# Patient Record
Sex: FEMALE | Race: WHITE | Employment: OTHER | URBAN - METROPOLITAN AREA
[De-identification: names, ages, dates, MRNs, and addresses within clinical notes are randomized per-mention and may not be internally consistent; named-entity substitution may affect disease eponyms.]

---

## 2017-06-22 ENCOUNTER — APPOINTMENT (OUTPATIENT)
Dept: CT IMAGING | Age: 72
End: 2017-06-22
Attending: EMERGENCY MEDICINE
Payer: MEDICARE

## 2017-06-22 ENCOUNTER — HOSPITAL ENCOUNTER (EMERGENCY)
Age: 72
Discharge: HOME | End: 2017-06-22
Attending: EMERGENCY MEDICINE
Payer: MEDICARE

## 2017-06-22 VITALS
HEIGHT: 66 IN | HEART RATE: 65 BPM | WEIGHT: 238 LBS | BODY MASS INDEX: 38.25 KG/M2 | TEMPERATURE: 98.1 F | DIASTOLIC BLOOD PRESSURE: 77 MMHG | RESPIRATION RATE: 18 BRPM | SYSTOLIC BLOOD PRESSURE: 139 MMHG | OXYGEN SATURATION: 97 %

## 2017-06-22 DIAGNOSIS — K57.92 DIVERTICULITIS OF INTESTINE WITHOUT PERFORATION OR ABSCESS WITHOUT BLEEDING, UNSPECIFIED PART OF INTESTINAL TRACT: ICD-10-CM

## 2017-06-22 DIAGNOSIS — R19.7 DIARRHEA, UNSPECIFIED TYPE: ICD-10-CM

## 2017-06-22 DIAGNOSIS — R10.13 ABDOMINAL PAIN, EPIGASTRIC: Primary | ICD-10-CM

## 2017-06-22 LAB
ALBUMIN SERPL BCP-MCNC: 3.5 G/DL (ref 3.2–4.6)
ALBUMIN/GLOB SERPL: 0.8 {RATIO} (ref 1.2–3.5)
ALP SERPL-CCNC: 57 U/L (ref 50–136)
ALT SERPL-CCNC: 19 U/L (ref 12–65)
ANION GAP BLD CALC-SCNC: 12 MMOL/L (ref 7–16)
AST SERPL W P-5'-P-CCNC: 20 U/L (ref 15–37)
BASOPHILS # BLD AUTO: 0 K/UL (ref 0–0.2)
BASOPHILS # BLD: 0 % (ref 0–2)
BILIRUB SERPL-MCNC: 0.3 MG/DL (ref 0.2–1.1)
BUN SERPL-MCNC: 11 MG/DL (ref 8–23)
CALCIUM SERPL-MCNC: 9.6 MG/DL (ref 8.3–10.4)
CHLORIDE SERPL-SCNC: 105 MMOL/L (ref 98–107)
CO2 SERPL-SCNC: 25 MMOL/L (ref 21–32)
CREAT SERPL-MCNC: 0.7 MG/DL (ref 0.6–1)
DIFFERENTIAL METHOD BLD: ABNORMAL
EOSINOPHIL # BLD: 0.3 K/UL (ref 0–0.8)
EOSINOPHIL NFR BLD: 2 % (ref 0.5–7.8)
ERYTHROCYTE [DISTWIDTH] IN BLOOD BY AUTOMATED COUNT: 16.5 % (ref 11.9–14.6)
GLOBULIN SER CALC-MCNC: 4.5 G/DL (ref 2.3–3.5)
GLUCOSE SERPL-MCNC: 110 MG/DL (ref 65–100)
HCT VFR BLD AUTO: 40.2 % (ref 35.8–46.3)
HGB BLD-MCNC: 13 G/DL (ref 11.7–15.4)
IMM GRANULOCYTES # BLD: 0 K/UL (ref 0–0.5)
IMM GRANULOCYTES NFR BLD AUTO: 0.3 % (ref 0–5)
LIPASE SERPL-CCNC: 189 U/L (ref 73–393)
LYMPHOCYTES # BLD AUTO: 25 % (ref 13–44)
LYMPHOCYTES # BLD: 2.7 K/UL (ref 0.5–4.6)
MCH RBC QN AUTO: 26.1 PG (ref 26.1–32.9)
MCHC RBC AUTO-ENTMCNC: 32.3 G/DL (ref 31.4–35)
MCV RBC AUTO: 80.7 FL (ref 79.6–97.8)
MONOCYTES # BLD: 0.9 K/UL (ref 0.1–1.3)
MONOCYTES NFR BLD AUTO: 8 % (ref 4–12)
NEUTS SEG # BLD: 7.2 K/UL (ref 1.7–8.2)
NEUTS SEG NFR BLD AUTO: 65 % (ref 43–78)
PLATELET # BLD AUTO: 450 K/UL (ref 150–450)
PMV BLD AUTO: 9.5 FL (ref 10.8–14.1)
POTASSIUM SERPL-SCNC: 3.9 MMOL/L (ref 3.5–5.1)
PROT SERPL-MCNC: 8 G/DL (ref 6.3–8.2)
RBC # BLD AUTO: 4.98 M/UL (ref 4.05–5.25)
SODIUM SERPL-SCNC: 142 MMOL/L (ref 136–145)
TROPONIN I SERPL-MCNC: <0.02 NG/ML (ref 0.02–0.05)
WBC # BLD AUTO: 11.1 K/UL (ref 4.3–11.1)

## 2017-06-22 PROCEDURE — 83690 ASSAY OF LIPASE: CPT | Performed by: EMERGENCY MEDICINE

## 2017-06-22 PROCEDURE — 81003 URINALYSIS AUTO W/O SCOPE: CPT | Performed by: EMERGENCY MEDICINE

## 2017-06-22 PROCEDURE — 74011636320 HC RX REV CODE- 636/320: Performed by: EMERGENCY MEDICINE

## 2017-06-22 PROCEDURE — 74177 CT ABD & PELVIS W/CONTRAST: CPT

## 2017-06-22 PROCEDURE — 80053 COMPREHEN METABOLIC PANEL: CPT | Performed by: EMERGENCY MEDICINE

## 2017-06-22 PROCEDURE — 93005 ELECTROCARDIOGRAM TRACING: CPT | Performed by: EMERGENCY MEDICINE

## 2017-06-22 PROCEDURE — 84484 ASSAY OF TROPONIN QUANT: CPT | Performed by: EMERGENCY MEDICINE

## 2017-06-22 PROCEDURE — 99285 EMERGENCY DEPT VISIT HI MDM: CPT | Performed by: EMERGENCY MEDICINE

## 2017-06-22 PROCEDURE — 74011000258 HC RX REV CODE- 258: Performed by: EMERGENCY MEDICINE

## 2017-06-22 PROCEDURE — 85025 COMPLETE CBC W/AUTO DIFF WBC: CPT | Performed by: EMERGENCY MEDICINE

## 2017-06-22 RX ORDER — CIPROFLOXACIN 500 MG/1
500 TABLET ORAL 2 TIMES DAILY
Qty: 20 TAB | Refills: 0 | Status: SHIPPED | OUTPATIENT
Start: 2017-06-22 | End: 2017-07-02

## 2017-06-22 RX ORDER — SUCRALFATE 1 G/10ML
1 SUSPENSION ORAL 4 TIMES DAILY
Qty: 420 ML | Refills: 0 | Status: ON HOLD | OUTPATIENT
Start: 2017-06-22 | End: 2017-07-15

## 2017-06-22 RX ORDER — METRONIDAZOLE 500 MG/1
500 TABLET ORAL 3 TIMES DAILY
Qty: 30 TAB | Refills: 0 | Status: SHIPPED | OUTPATIENT
Start: 2017-06-22 | End: 2017-07-02

## 2017-06-22 RX ORDER — SODIUM CHLORIDE 0.9 % (FLUSH) 0.9 %
10 SYRINGE (ML) INJECTION
Status: COMPLETED | OUTPATIENT
Start: 2017-06-22 | End: 2017-06-22

## 2017-06-22 RX ADMIN — IOPAMIDOL 100 ML: 755 INJECTION, SOLUTION INTRAVENOUS at 17:36

## 2017-06-22 RX ADMIN — Medication 10 ML: at 17:37

## 2017-06-22 RX ADMIN — SODIUM CHLORIDE 100 ML: 900 INJECTION, SOLUTION INTRAVENOUS at 17:37

## 2017-06-22 NOTE — ED TRIAGE NOTES
Pt states she saw her md this morning and was told she may have an infection in her colon. Pt states pain in her stomach for the past two months. Pt states nausea, vomiting and diarrhea.

## 2017-06-22 NOTE — ED NOTES
I have reviewed discharge instructions with the patient. The patient verbalized understanding. 3 Prescriptions given to patient. Patient ambulatory using walker upon discharge.

## 2017-06-22 NOTE — ED PROVIDER NOTES
HPI Comments: 66-year-old female presents with burning epigastric pain and foul smelling formed diarrhea immediately after eating for the past 6 months. She reports at least a 70 pound weight loss in the past 8 months. She has been seen by her primary care physician today and was told that she might have C. Difficile. She was told to come to the emergency department as testing could be done \"much faster. \" she has nausea after eating, but no vomiting. No blood in stools. No fever or travel. No contacts with similar symptoms. No recent hospital admission or antibiotics. Patient is a 67 y.o. female presenting with abdominal pain. The history is provided by the patient. Abdominal Pain    Associated symptoms include diarrhea and nausea. Pertinent negatives include no fever, no vomiting, no dysuria, no headaches, no chest pain and no back pain. Past Medical History:   Diagnosis Date    Diabetes ([address]     Hypertension     Pulmonary nodule     Completed surveillance in 2011       Past Surgical History:   Procedure Laterality Date    HX CARPAL TUNNEL RELEASE  1988    HX KNEE ARTHROSCOPY  2008    Left knee partial replacement    HX SHOULDER ARTHROSCOPY  1989    Rotator cuff         History reviewed. No pertinent family history. Social History     Social History    Marital status:      Spouse name: N/A    Number of children: N/A    Years of education: N/A     Occupational History    Not on file. Social History Main Topics    Smoking status: Never Smoker    Smokeless tobacco: Never Used    Alcohol use No    Drug use: No    Sexual activity: Not on file     Other Topics Concern    Not on file     Social History Narrative         ALLERGIES: Review of patient's allergies indicates no known allergies. Review of Systems   Constitutional: Positive for unexpected weight change. Negative for chills and fever. HENT: Negative for hearing loss. Eyes: Negative for visual disturbance. Respiratory: Negative for cough and shortness of breath. Cardiovascular: Negative for chest pain and palpitations. Gastrointestinal: Positive for abdominal pain, diarrhea and nausea. Negative for blood in stool and vomiting. Genitourinary: Negative for dysuria. Musculoskeletal: Negative for back pain. Skin: Negative for rash. Neurological: Negative for weakness and headaches. Psychiatric/Behavioral: Negative for confusion. Vitals:    06/22/17 1549   BP: 158/61   Pulse: 60   Resp: 18   Temp: 97.2 °F (36.2 °C)   SpO2: 93%   Weight: 108 kg (238 lb)   Height: 5' 6\" (1.676 m)            Physical Exam   Constitutional: She appears well-developed and well-nourished. HENT:   Head: Normocephalic and atraumatic. Right Ear: External ear normal.   Left Ear: External ear normal.   Nose: Nose normal.   Mouth/Throat: Oropharynx is clear and moist.   Eyes: Conjunctivae are normal. Pupils are equal, round, and reactive to light. Neck: Normal range of motion. Neck supple. Cardiovascular: Regular rhythm, normal heart sounds and intact distal pulses. Pulmonary/Chest: Effort normal and breath sounds normal. No respiratory distress. She has no wheezes. Abdominal: Soft. Bowel sounds are normal. She exhibits no distension. There is tenderness in the epigastric area. There is no rigidity, no rebound and no guarding. Musculoskeletal: Normal range of motion. She exhibits no edema. Neurological: She is alert. Skin: Skin is warm and dry. Psychiatric: Judgment normal.   Nursing note and vitals reviewed. MDM  Number of Diagnoses or Management Options  Diagnosis management comments: Parts of this document were created using dragon voice recognition software. The chart has been reviewed but errors may still be present.          Amount and/or Complexity of Data Reviewed  Clinical lab tests: ordered and reviewed (Results for orders placed or performed during the hospital encounter of 06/22/17  -TROPONIN I       Result                                            Value                         Ref Range                       Troponin-I, Qt.                                   <0.02 (L)                     0.02 - 0.05 NG/ML          -CBC WITH AUTOMATED DIFF       Result                                            Value                         Ref Range                       WBC                                               11.1                          4.3 - 11.1 K/uL                 RBC                                               4.98                          4.05 - 5.25 M/uL                HGB                                               13.0                          11.7 - 15.4 g/dL                HCT                                               40.2                          35.8 - 46.3 %                   MCV                                               80.7                          79.6 - 97.8 FL                  MCH                                               26.1                          26.1 - 32.9 PG                  MCHC                                              32.3                          31.4 - 35.0 g/dL                RDW                                               16.5 (H)                      11.9 - 14.6 %                   PLATELET                                          450                           150 - 450 K/uL                  MPV                                               9.5 (L)                       10.8 - 14.1 FL                  DF                                                AUTOMATED                                                     NEUTROPHILS                                       65                            43 - 78 %                       LYMPHOCYTES                                       25                            13 - 44 %                       MONOCYTES                                         8                             4.0 - 12.0 % EOSINOPHILS                                       2                             0.5 - 7.8 %                     BASOPHILS                                         0                             0.0 - 2.0 %                     IMMATURE GRANULOCYTES                             0.3                           0.0 - 5.0 %                     ABS. NEUTROPHILS                                  7.2                           1.7 - 8.2 K/UL                  ABS. LYMPHOCYTES                                  2.7                           0.5 - 4.6 K/UL                  ABS. MONOCYTES                                    0.9                           0.1 - 1.3 K/UL                  ABS. EOSINOPHILS                                  0.3                           0.0 - 0.8 K/UL                  ABS. BASOPHILS                                    0.0                           0.0 - 0.2 K/UL                  ABS. IMM.  GRANS.                                  0.0                           0.0 - 0.5 K/UL             -METABOLIC PANEL, COMPREHENSIVE       Result                                            Value                         Ref Range                       Sodium                                            142                           136 - 145 mmol/L                Potassium                                         3.9                           3.5 - 5.1 mmol/L                Chloride                                          105                           98 - 107 mmol/L                 CO2                                               25                            21 - 32 mmol/L                  Anion gap                                         12                            7 - 16 mmol/L                   Glucose                                           110 (H)                       65 - 100 mg/dL                  BUN                                               11                            8 - 23 MG/DL Creatinine                                        0.70                          0.6 - 1.0 MG/DL                 GFR est AA                                        >60                           >60 ml/min/1.73m2               GFR est non-AA                                    >60                           >60 ml/min/1.73m2               Calcium                                           9.6                           8.3 - 10.4 MG/DL                Bilirubin, total                                  0.3                           0.2 - 1.1 MG/DL                 ALT (SGPT)                                        19                            12 - 65 U/L                     AST (SGOT)                                        20                            15 - 37 U/L                     Alk. phosphatase                                  57                            50 - 136 U/L                    Protein, total                                    8.0                           6.3 - 8.2 g/dL                  Albumin                                           3.5                           3.2 - 4.6 g/dL                  Globulin                                          4.5 (H)                       2.3 - 3.5 g/dL                  A-G Ratio                                         0.8 (L)                       1.2 - 3.5                  -LIPASE       Result                                            Value                         Ref Range                       Lipase                                            189                           73 - 393 U/L               -EKG, 12 LEAD, INITIAL       Result                                            Value                         Ref Range                       Ventricular Rate                                  62                            BPM                             Atrial Rate                                       62                            BPM                             P-R Interval 166                           ms                              QRS Duration                                      132                           ms                              Q-T Interval                                      454                           ms                              QTC Calculation (Bezet)                           460                           ms                              Calculated P Axis                                 13                            degrees                         Calculated R Axis                                 31                            degrees                         Calculated T Axis                                 11                            degrees                         Diagnosis                                                                                                   Normal sinus rhythm   Right bundle branch block   Abnormal ECG   When compared with ECG of 11-FEB-2008 16:46,   No significant change was found     )  Tests in the radiology section of CPT®: ordered and reviewed (Ct Abd Pelv W Cont    Result Date: 6/22/2017  CT of the Abdomen and Pelvis with contrast INDICATION:  Severe mid to upper abdominal pain COMPARISON: None TECHNIQUE: Multiple axial images were obtained through the abdomen and pelvis after intravenous injection of 100 mL Isovue 370. No oral contrast used. Coronal reformatted images were provided. Contrast necessary to increase sensitivity for neoplasia and infection. Radiation dose reduction techniques were used for this study:  Our CT scanners use one or all of the following: Automated exposure control, adjustment of the mA and/or kVp according to patient's size, iterative reconstruction. FINDINGS: Abdomen CT:  Atherosclerotic aortic heart valve. Mild cardiomegaly.  The liver, spleen, gallbladder, adrenal glands, kidneys, and pancreas are normal. Abdominal aorta is atherosclerotic but normal in caliber. The appendix is normal. CT PELVIS: Sigmoid colonic diverticulosis. There are mild proximal sigmoid inflammatory changes consistent with acute diverticulitis. Tiny amount of gas in the urinary bladder. Couple calcifications are associated with the uterus probably related to fibroids. No acute or aggressive bone abnormality. IMPRESSION: Mild proximal sigmoid acute diverticulitis. 2. Tiny focus of urinary bladder luminal gas. Correlate for recent instrumentation. Fistula or airforming infection can also cause this.     )    Risk of Complications, Morbidity, and/or Mortality  General comments: 6:45 PM  Patient has not wanted anything for pain. She has no fever or leukocytosis. Diarrhea and epigastric pain has been intermittent for the past 6 months with weight loss. Inflammation around sigmoid colon is very minimal.  CT reviewed with Dr. Tonia Bolden, surgeon. He was not concerned about perforation, but possibly more likely chronic fistula. Patient has no left lower quadrant or suprapubic tenderness. Will discuss with GI to assure close follow-up for endoscopy for further eval epigastric pain and weight loss. The patient expressed understanding. Given strict return precautions. She is already taking PPI. I discussed the results of all labs, procedures, radiographs, and treatments with the patient and available family. Treatment plan is agreed upon and the patient is ready for discharge. Questions about treatment in the ED and differential diagnosis of presenting condition were answered. Patient was given verbal discharge instructions including, but not limited to, importance of returning to the emergency department for any concern of worsening or continued symptoms. Instructions were given to follow up with a primary care provider or specialist within 1-2 days.   Adverse effects of medications, if prescribed, were discussed and patient was advised to refrain from significant physical activity until followed up by primary care physician and to not drive or operate heavy machinery after taking any sedating substances.           ED Course       Procedures

## 2017-06-22 NOTE — ED NOTES
Pt to er c/o having n/v/d for 3-6 months. .. sts her family doctor sent her to the er to get some \"tests\" done

## 2017-06-22 NOTE — DISCHARGE INSTRUCTIONS
Diverticulitis: Care Instructions  Your Care Instructions    Diverticulitis occurs when pouches form in the wall of the colon and become inflamed or infected. It can be very painful. Doctors aren't sure what causes diverticulitis. There is no proof that foods such as nuts, seeds, or berries cause it or make it worse. A low-fiber diet may cause the colon to work harder to push stool forward. Pouches may form because of this extra work. It may be hard to think about healthy eating while you're in pain. But as you recover, you might think about how you can use healthy eating for overall better health. Healthy eating may help you avoid future attacks. Follow-up care is a key part of your treatment and safety. Be sure to make and go to all appointments, and call your doctor if you are having problems. It's also a good idea to know your test results and keep a list of the medicines you take. How can you care for yourself at home? · Drink plenty of fluids, enough so that your urine is light yellow or clear like water. If you have kidney, heart, or liver disease and have to limit fluids, talk with your doctor before you increase the amount of fluids you drink. · Stick to liquids or a bland diet (plain rice, bananas, dry toast or crackers, applesauce) until you are feeling better. Then you can return to regular foods and gradually increase the amount of fiber in your diet. · Use a heating pad set on low on your belly to relieve mild cramps and pain. · Get extra rest until you are feeling better. · Be safe with medicines. Read and follow all instructions on the label. ¨ If the doctor gave you a prescription medicine for pain, take it as prescribed. ¨ If you are not taking a prescription pain medicine, ask your doctor if you can take an over-the-counter medicine. · If your doctor prescribed antibiotics, take them as directed. Do not stop taking them just because you feel better.  You need to take the full course of antibiotics. To prevent future attacks of diverticulitis  · Avoid constipation:  ¨ Include fruits, vegetables, beans, and whole grains in your diet each day. These foods are high in fiber. ¨ Drink plenty of fluids, enough so that your urine is light yellow or clear like water. If you have kidney, heart, or liver disease and have to limit fluids, talk with your doctor before you increase the amount of fluids you drink. ¨ Get some exercise every day. Build up slowly to 30 to 60 minutes a day on 5 or more days of the week. ¨ Take a fiber supplement, such as Citrucel or Metamucil, every day if needed. Read and follow all instructions on the label. ¨ Schedule time each day for a bowel movement. Having a daily routine may help. Take your time and do not strain when having a bowel movement. When should you call for help? Call 911 anytime you think you may need emergency care. For example, call if:  · You passed out (lost consciousness). · You vomit blood or what looks like coffee grounds. · You pass maroon or very bloody stools. Call your doctor now or seek immediate medical care if:  · You have severe pain or swelling in your belly. · You have a new or higher fever. · You cannot keep down fluids or medicines. · You have new pain that gets worse when you move or cough. Watch closely for changes in your health, and be sure to contact your doctor if:  · The symptoms you had when you first started feeling sick come back. · You do not get better as expected. Where can you learn more? Go to http://philip-gloria.info/. Enter H901 in the search box to learn more about \"Diverticulitis: Care Instructions. \"  Current as of: August 9, 2016  Content Version: 11.3  © 6392-4070 [employer]. Care instructions adapted under license by [employer] (which disclaims liability or warranty for this information).  If you have questions about a medical condition or this instruction, always ask your healthcare professional. [place] [address] any warranty or liability for your use of this information.

## 2017-06-23 LAB
ATRIAL RATE: 62 BPM
CALCULATED P AXIS, ECG09: 13 DEGREES
CALCULATED R AXIS, ECG10: 31 DEGREES
CALCULATED T AXIS, ECG11: 11 DEGREES
DIAGNOSIS, 93000: NORMAL
P-R INTERVAL, ECG05: 166 MS
Q-T INTERVAL, ECG07: 454 MS
QRS DURATION, ECG06: 132 MS
QTC CALCULATION (BEZET), ECG08: 460 MS
VENTRICULAR RATE, ECG03: 62 BPM

## 2017-07-13 ENCOUNTER — HOSPITAL ENCOUNTER (OUTPATIENT)
Age: 72
Setting detail: OBSERVATION
Discharge: HOME | End: 2017-07-15
Attending: EMERGENCY MEDICINE | Admitting: HOSPITALIST
Payer: MEDICARE

## 2017-07-13 ENCOUNTER — APPOINTMENT (OUTPATIENT)
Dept: CT IMAGING | Age: 72
End: 2017-07-13
Attending: EMERGENCY MEDICINE
Payer: MEDICARE

## 2017-07-13 DIAGNOSIS — R10.13 ABDOMINAL PAIN, EPIGASTRIC: Primary | ICD-10-CM

## 2017-07-13 PROBLEM — R10.9 ABDOMINAL PAIN: Status: ACTIVE | Noted: 2017-07-13

## 2017-07-13 PROBLEM — D64.9 ANEMIA: Status: ACTIVE | Noted: 2017-07-13

## 2017-07-13 LAB
ALBUMIN SERPL BCP-MCNC: 2.9 G/DL (ref 3.2–4.6)
ALBUMIN/GLOB SERPL: 0.7 {RATIO} (ref 1.2–3.5)
ALP SERPL-CCNC: 46 U/L (ref 50–136)
ALT SERPL-CCNC: 12 U/L (ref 12–65)
ANION GAP BLD CALC-SCNC: 11 MMOL/L (ref 7–16)
AST SERPL W P-5'-P-CCNC: 15 U/L (ref 15–37)
ATRIAL RATE: 61 BPM
BASOPHILS # BLD AUTO: 0 K/UL (ref 0–0.2)
BASOPHILS # BLD: 0 % (ref 0–2)
BILIRUB SERPL-MCNC: 0.4 MG/DL (ref 0.2–1.1)
BUN SERPL-MCNC: 8 MG/DL (ref 8–23)
CALCIUM SERPL-MCNC: 9.4 MG/DL (ref 8.3–10.4)
CALCULATED P AXIS, ECG09: 25 DEGREES
CALCULATED R AXIS, ECG10: 33 DEGREES
CALCULATED T AXIS, ECG11: -23 DEGREES
CHLORIDE SERPL-SCNC: 107 MMOL/L (ref 98–107)
CO2 SERPL-SCNC: 26 MMOL/L (ref 21–32)
CREAT SERPL-MCNC: 0.41 MG/DL (ref 0.6–1)
DIAGNOSIS, 93000: NORMAL
DIFFERENTIAL METHOD BLD: ABNORMAL
EOSINOPHIL # BLD: 0.2 K/UL (ref 0–0.8)
EOSINOPHIL NFR BLD: 2 % (ref 0.5–7.8)
ERYTHROCYTE [DISTWIDTH] IN BLOOD BY AUTOMATED COUNT: 16.6 % (ref 11.9–14.6)
GLOBULIN SER CALC-MCNC: 4 G/DL (ref 2.3–3.5)
GLUCOSE SERPL-MCNC: 102 MG/DL (ref 65–100)
HCT VFR BLD AUTO: 35.1 % (ref 35.8–46.3)
HGB BLD-MCNC: 11.4 G/DL (ref 11.7–15.4)
IMM GRANULOCYTES # BLD: 0.1 K/UL (ref 0–0.5)
IMM GRANULOCYTES NFR BLD AUTO: 0.7 % (ref 0–5)
LIPASE SERPL-CCNC: 113 U/L (ref 73–393)
LYMPHOCYTES # BLD AUTO: 11 % (ref 13–44)
LYMPHOCYTES # BLD: 1.3 K/UL (ref 0.5–4.6)
MCH RBC QN AUTO: 26 PG (ref 26.1–32.9)
MCHC RBC AUTO-ENTMCNC: 32.5 G/DL (ref 31.4–35)
MCV RBC AUTO: 80.1 FL (ref 79.6–97.8)
MONOCYTES # BLD: 0.6 K/UL (ref 0.1–1.3)
MONOCYTES NFR BLD AUTO: 5 % (ref 4–12)
NEUTS SEG # BLD: 9.8 K/UL (ref 1.7–8.2)
NEUTS SEG NFR BLD AUTO: 81 % (ref 43–78)
P-R INTERVAL, ECG05: 166 MS
PLATELET # BLD AUTO: 374 K/UL (ref 150–450)
PMV BLD AUTO: 9.1 FL (ref 10.8–14.1)
POTASSIUM SERPL-SCNC: 3.8 MMOL/L (ref 3.5–5.1)
PROT SERPL-MCNC: 6.9 G/DL (ref 6.3–8.2)
Q-T INTERVAL, ECG07: 478 MS
QRS DURATION, ECG06: 136 MS
QTC CALCULATION (BEZET), ECG08: 481 MS
RBC # BLD AUTO: 4.38 M/UL (ref 4.05–5.25)
SODIUM SERPL-SCNC: 144 MMOL/L (ref 136–145)
TROPONIN I SERPL-MCNC: <0.02 NG/ML (ref 0.02–0.05)
VENTRICULAR RATE, ECG03: 61 BPM
WBC # BLD AUTO: 12 K/UL (ref 4.3–11.1)

## 2017-07-13 PROCEDURE — 84484 ASSAY OF TROPONIN QUANT: CPT | Performed by: EMERGENCY MEDICINE

## 2017-07-13 PROCEDURE — 96374 THER/PROPH/DIAG INJ IV PUSH: CPT | Performed by: EMERGENCY MEDICINE

## 2017-07-13 PROCEDURE — 74177 CT ABD & PELVIS W/CONTRAST: CPT

## 2017-07-13 PROCEDURE — 96375 TX/PRO/DX INJ NEW DRUG ADDON: CPT | Performed by: EMERGENCY MEDICINE

## 2017-07-13 PROCEDURE — 99285 EMERGENCY DEPT VISIT HI MDM: CPT | Performed by: EMERGENCY MEDICINE

## 2017-07-13 PROCEDURE — 96361 HYDRATE IV INFUSION ADD-ON: CPT | Performed by: EMERGENCY MEDICINE

## 2017-07-13 PROCEDURE — 74011250636 HC RX REV CODE- 250/636: Performed by: HOSPITALIST

## 2017-07-13 PROCEDURE — 93005 ELECTROCARDIOGRAM TRACING: CPT | Performed by: EMERGENCY MEDICINE

## 2017-07-13 PROCEDURE — 74011000250 HC RX REV CODE- 250: Performed by: HOSPITALIST

## 2017-07-13 PROCEDURE — 83690 ASSAY OF LIPASE: CPT | Performed by: EMERGENCY MEDICINE

## 2017-07-13 PROCEDURE — 80053 COMPREHEN METABOLIC PANEL: CPT | Performed by: EMERGENCY MEDICINE

## 2017-07-13 PROCEDURE — C9113 INJ PANTOPRAZOLE SODIUM, VIA: HCPCS | Performed by: HOSPITALIST

## 2017-07-13 PROCEDURE — 81003 URINALYSIS AUTO W/O SCOPE: CPT | Performed by: EMERGENCY MEDICINE

## 2017-07-13 PROCEDURE — 96376 TX/PRO/DX INJ SAME DRUG ADON: CPT | Performed by: EMERGENCY MEDICINE

## 2017-07-13 PROCEDURE — 99218 HC RM OBSERVATION: CPT

## 2017-07-13 PROCEDURE — 74011000258 HC RX REV CODE- 258: Performed by: EMERGENCY MEDICINE

## 2017-07-13 PROCEDURE — 85025 COMPLETE CBC W/AUTO DIFF WBC: CPT | Performed by: EMERGENCY MEDICINE

## 2017-07-13 PROCEDURE — 74011636320 HC RX REV CODE- 636/320: Performed by: EMERGENCY MEDICINE

## 2017-07-13 PROCEDURE — 74011250636 HC RX REV CODE- 250/636: Performed by: EMERGENCY MEDICINE

## 2017-07-13 RX ORDER — SODIUM CHLORIDE 9 MG/ML
500 INJECTION, SOLUTION INTRAVENOUS ONCE
Status: COMPLETED | OUTPATIENT
Start: 2017-07-13 | End: 2017-07-14

## 2017-07-13 RX ORDER — ONDANSETRON 2 MG/ML
4 INJECTION INTRAMUSCULAR; INTRAVENOUS
Status: COMPLETED | OUTPATIENT
Start: 2017-07-13 | End: 2017-07-13

## 2017-07-13 RX ORDER — MORPHINE SULFATE 4 MG/ML
4 INJECTION, SOLUTION INTRAMUSCULAR; INTRAVENOUS
Status: COMPLETED | OUTPATIENT
Start: 2017-07-13 | End: 2017-07-13

## 2017-07-13 RX ORDER — HYDRALAZINE HYDROCHLORIDE 20 MG/ML
10 INJECTION INTRAMUSCULAR; INTRAVENOUS
Status: DISCONTINUED | OUTPATIENT
Start: 2017-07-13 | End: 2017-07-15 | Stop reason: HOSPADM

## 2017-07-13 RX ORDER — SODIUM CHLORIDE 0.9 % (FLUSH) 0.9 %
10 SYRINGE (ML) INJECTION
Status: COMPLETED | OUTPATIENT
Start: 2017-07-13 | End: 2017-07-13

## 2017-07-13 RX ADMIN — SODIUM CHLORIDE 40 MG: 9 INJECTION INTRAMUSCULAR; INTRAVENOUS; SUBCUTANEOUS at 00:05

## 2017-07-13 RX ADMIN — MORPHINE SULFATE 4 MG: 4 INJECTION, SOLUTION INTRAMUSCULAR; INTRAVENOUS at 22:47

## 2017-07-13 RX ADMIN — SODIUM CHLORIDE 100 ML: 900 INJECTION, SOLUTION INTRAVENOUS at 21:31

## 2017-07-13 RX ADMIN — ONDANSETRON 4 MG: 2 INJECTION INTRAMUSCULAR; INTRAVENOUS at 20:03

## 2017-07-13 RX ADMIN — SODIUM CHLORIDE 500 ML: 900 INJECTION, SOLUTION INTRAVENOUS at 20:03

## 2017-07-13 RX ADMIN — Medication 10 ML: at 21:31

## 2017-07-13 RX ADMIN — MORPHINE SULFATE 4 MG: 4 INJECTION, SOLUTION INTRAMUSCULAR; INTRAVENOUS at 20:03

## 2017-07-13 RX ADMIN — DIATRIZOATE MEGLUMINE AND DIATRIZOATE SODIUM 15 ML: 600; 100 SOLUTION ORAL; RECTAL at 20:03

## 2017-07-13 RX ADMIN — IOPAMIDOL 100 ML: 755 INJECTION, SOLUTION INTRAVENOUS at 21:31

## 2017-07-13 NOTE — ED PROVIDER NOTES
Patient is a 67 y.o. female presenting with abdominal pain. The history is provided by the patient and a relative. Abdominal Pain    This is a recurrent problem. The current episode started more than 1 week ago. The problem occurs daily. The problem has been gradually worsening. The pain is located in the epigastric region. The quality of the pain is aching, cramping and sharp. The pain is at a severity of 7/10. The pain is moderate. Associated symptoms include nausea. Pertinent negatives include no fever, no vomiting, no dysuria, no frequency, no headaches, no arthralgias, no myalgias and no chest pain. Nothing worsens the pain. The pain is relieved by nothing. Past workup includes CT scan. The patient's surgical history non-contributory. Past Medical History:   Diagnosis Date    Diabetes ([facility] [address]     Hypertension     Pulmonary nodule     Completed surveillance in 2011       Past Surgical History:   Procedure Laterality Date    HX CARPAL TUNNEL RELEASE  1988    HX KNEE ARTHROSCOPY  2008    Left knee partial replacement    HX SHOULDER ARTHROSCOPY  1989    Rotator cuff         History reviewed. No pertinent family history. Social History     Social History    Marital status:      Spouse name: N/A    Number of children: N/A    Years of education: N/A     Occupational History    Not on file. Social History Main Topics    Smoking status: Never Smoker    Smokeless tobacco: Never Used    Alcohol use No    Drug use: No    Sexual activity: Not on file     Other Topics Concern    Not on file     Social History Narrative         ALLERGIES: Review of patient's allergies indicates no known allergies. Review of Systems   Constitutional: Negative. Negative for chills and fever. HENT: Negative. Negative for congestion, ear pain, postnasal drip and rhinorrhea. Eyes: Negative for pain and visual disturbance. Respiratory: Negative for cough and wheezing.     Cardiovascular: Negative for chest pain and leg swelling. Gastrointestinal: Positive for abdominal pain and nausea. Negative for abdominal distention and vomiting. Endocrine: Negative. Negative for polydipsia, polyphagia and polyuria. Genitourinary: Negative. Negative for difficulty urinating, dysuria, flank pain and frequency. Musculoskeletal: Negative. Negative for arthralgias and myalgias. Skin: Negative. Neurological: Negative. Negative for dizziness and headaches. Hematological: Negative. Vitals:    07/13/17 1730   BP: 124/59   Pulse: 66   Resp: 18   Temp: 97.8 °F (36.6 °C)   Weight: 108 kg (238 lb)   Height: 5' 6\" (1.676 m)            Physical Exam   Abdominal: She exhibits no distension. There is tenderness. There is no rebound. MDM  Number of Diagnoses or Management Options  Abdominal pain, epigastric: new and requires workup  Diagnosis management comments: Patient states that she was recently in the hospital for similar and diagnosed with sigmoid diverticulitis and treated with metabolic therapy and had some transient improvement only for her symptoms slowly return again. She states she contacted the gastroenterology office to see if she had earlier follow-up since she didn't feel she can make to the appointment but they did not return her call today. Patient ended up in the ER due to increasing discomfort primarily in the epigastric region but has no specific mitigating factors  and is dissimilar to her previous pain from last admission. Reviewed case with Dr. Benjamin Horvath agreed that patient may ultimately require EGD but needs to review her chart. Suggested admission to the hospitalist service if patient is unable to maintain oral intake, has diabetes and is having uncontrolled pain. Imaging failed to reveal etiology of symptoms but seems to be well localized to the upper abdomen and by history is worse with food and seems to be consistent with something similar to PUD versus gastritis. However, since patient is diabetic and unable to maintain a steady oral intake of fluids or food felt that IV hydration may be needed at least overnight until GI can evaluate this patient since it is about to be the weekend and unable to be seen until Monday.        Amount and/or Complexity of Data Reviewed  Clinical lab tests: ordered and reviewed (Results for orders placed or performed during the hospital encounter of 07/13/17  -CBC WITH AUTOMATED DIFF       Result                                            Value                         Ref Range                       WBC                                               12.0 (H)                      4.3 - 11.1 K/uL                 RBC                                               4.38                          4.05 - 5.25 M/uL                HGB                                               11.4 (L)                      11.7 - 15.4 g/dL                HCT                                               35.1 (L)                      35.8 - 46.3 %                   MCV                                               80.1                          79.6 - 97.8 FL                  MCH                                               26.0 (L)                      26.1 - 32.9 PG                  MCHC                                              32.5                          31.4 - 35.0 g/dL                RDW                                               16.6 (H)                      11.9 - 14.6 %                   PLATELET                                          374                           150 - 450 K/uL                  MPV                                               9.1 (L)                       10.8 - 14.1 FL                  DF                                                AUTOMATED                                                     NEUTROPHILS                                       81 (H)                        43 - 78 %                       LYMPHOCYTES 11 (L)                        13 - 44 %                       MONOCYTES                                         5                             4.0 - 12.0 %                    EOSINOPHILS                                       2                             0.5 - 7.8 %                     BASOPHILS                                         0                             0.0 - 2.0 %                     IMMATURE GRANULOCYTES                             0.7                           0.0 - 5.0 %                     ABS. NEUTROPHILS                                  9.8 (H)                       1.7 - 8.2 K/UL                  ABS. LYMPHOCYTES                                  1.3                           0.5 - 4.6 K/UL                  ABS. MONOCYTES                                    0.6                           0.1 - 1.3 K/UL                  ABS. EOSINOPHILS                                  0.2                           0.0 - 0.8 K/UL                  ABS. BASOPHILS                                    0.0                           0.0 - 0.2 K/UL                  ABS. IMM.  GRANS.                                  0.1                           0.0 - 0.5 K/UL             -METABOLIC PANEL, COMPREHENSIVE       Result                                            Value                         Ref Range                       Sodium                                            144                           136 - 145 mmol/L                Potassium                                         3.8                           3.5 - 5.1 mmol/L                Chloride                                          107                           98 - 107 mmol/L                 CO2                                               26                            21 - 32 mmol/L                  Anion gap                                         11                            7 - 16 mmol/L                   Glucose 102 (H)                       65 - 100 mg/dL                  BUN                                               8                             8 - 23 MG/DL                    Creatinine                                        0.41 (L)                      0.6 - 1.0 MG/DL                 GFR est AA                                        >60                           >60 ml/min/1.73m2               GFR est non-AA                                    >60                           >60 ml/min/1.73m2               Calcium                                           9.4                           8.3 - 10.4 MG/DL                Bilirubin, total                                  0.4                           0.2 - 1.1 MG/DL                 ALT (SGPT)                                        12                            12 - 65 U/L                     AST (SGOT)                                        15                            15 - 37 U/L                     Alk. phosphatase                                  46 (L)                        50 - 136 U/L                    Protein, total                                    6.9                           6.3 - 8.2 g/dL                  Albumin                                           2.9 (L)                       3.2 - 4.6 g/dL                  Globulin                                          4.0 (H)                       2.3 - 3.5 g/dL                  A-G Ratio                                         0.7 (L)                       1.2 - 3.5                  -LIPASE       Result                                            Value                         Ref Range                       Lipase                                            113                           73 - 393 U/L               -TROPONIN I       Result                                            Value                         Ref Range                       Troponin-I, Qt.                                   <0.02 (L) 0.02 - 0.05 NG/ML          )  Tests in the radiology section of CPT®: ordered and reviewed      ED Course       Procedures

## 2017-07-13 NOTE — IP AVS SNAPSHOT
[address] 
[phone] Patient: Enriqueta Rasmussen MRN: [id number] You are allergic to the following No active allergies Recent Documentation Height Weight BMI OB Status Smoking Status 1.676 m 108 kg 38.41 kg/m2 Postmenopausal Never Smoker Emergency Contacts Name Discharge Info Relation Home Work Mobile Leonid Snow DISCHARGE CAREGIVER [3] Spouse [3] [phone] About your hospitalization You were admitted on:  July 13, 2017 You last received care in the:  [facility] 6 MED SURG You were discharged on:  July 15, 2017 Unit phone number:  [phone] Why you were hospitalized Your primary diagnosis was:  Abdominal Pain Your diagnoses also included:  Obstructive Sleep Apnea, Obesity, Diabetes Mellitus (Hcc), Anemia Providers Seen During Your Hospitalizations Provider Role Specialty Primary office phone Isabel Valadez DO Attending Provider Emergency Medicine [phone] Oriana Aguila MD Attending Provider [facility] [place] [phone] Trae Judy Suarez MD Attending Provider Internal Medicine [phone] Your Primary Care Physician (PCP) Primary Care Physician Office Phone Office Fax 3 [address] [phone] Follow-up Information Follow up With Details Comments Contact Info Navdeep Ta MD   [address] 
[phone] Current Discharge Medication List  
  
START taking these medications Dose & Instructions Dispensing Information Comments Morning Noon Evening Bedtime  
 pantoprazole 40 mg tablet Commonly known as:  PROTONIX Your last dose was: Your next dose is:    
   
   
 Dose:  40 mg Take 1 Tab by mouth Before breakfast and dinner. Indications: Suspected small bowel ulcer Quantity:  60 Tab Refills:  0 CONTINUE these medications which have NOT CHANGED Dose & Instructions Dispensing Information Comments Morning Noon Evening Bedtime  
 cpap machine kit Your last dose was: Your next dose is:    
   
   
 by Does Not Apply route. 10 cm with O2 @@ 4 lpm  
 Refills:  0 INVOKANA 300 mg tablet Generic drug:  canagliflozin Your last dose was: Your next dose is:    
   
   
 Dose:  300 mg Take 300 mg by mouth daily. Refills:  0  
     
   
   
   
  
 levothyroxine 50 mcg tablet Commonly known as:  SYNTHROID Your last dose was: Your next dose is:    
   
   
 Dose:  50 mcg Take 50 mcg by mouth Daily (before breakfast). Refills:  0  
     
   
   
   
  
 losartan 100 mg tablet Commonly known as:  COZAAR Your last dose was: Your next dose is:    
   
   
 Dose:  100 mg Take 100 mg by mouth daily. Refills:  0  
     
   
   
   
  
 lovastatin 40 mg tablet Commonly known as:  MEVACOR Your last dose was: Your next dose is:    
   
   
 Dose:  40 mg Take 40 mg by mouth nightly. Refills:  0  
     
   
   
   
  
 metFORMIN 500 mg tablet Commonly known as:  GLUCOPHAGE Your last dose was: Your next dose is:    
   
   
 Dose:  1000 mg Take 1,000 mg by mouth two (2) times daily (with meals). Refills:  0  
     
   
   
   
  
 nadolol 80 mg tablet Commonly known as:  CORGARD Your last dose was: Your next dose is:    
   
   
 Dose:  80 mg Take 80 mg by mouth daily. Refills:  0  
     
   
   
   
  
 oxybutynin 5 mg tablet Commonly known as:  WGJPHNIP Your last dose was: Your next dose is:    
   
   
 Dose:  5 mg Take 5 mg by mouth two (2) times a day. Refills:  0  
     
   
   
   
  
 oxyCODONE IR 10 mg Tab immediate release tablet Commonly known as:  Gerald Hamilton  
   
 Your last dose was: Your next dose is:    
   
   
 Dose:  10 mg Take 10 mg by mouth four (4) times daily. Refills:  0 OXYGEN-AIR DELIVERY SYSTEMS Your last dose was: Your next dose is:    
   
   
 by Does Not Apply route. 4 lpm bled into cpap Refills:  0 PARoxetine 20 mg tablet Commonly known as:  PAXIL Your last dose was: Your next dose is:    
   
   
 Dose:  20 mg Take 20 mg by mouth daily. Refills:  0  
     
   
   
   
  
 pioglitazone 30 mg tablet Commonly known as:  ACTOS Your last dose was: Your next dose is: Take  by mouth daily. Refills:  0  
     
   
   
   
  
 sucralfate 100 mg/mL suspension Commonly known as:  Dmitri Esqueda Your last dose was: Your next dose is:    
   
   
 Dose:  1 g Take 10 mL by mouth four (4) times daily. Quantity:  414 mL Refills:  1  
     
   
   
   
  
 zolpidem 10 mg tablet Commonly known as:  AMBIEN Your last dose was: Your next dose is: Take  by mouth nightly as needed. Refills:  0 STOP taking these medications   
 omeprazole 20 mg capsule Commonly known as:  PRILOSEC Where to Get Your Medications Information on where to get these meds will be given to you by the nurse or doctor. ! Ask your nurse or doctor about these medications  
  pantoprazole 40 mg tablet  
 sucralfate 100 mg/mL suspension Discharge Instructions Stop taking prilosec (omeprazole). Start taking Protonix (pantoprazole) 40 mg twice a day for a month or until you see gastroenterologist.  Continue sucralafate. Do not take any more NSAIDS (advil, ibuprofen, aleve, goody's, etc). Only take your oxycodone and tylenol for pain. DISCHARGE SUMMARY from Nurse The following personal items are in your possession at time of discharge: Dental Appliances: None Visual Aid: Glasses, With patient Home Medications: None Jewelry: None Clothing: At bedside Other Valuables: Cell Phone PATIENT INSTRUCTIONS: 
 
After general anesthesia or intravenous sedation, for 24 hours or while taking prescription Narcotics: · Limit your activities · Do not drive and operate hazardous machinery · Do not make important personal or business decisions · Do  not drink alcoholic beverages · If you have not urinated within 8 hours after discharge, please contact your surgeon on call. Report the following to your surgeon: 
· Excessive pain, swelling, redness or odor of or around the surgical area · Temperature over 100.5 · Nausea and vomiting lasting longer than 4 hours or if unable to take medications · Any signs of decreased circulation or nerve impairment to extremity: change in color, persistent  numbness, tingling, coldness or increase pain · Any questions What to do at Home: 
Recommended activity: Activity as tolerated and No lifting, Driving, or Strenuous exercise for today If you experience any of the following symptoms excessive nausea and vomiting, fever greater than 100.5, any bleeding, please follow up with primary health care provider and if emergency call 911 and report to emergency room. *  Please give a list of your current medications to your Primary Care Provider. *  Please update this list whenever your medications are discontinued, doses are 
    changed, or new medications (including over-the-counter products) are added. *  Please carry medication information at all times in case of emergency situations. These are general instructions for a healthy lifestyle: No smoking/ No tobacco products/ Avoid exposure to second hand smoke Surgeon General's Warning:  Quitting smoking now greatly reduces serious risk to your health. Obesity, smoking, and sedentary lifestyle greatly increases your risk for illness A healthy diet, regular physical exercise & weight monitoring are important for maintaining a healthy lifestyle You may be retaining fluid if you have a history of heart failure or if you experience any of the following symptoms:  Weight gain of 3 pounds or more overnight or 5 pounds in a week, increased swelling in our hands or feet or shortness of breath while lying flat in bed. Please call your doctor as soon as you notice any of these symptoms; do not wait until your next office visit. Recognize signs and symptoms of STROKE: 
 
F-face looks uneven A-arms unable to move or move unevenly S-speech slurred or non-existent T-time-call 911 as soon as signs and symptoms begin-DO NOT go Back to bed or wait to see if you get better-TIME IS BRAIN. Warning Signs of HEART ATTACK Call 911 if you have these symptoms: 
? Chest discomfort. Most heart attacks involve discomfort in the center of the chest that lasts more than a few minutes, or that goes away and comes back. It can feel like uncomfortable pressure, squeezing, fullness, or pain. ? Discomfort in other areas of the upper body. Symptoms can include pain or discomfort in one or both arms, the back, neck, jaw, or stomach. ? Shortness of breath with or without chest discomfort. ? Other signs may include breaking out in a cold sweat, nausea, or lightheadedness. Don't wait more than five minutes to call [address]! Fast action can save your life. Calling 911 is almost always the fastest way to get lifesaving treatment. Emergency Medical Services staff can begin treatment when they arrive  up to an hour sooner than if someone gets to the hospital by car. The discharge information has been reviewed with the patient. The patient verbalized understanding. Discharge medications reviewed with the patient Upper GI Endoscopy: What to Expect at [facility] Your Recovery After you have an endoscopy, you will stay at the hospital or clinic for 1 to 2 hours. This will allow the medicine to wear off. You will be able to go home after your doctor or nurse checks to make sure you are not having any problems. You may have to stay overnight if you had treatment during the test. You may have a sore throat for a day or two after the test. 
This care sheet gives you a general idea about what to expect after the test. 
How can you care for yourself at home? Activity Rest as much as you need to after you go home. You should be able to go back to your usual activities the day after the test. 
Diet Follow your doctor's directions for eating after the test. 
Drink plenty of fluids (unless your doctor has told you not to). Medications If you have a sore throat the day after the test, use an over-the-counter spray to numb your throat. Follow-up care is a key part of your treatment and safety. Be sure to make and go to all appointments, and call your doctor if you are having problems. It's also a good idea to know your test results and keep a list of the medicines you take. When should you call for help? Call 911 anytime you think you may need emergency care. For example, call if: You passed out (lost consciousness). You cough up blood. You vomit blood or what looks like coffee grounds. You pass maroon or very bloody stools. Call your doctor now or seek immediate medical care if: 
You have trouble swallowing. You have belly pain. Your stools are black and tarlike or have streaks of blood. You are sick to your stomach or cannot keep fluids down. Watch closely for changes in your health, and be sure to contact your doctor if: 
Your throat still hurts after a day or two. You do not get better as expected. Where can you learn more? Go to http://philip-gloria.info/. Enter [phone] 189-249 in the search box to learn more about \"Upper GI Endoscopy: What to Expect at Home. \" Current as of: August 9, 2016 Content Version: 11.3 © 3820-1265 [employer]. Care instructions adapted under license by [employer] (which disclaims liability or warranty for this information). If you have questions about a medical condition or this instruction, always ask your healthcare professional. [place] [address] any warranty or liability for your use of this information. Nausea and Vomiting: Care Instructions Your Care Instructions When you are nauseated, you may feel weak and sweaty and notice a lot of saliva in your mouth. Nausea often leads to vomiting. Most of the time you do not need to worry about nausea and vomiting, but they can be signs of other illnesses. Two common causes of nausea and vomiting are stomach flu and food poisoning. Nausea and vomiting from viral stomach flu will usually start to improve within 24 hours. Nausea and vomiting from food poisoning may last from 12 to 48 hours. The doctor has checked you carefully, but problems can develop later. If you notice any problems or new symptoms, get medical treatment right away. Follow-up care is a key part of your treatment and safety. Be sure to make and go to all appointments, and call your doctor if you are having problems. It's also a good idea to know your test results and keep a list of the medicines you take. How can you care for yourself at home? · To prevent dehydration, drink plenty of fluids, enough so that your urine is light yellow or clear like water. Choose water and other caffeine-free clear liquids until you feel better. If you have kidney, heart, or liver disease and have to limit fluids, talk with your doctor before you increase the amount of fluids you drink. · Rest in bed until you feel better.  
· When you are able to eat, try clear soups, mild foods, and liquids until all symptoms are gone for 12 to 48 hours. Other good choices include dry toast, crackers, cooked cereal, and gelatin dessert, such as Jell-O. When should you call for help? Call 911 anytime you think you may need emergency care. For example, call if: 
· You passed out (lost consciousness). Call your doctor now or seek immediate medical care if: 
· You have symptoms of dehydration, such as: ¨ Dry eyes and a dry mouth. ¨ Passing only a little dark urine. ¨ Feeling thirstier than usual. 
· You have new or worsening belly pain. · You have a new or higher fever. · You vomit blood or what looks like coffee grounds. Watch closely for changes in your health, and be sure to contact your doctor if: 
· You have ongoing nausea and vomiting. · Your vomiting is getting worse. · Your vomiting lasts longer than 2 days. · You are not getting better as expected. Where can you learn more? Go to http://philip-gloria.info/. Enter 25 [id number] in the search box to learn more about \"Nausea and Vomiting: Care Instructions. \" Current as of: March 20, 2017 Content Version: 11.3 © 1459-4866 [employer]. Care instructions adapted under license by [email or web address] (which disclaims liability or warranty for this information). If you have questions about a medical condition or this instruction, always ask your healthcare professional. [place] [address] any warranty or liability for your use of this information. and appropriate educational materials and side effects teaching were provided. Discharge Orders None [facility] Announcement We are excited to announce that we are making your provider's discharge notes available to you in [email or web address]. You will see these notes when they are completed and signed by the physician that discharged you from your recent hospital stay.   If you have any questions or concerns about any information you see in AFreeze, please call the Health Information Department where you were seen or reach out to your Primary Care Provider for more information about your plan of care. Introducing [facility]! Dear Laura Herron: Thank you for requesting a AFreeze account. Our records indicate that you already have an active AFreeze account. You can access your account anytime at [email or web address] Did you know that you can access your hospital and ER discharge instructions at any time in AFreeze? You can also review all of your test results from your hospital stay or ER visit. Additional Information If you have questions, please visit the Frequently Asked Questions section of the AFreeze website at [email or web address] Remember, AFreeze is NOT to be used for urgent needs. For medical emergencies, dial 911. Now available from your iPhone and Android! General Information Please provide this summary of care documentation to your next provider. Patient Signature:  ____________________________________________________________ Date:  ____________________________________________________________  
  
[facility] Provider Signature:  ____________________________________________________________ Date:  ____________________________________________________________

## 2017-07-13 NOTE — IP AVS SNAPSHOT
Current Discharge Medication List  
  
START taking these medications Dose & Instructions Dispensing Information Comments Morning Noon Evening Bedtime  
 pantoprazole 40 mg tablet Commonly known as:  PROTONIX Your last dose was: Your next dose is:    
   
   
 Dose:  40 mg Take 1 Tab by mouth Before breakfast and dinner. Indications: Suspected small bowel ulcer Quantity:  60 Tab Refills:  0 CONTINUE these medications which have NOT CHANGED Dose & Instructions Dispensing Information Comments Morning Noon Evening Bedtime  
 cpap machine kit Your last dose was: Your next dose is:    
   
   
 by Does Not Apply route. 10 cm with O2 @@ 4 lpm  
 Refills:  0 INVOKANA 300 mg tablet Generic drug:  canagliflozin Your last dose was: Your next dose is:    
   
   
 Dose:  300 mg Take 300 mg by mouth daily. Refills:  0  
     
   
   
   
  
 levothyroxine 50 mcg tablet Commonly known as:  SYNTHROID Your last dose was: Your next dose is:    
   
   
 Dose:  50 mcg Take 50 mcg by mouth Daily (before breakfast). Refills:  0  
     
   
   
   
  
 losartan 100 mg tablet Commonly known as:  COZAAR Your last dose was: Your next dose is:    
   
   
 Dose:  100 mg Take 100 mg by mouth daily. Refills:  0  
     
   
   
   
  
 lovastatin 40 mg tablet Commonly known as:  MEVACOR Your last dose was: Your next dose is:    
   
   
 Dose:  40 mg Take 40 mg by mouth nightly. Refills:  0  
     
   
   
   
  
 metFORMIN 500 mg tablet Commonly known as:  GLUCOPHAGE Your last dose was: Your next dose is:    
   
   
 Dose:  1000 mg Take 1,000 mg by mouth two (2) times daily (with meals). Refills:  0  
     
   
   
   
  
 nadolol 80 mg tablet Commonly known as:  CORGARD Your last dose was: Your next dose is:    
   
   
 Dose:  80 mg Take 80 mg by mouth daily. Refills:  0  
     
   
   
   
  
 oxybutynin 5 mg tablet Commonly known as:  YBFOCHGA Your last dose was: Your next dose is:    
   
   
 Dose:  5 mg Take 5 mg by mouth two (2) times a day. Refills:  0  
     
   
   
   
  
 oxyCODONE IR 10 mg Tab immediate release tablet Commonly known as:  Kenn Deloris Your last dose was: Your next dose is:    
   
   
 Dose:  10 mg Take 10 mg by mouth four (4) times daily. Refills:  0 OXYGEN-AIR DELIVERY SYSTEMS Your last dose was: Your next dose is:    
   
   
 by Does Not Apply route. 4 lpm bled into cpap Refills:  0 PARoxetine 20 mg tablet Commonly known as:  PAXIL Your last dose was: Your next dose is:    
   
   
 Dose:  20 mg Take 20 mg by mouth daily. Refills:  0  
     
   
   
   
  
 pioglitazone 30 mg tablet Commonly known as:  ACTOS Your last dose was: Your next dose is: Take  by mouth daily. Refills:  0  
     
   
   
   
  
 sucralfate 100 mg/mL suspension Commonly known as:  Florin Turcios Your last dose was: Your next dose is:    
   
   
 Dose:  1 g Take 10 mL by mouth four (4) times daily. Quantity:  414 mL Refills:  1  
     
   
   
   
  
 zolpidem 10 mg tablet Commonly known as:  AMBIEN Your last dose was: Your next dose is: Take  by mouth nightly as needed. Refills:  0 STOP taking these medications   
 omeprazole 20 mg capsule Commonly known as:  PRILOSEC Where to Get Your Medications Information on where to get these meds will be given to you by the nurse or doctor. ! Ask your nurse or doctor about these medications  
  pantoprazole 40 mg tablet  
 sucralfate 100 mg/mL suspension

## 2017-07-14 ENCOUNTER — APPOINTMENT (OUTPATIENT)
Dept: ULTRASOUND IMAGING | Age: 72
End: 2017-07-14
Attending: INTERNAL MEDICINE
Payer: MEDICARE

## 2017-07-14 LAB
ALBUMIN SERPL BCP-MCNC: 2.7 G/DL (ref 3.2–4.6)
ALBUMIN/GLOB SERPL: 0.8 {RATIO} (ref 1.2–3.5)
ALP SERPL-CCNC: 40 U/L (ref 50–136)
ALT SERPL-CCNC: 11 U/L (ref 12–65)
ANION GAP BLD CALC-SCNC: 7 MMOL/L (ref 7–16)
AST SERPL W P-5'-P-CCNC: 13 U/L (ref 15–37)
BILIRUB SERPL-MCNC: 0.3 MG/DL (ref 0.2–1.1)
BUN SERPL-MCNC: 9 MG/DL (ref 8–23)
CALCIUM SERPL-MCNC: 8.4 MG/DL (ref 8.3–10.4)
CHLORIDE SERPL-SCNC: 109 MMOL/L (ref 98–107)
CO2 SERPL-SCNC: 28 MMOL/L (ref 21–32)
CREAT SERPL-MCNC: 0.37 MG/DL (ref 0.6–1)
CRP SERPL-MCNC: 1.6 MG/DL (ref 0–0.9)
ERYTHROCYTE [DISTWIDTH] IN BLOOD BY AUTOMATED COUNT: 16.6 % (ref 11.9–14.6)
EST. AVERAGE GLUCOSE BLD GHB EST-MCNC: 140 MG/DL
FERRITIN SERPL-MCNC: 8 NG/ML (ref 8–388)
FOLATE SERPL-MCNC: 23.9 NG/ML (ref 3.1–17.5)
GLOBULIN SER CALC-MCNC: 3.6 G/DL (ref 2.3–3.5)
GLUCOSE BLD STRIP.AUTO-MCNC: 76 MG/DL (ref 65–100)
GLUCOSE BLD STRIP.AUTO-MCNC: 78 MG/DL (ref 65–100)
GLUCOSE SERPL-MCNC: 78 MG/DL (ref 65–100)
HBA1C MFR BLD: 6.5 % (ref 4.8–6)
HCT VFR BLD AUTO: 32 % (ref 35.8–46.3)
HCT VFR BLD AUTO: 33.1 % (ref 35.8–46.3)
HCT VFR BLD AUTO: 33.3 % (ref 35.8–46.3)
HGB BLD-MCNC: 10.3 G/DL (ref 11.7–15.4)
HGB BLD-MCNC: 10.4 G/DL (ref 11.7–15.4)
HGB BLD-MCNC: 9.9 G/DL (ref 11.7–15.4)
HGB RETIC QN AUTO: 23 PG (ref 29–35)
IMM RETICS NFR: 35.4 % (ref 3–15.9)
IRON SATN MFR SERPL: 6 %
IRON SERPL-MCNC: 21 UG/DL (ref 35–150)
IRON SERPL-MCNC: 23 UG/DL (ref 35–150)
LACTATE SERPL-SCNC: 1.1 MMOL/L (ref 0.4–2)
MCH RBC QN AUTO: 25.6 PG (ref 26.1–32.9)
MCHC RBC AUTO-ENTMCNC: 30.9 G/DL (ref 31.4–35)
MCV RBC AUTO: 82.7 FL (ref 79.6–97.8)
PLATELET # BLD AUTO: 319 K/UL (ref 150–450)
PMV BLD AUTO: 9 FL (ref 10.8–14.1)
POTASSIUM SERPL-SCNC: 3.8 MMOL/L (ref 3.5–5.1)
PROCALCITONIN SERPL-MCNC: 1 NG/ML
PROT SERPL-MCNC: 6.3 G/DL (ref 6.3–8.2)
RBC # BLD AUTO: 3.87 M/UL (ref 4.05–5.25)
RETICS # AUTO: 0.08 M/UL (ref 0.02–0.08)
RETICS/RBC NFR AUTO: 1.9 % (ref 0.3–2)
SODIUM SERPL-SCNC: 144 MMOL/L (ref 136–145)
TIBC SERPL-MCNC: 343 UG/DL (ref 250–450)
TROPONIN I SERPL-MCNC: <0.02 NG/ML (ref 0.02–0.05)
TROPONIN I SERPL-MCNC: <0.02 NG/ML (ref 0.02–0.05)
VIT B12 SERPL-MCNC: 1069 PG/ML (ref 193–986)
WBC # BLD AUTO: 11.6 K/UL (ref 4.3–11.1)

## 2017-07-14 PROCEDURE — 83540 ASSAY OF IRON: CPT | Performed by: HOSPITALIST

## 2017-07-14 PROCEDURE — 99218 HC RM OBSERVATION: CPT

## 2017-07-14 PROCEDURE — 83036 HEMOGLOBIN GLYCOSYLATED A1C: CPT | Performed by: HOSPITALIST

## 2017-07-14 PROCEDURE — 77030032490 HC SLV COMPR SCD KNE COVD -B

## 2017-07-14 PROCEDURE — 80053 COMPREHEN METABOLIC PANEL: CPT | Performed by: HOSPITALIST

## 2017-07-14 PROCEDURE — 85018 HEMOGLOBIN: CPT | Performed by: HOSPITALIST

## 2017-07-14 PROCEDURE — 76700 US EXAM ABDOM COMPLETE: CPT

## 2017-07-14 PROCEDURE — 86140 C-REACTIVE PROTEIN: CPT | Performed by: HOSPITALIST

## 2017-07-14 PROCEDURE — 74011250636 HC RX REV CODE- 250/636: Performed by: HOSPITALIST

## 2017-07-14 PROCEDURE — 85046 RETICYTE/HGB CONCENTRATE: CPT | Performed by: HOSPITALIST

## 2017-07-14 PROCEDURE — 82746 ASSAY OF FOLIC ACID SERUM: CPT | Performed by: HOSPITALIST

## 2017-07-14 PROCEDURE — 82728 ASSAY OF FERRITIN: CPT | Performed by: HOSPITALIST

## 2017-07-14 PROCEDURE — 74011250637 HC RX REV CODE- 250/637: Performed by: HOSPITALIST

## 2017-07-14 PROCEDURE — C9113 INJ PANTOPRAZOLE SODIUM, VIA: HCPCS | Performed by: HOSPITALIST

## 2017-07-14 PROCEDURE — 84145 PROCALCITONIN (PCT): CPT | Performed by: HOSPITALIST

## 2017-07-14 PROCEDURE — 96376 TX/PRO/DX INJ SAME DRUG ADON: CPT

## 2017-07-14 PROCEDURE — 82607 VITAMIN B-12: CPT | Performed by: HOSPITALIST

## 2017-07-14 PROCEDURE — 36415 COLL VENOUS BLD VENIPUNCTURE: CPT | Performed by: HOSPITALIST

## 2017-07-14 PROCEDURE — 74011000250 HC RX REV CODE- 250: Performed by: HOSPITALIST

## 2017-07-14 PROCEDURE — 85027 COMPLETE CBC AUTOMATED: CPT | Performed by: HOSPITALIST

## 2017-07-14 PROCEDURE — 82962 GLUCOSE BLOOD TEST: CPT

## 2017-07-14 PROCEDURE — 84484 ASSAY OF TROPONIN QUANT: CPT | Performed by: HOSPITALIST

## 2017-07-14 PROCEDURE — 83605 ASSAY OF LACTIC ACID: CPT | Performed by: HOSPITALIST

## 2017-07-14 RX ORDER — SODIUM CHLORIDE 0.9 % (FLUSH) 0.9 %
5-10 SYRINGE (ML) INJECTION EVERY 8 HOURS
Status: DISCONTINUED | OUTPATIENT
Start: 2017-07-14 | End: 2017-07-15 | Stop reason: HOSPADM

## 2017-07-14 RX ORDER — LOSARTAN POTASSIUM 100 MG/1
100 TABLET ORAL
COMMUNITY

## 2017-07-14 RX ORDER — LEVOTHYROXINE SODIUM 50 UG/1
50 TABLET ORAL
COMMUNITY
End: 2017-07-26

## 2017-07-14 RX ORDER — OXYBUTYNIN CHLORIDE 5 MG/1
5 TABLET ORAL 3 TIMES DAILY
Status: DISCONTINUED | OUTPATIENT
Start: 2017-07-14 | End: 2017-07-15 | Stop reason: HOSPADM

## 2017-07-14 RX ORDER — ONDANSETRON 2 MG/ML
4 INJECTION INTRAMUSCULAR; INTRAVENOUS
Status: DISCONTINUED | OUTPATIENT
Start: 2017-07-14 | End: 2017-07-15 | Stop reason: HOSPADM

## 2017-07-14 RX ORDER — ACETAMINOPHEN 325 MG/1
650 TABLET ORAL
Status: DISCONTINUED | OUTPATIENT
Start: 2017-07-14 | End: 2017-07-15 | Stop reason: HOSPADM

## 2017-07-14 RX ORDER — MORPHINE SULFATE 2 MG/ML
2 INJECTION, SOLUTION INTRAMUSCULAR; INTRAVENOUS
Status: DISCONTINUED | OUTPATIENT
Start: 2017-07-14 | End: 2017-07-15 | Stop reason: HOSPADM

## 2017-07-14 RX ORDER — PAROXETINE HYDROCHLORIDE 20 MG/1
20 TABLET, FILM COATED ORAL DAILY
Status: DISCONTINUED | OUTPATIENT
Start: 2017-07-14 | End: 2017-07-15 | Stop reason: HOSPADM

## 2017-07-14 RX ORDER — SODIUM CHLORIDE 0.9 % (FLUSH) 0.9 %
5-10 SYRINGE (ML) INJECTION AS NEEDED
Status: DISCONTINUED | OUTPATIENT
Start: 2017-07-14 | End: 2017-07-15 | Stop reason: HOSPADM

## 2017-07-14 RX ORDER — SIMVASTATIN 20 MG/1
20 TABLET, FILM COATED ORAL
Status: DISCONTINUED | OUTPATIENT
Start: 2017-07-14 | End: 2017-07-15 | Stop reason: HOSPADM

## 2017-07-14 RX ORDER — OXYCODONE HYDROCHLORIDE 10 MG/1
10 TABLET ORAL AS NEEDED
COMMUNITY
End: 2018-04-18

## 2017-07-14 RX ORDER — HYDROCODONE BITARTRATE AND ACETAMINOPHEN 5; 325 MG/1; MG/1
1 TABLET ORAL
Status: DISCONTINUED | OUTPATIENT
Start: 2017-07-14 | End: 2017-07-15 | Stop reason: HOSPADM

## 2017-07-14 RX ORDER — ZOLPIDEM TARTRATE 5 MG/1
5 TABLET ORAL
Status: DISCONTINUED | OUTPATIENT
Start: 2017-07-14 | End: 2017-07-15 | Stop reason: HOSPADM

## 2017-07-14 RX ORDER — NADOLOL 80 MG/1
80 TABLET ORAL
COMMUNITY

## 2017-07-14 RX ORDER — NADOLOL 40 MG/1
40 TABLET ORAL DAILY
Status: DISCONTINUED | OUTPATIENT
Start: 2017-07-14 | End: 2017-07-15 | Stop reason: HOSPADM

## 2017-07-14 RX ADMIN — OXYBUTYNIN CHLORIDE 5 MG: 5 TABLET ORAL at 10:53

## 2017-07-14 RX ADMIN — SIMVASTATIN 20 MG: 20 TABLET, FILM COATED ORAL at 23:02

## 2017-07-14 RX ADMIN — Medication 5 ML: at 06:00

## 2017-07-14 RX ADMIN — SODIUM CHLORIDE 40 MG: 9 INJECTION INTRAMUSCULAR; INTRAVENOUS; SUBCUTANEOUS at 23:02

## 2017-07-14 RX ADMIN — Medication 10 ML: at 15:33

## 2017-07-14 RX ADMIN — OXYBUTYNIN CHLORIDE 5 MG: 5 TABLET ORAL at 23:02

## 2017-07-14 RX ADMIN — PAROXETINE HYDROCHLORIDE 20 MG: 20 TABLET, FILM COATED ORAL at 10:53

## 2017-07-14 RX ADMIN — Medication 10 ML: at 23:02

## 2017-07-14 RX ADMIN — MORPHINE SULFATE 2 MG: 2 INJECTION, SOLUTION INTRAMUSCULAR; INTRAVENOUS at 13:47

## 2017-07-14 RX ADMIN — MORPHINE SULFATE 2 MG: 2 INJECTION, SOLUTION INTRAMUSCULAR; INTRAVENOUS at 01:28

## 2017-07-14 RX ADMIN — MORPHINE SULFATE 2 MG: 2 INJECTION, SOLUTION INTRAMUSCULAR; INTRAVENOUS at 21:20

## 2017-07-14 RX ADMIN — MORPHINE SULFATE 2 MG: 2 INJECTION, SOLUTION INTRAMUSCULAR; INTRAVENOUS at 15:26

## 2017-07-14 RX ADMIN — SODIUM CHLORIDE 40 MG: 9 INJECTION INTRAMUSCULAR; INTRAVENOUS; SUBCUTANEOUS at 10:52

## 2017-07-14 RX ADMIN — ZOLPIDEM TARTRATE 5 MG: 5 TABLET, COATED ORAL at 01:28

## 2017-07-14 RX ADMIN — Medication 5 ML: at 07:00

## 2017-07-14 RX ADMIN — HYDROCODONE BITARTRATE AND ACETAMINOPHEN 1 TABLET: 5; 325 TABLET ORAL at 10:53

## 2017-07-14 RX ADMIN — HYDROCODONE BITARTRATE AND ACETAMINOPHEN 1 TABLET: 5; 325 TABLET ORAL at 15:25

## 2017-07-14 RX ADMIN — OXYBUTYNIN CHLORIDE 5 MG: 5 TABLET ORAL at 15:25

## 2017-07-14 RX ADMIN — NADOLOL 40 MG: 40 TABLET ORAL at 10:53

## 2017-07-14 NOTE — PROGRESS NOTES
TRANSFER - IN REPORT:    Verbal report received from [facility] on Quiana Peppers  being received from ED for routine progression of care      Report consisted of patients Situation, Background, Assessment and   Recommendations(SBAR). Information from the following report(s) SBAR, Kardex, [facility] and Recent Results was reviewed with the receiving nurse. Opportunity for questions and clarification was provided. Assessment completed upon patients arrival to unit and care assumed.

## 2017-07-14 NOTE — PROGRESS NOTES
GI orders for NPO except for sips of water to take medicines and some ice chips. GI physician not comfortable with giving pt more than that due to abdominal pain.

## 2017-07-14 NOTE — CONSULTS
For cough and GERD    [facility] Consult Note       Primary GI Physician:  Dr Neftaly Lee    Referring Physician:  Dr Arpita Hagan    Consult Date:  7/14/2017    Admit Date:  7/13/2017    Chief Complaint:  ABD Pain    Subjective:     History of Present Illness:  Patient is a 67 y.o. female with PMH of DM, HTN and pulmonary HTN, who is seen in consultation at the request of Dr. Kimberly Garcia for abd pain. She reports several months of generalized abd pain described as crampy in her mid abd around her umbilical area and radiating right and left. She also reports epigastric pain. She reports nausea and vomiting of bilious material associated with abd pain. She denies hematemesis or coffee ground emesis. She reports frequent Aleve use for back pain. Saw pain mgt and has back injection pending. Takes Prilosec 20 mg daily for GERD. Reports worsening GERD symptoms in the last few months. Also reports 60 # weight loss over this time. She denies ETOH use. She presented to the ED 6/22 with abd pain and was dx w CT confirmed diverticulitis for which she was treated with Cipro and Flagyl. She reports finishing the entire antibiotic course with minimal improvement in abd pain. She reports pasty stools up to 4 times daily and feels they have had more form since antibiotic therapy. She denies rectal bleed. She underwent EGD and colo in 2009 for GERD, cough and a history of colon polyps. EGD with HP gastric polyps only. [place] with TA colon polyps x2 and a recommended 3 year recall. She denies CP or SOB. She has a hx of pulmonary HTN. She is not on home O2. She is on CPAP at . She last saw pulmonology about 3 years ago. She reports off and on subjective fever. LFTs and lipase were normal yesterday on arrival at 2 pm.  EKG in ED with NSR.  ECHO in 2014 w EF 60-65%. CT today with diverticulosis and moderatly prominent stool.      CT ABD/P 6/22/17  IMPRESSION: Mild proximal sigmoid acute diverticulitis. 2. Tiny focus of urinary bladder luminal gas. Correlate for recent  instrumentation. Fistula or airforming infection can also cause this. CT ABD/P 7/14/17  FINDINGS: The partially seen lung bases are clear.   Abdomen: There are no suspicious lesions in the liver or spleen. The adrenal  glands, gallbladder and pancreas appear unremarkable. There is normal  enhancement of the kidneys, no hydronephrosis.     No lymphadenopathy, free air, focal inflammatory changes or fluid collections in  the abdomen. Aorta normal caliber with scattered atherosclerotic change again  noted. Major vessels do contain contrast. Right lower quadrant appendix is  unremarkable.  Clemencia Hick is no evidence of bowel obstruction. Moderately prominent stool throughout  with diverticulosis, but no definite diverticulitis. There is only partial or  contrast opacification of GI tract which limits detail.   Pelvis:  No acute inflammatory changes or fluid collections in the pelvis. No  lymphadenopathy. Unremarkable urinary bladder which is decompressed. Ovaries are  not enlarged. Small calcifications in the uterus may indicate degenerated  fibroids.   Bones: No evidence of a displaced fracture or dislocation. Low bone density. Multilevel mild degenerative changes of spine. IMPRESSION:   1. Diverticulosis. 2. No bowel obstruction, abscess, or acute abnormality otherwise. EGD and colo on 8/20/09 Dr Ewa Chowdhury  EGD for cough and GERD with HP gastric polyps.   COLO for a personal history of colo plops with diverticulosis and descendiing and cecal TA colon polyp       PMH:  Past Medical History:   Diagnosis Date    Diabetes ([facility] [address]     Hypertension     Pulmonary nodule     Completed surveillance in 2011       [address]:  Past Surgical History:   Procedure Laterality Date    HX CARPAL TUNNEL RELEASE  1988    HX KNEE ARTHROSCOPY  2008    Left knee partial replacement    HX SHOULDER ARTHROSCOPY  1989    Rotator cuff       Allergies:  No Known Allergies    Home Medications:  Prior to Admission medications    Medication Sig Start Date End Date Taking? Authorizing Provider   sucralfate (CARAFATE) 100 mg/mL suspension Take 10 mL by mouth four (4) times daily. 6/22/17   Adonis Young MD   glipiZIDE (GLUCOTROL) 5 mg tablet Take  by mouth two (2) times a day. 2 tabs po bid    Historical Provider   metFORMIN (GLUCOPHAGE) 500 mg tablet Take  by mouth two (2) times daily (with meals). Historical Provider   PARoxetine (PAXIL) 20 mg tablet Take 20 mg by mouth daily. Historical Provider   pioglitazone (ACTOS) 30 mg tablet Take  by mouth daily. Historical Provider   nadolol (CORGARD) 40 mg tablet Take  by mouth daily. Historical Provider   omeprazole (PRILOSEC) 20 mg capsule Take 20 mg by mouth daily. Historical Provider   oxybutynin (DITROPAN) 5 mg tablet Take 5 mg by mouth three (3) times daily. Historical Provider   losartan (COZAAR) 50 mg tablet Take  by mouth daily. Historical Provider   lovastatin (MEVACOR) 40 mg tablet Take 40 mg by mouth nightly. Historical Provider   zolpidem (AMBIEN) 10 mg tablet Take  by mouth nightly as needed. Historical Provider   OXYGEN-AIR DELIVERY SYSTEMS by Does Not Apply route. 4 lpm bled into cpap    Historical Provider   cpap machine kit by Does Not Apply route.  10 cm with O2 @@ 4 lpm    Historical Provider       Hospital Medications:  Current Facility-Administered Medications   Medication Dose Route Frequency    simvastatin (ZOCOR) tablet 20 mg  20 mg Oral QHS    nadolol (CORGARD) tablet 40 mg  40 mg Oral DAILY    oxybutynin (DITROPAN) tablet 5 mg  5 mg Oral TID    PARoxetine (PAXIL) tablet 20 mg  20 mg Oral DAILY    zolpidem (AMBIEN) tablet 5 mg  5 mg Oral QHS PRN    sodium chloride (NS) flush 5-10 mL  5-10 mL IntraVENous Q8H    sodium chloride (NS) flush 5-10 mL  5-10 mL IntraVENous PRN    acetaminophen (TYLENOL) tablet 650 mg  650 mg Oral Q4H PRN    HYDROcodone-acetaminophen (NORCO) 5-325 mg per tablet 1 Tab  1 Tab Oral Q4H PRN    morphine injection 2 mg  2 mg IntraVENous Q3H PRN    pantoprazole (PROTONIX) 40 mg in sodium chloride 0.9 % 10 mL injection  40 mg IntraVENous Q12H    hydrALAZINE (APRESOLINE) 20 mg/mL injection 10 mg  10 mg IntraVENous Q6H PRN     Current Outpatient Prescriptions   Medication Sig    sucralfate (CARAFATE) 100 mg/mL suspension Take 10 mL by mouth four (4) times daily.  glipiZIDE (GLUCOTROL) 5 mg tablet Take  by mouth two (2) times a day. 2 tabs po bid    metFORMIN (GLUCOPHAGE) 500 mg tablet Take  by mouth two (2) times daily (with meals).  PARoxetine (PAXIL) 20 mg tablet Take 20 mg by mouth daily.  pioglitazone (ACTOS) 30 mg tablet Take  by mouth daily.  nadolol (CORGARD) 40 mg tablet Take  by mouth daily.  omeprazole (PRILOSEC) 20 mg capsule Take 20 mg by mouth daily.  oxybutynin (DITROPAN) 5 mg tablet Take 5 mg by mouth three (3) times daily.  losartan (COZAAR) 50 mg tablet Take  by mouth daily.  lovastatin (MEVACOR) 40 mg tablet Take 40 mg by mouth nightly.  zolpidem (AMBIEN) 10 mg tablet Take  by mouth nightly as needed.  OXYGEN-AIR DELIVERY SYSTEMS by Does Not Apply route. 4 lpm bled into cpap    cpap machine kit by Does Not Apply route. 10 cm with O2 @@ 4 lpm       Social History:  Social History   Substance Use Topics    Smoking status: Never Smoker    Smokeless tobacco: Never Used    Alcohol use No       Pt denies any history of drug use, blood transfusions, or tattoos. Family History:  History reviewed. No pertinent family history. Review of Systems:  A detailed 10 system ROS is obtained, with pertinent positives as listed above. All others are negative.     Diet:  NPO    Objective:     Physical Exam:  Vitals:  Visit Vitals    /59    Pulse 66    Temp 97.8 °F (36.6 °C)    Resp 18    Ht 5' 6\" (1.676 m)    Wt 108 kg (238 lb)    BMI 38.41 kg/m2     Gen:  Pt is alert, cooperative, no acute distress, obese  Skin:  Extremities and face reveal no rashes. HEENT: Sclerae anicteric. Extra-occular muscles are intact. No oral ulcers. No abnormal pigmentation of the lips. The neck is supple. Cardiovascular: Regular rate and rhythm. No murmurs, gallops, or rubs. Respiratory:  Comfortable breathing with no accessory muscle use. Clear breath sounds anteriorly with no wheezes, rales, or rhonchi. GI:  Abdomen nondistended, soft. Mod diffusely ttp over entire abd and epigastrium. Normal active bowel sounds. No enlargement of the liver or spleen. No masses palpable. Rectal:  Deferred  Musculoskeletal:  No pitting edema of the lower legs. Neurological:  Gross memory appears intact. Patient is alert and oriented. Psychiatric:  Mood appears appropriate with judgement intact. Lymphatic:  No cervical or supraclavicular adenopathy. Laboratory:    Recent Labs      07/14/17   0415  07/14/17   0010  07/13/17   1744   WBC  11.6*   --   12.0*   HGB  9.9*  10.3*  11.4*   HCT  32.0*  33.3*  35.1*   PLT  319   --   374   MCV  82.7   --   80.1   NA  144   --   144   K  3.8   --   3.8   CL  109*   --   107   CO2  28   --   26   BUN  9   --   8   CREA  0.37*   --   0.41*   CA  8.4   --   9.4   GLU  78   --   102*   AP  40*   --   46*   SGOT  13*   --   15   ALT  11*   --   12   TBILI  0.3   --   0.4   ALB  2.7*   --   2.9*   TP  6.3   --   6.9   LPSE   --    --   113          Assessment:     Principal Problem:    Abdominal pain (7/13/2017)    Active Problems:    Diabetes mellitus ([facility] [address] (8/29/2013)      Obesity (8/29/2013)      Obstructive sleep apnea (8/29/2013)      Anemia (7/13/2017)      67 y.o. female with PMH of DM, HTN and pulmonary HTN (no O2 but CPAP at hs) admitted with epigastric abd pain,generalized abd pain, nausea and vomiting. She has a hx of GERD and takes Prilosec 20 mg daily. Has been taking Aleve daily. Denies melena, BRRB. Hgb has decreased to 9.9 (from 13 on 6/22).   She was treated for diverticulitis on 6/22. She reports N/V with food intake, can keep fluids down. Denies CP or SOB. Ct today w/o acute findings, moderate stool and diverticulosis. EGD in 2009 with HP gastric polyps. [place] in 2009 w TA x2.  Epigastric pain, N/V could be caused by PUD or biliary issues. LFTs and Lipase normal yesterday. No S/SX GIB. 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 Plan:     1) Increase PPI to BID  2) Patient will need non urgent EGD unless sx of GIB  3) US to evalaute biliary tract  4) Monitor H&H and for s/sx GIB  5) Patient will need a colonoscopy at some point, which can be an out patient when inflammation from diverticulitis is completely resolved   6) NPO   7) Consider fiber therapy for [facility] when taking PO, also could consider antispasmodic     Gavi Sports, NP  Patient is seen and examined in collaboration with Dr. Jose Guadalupe Ramírez. Assessment and plan as per Dr. Bernadine Brito. I have seen and examined the patient, and I have directed and agreed to the plan as described. CT and labs are negative. Hgb is low. She has frequent NSAID use. U/S is negative for cholecystitis. EGD is indicated to evaluate for PUD. Technique, benefits, and risks discussed. This will be tomorrow.     Phani Tapia MD

## 2017-07-14 NOTE — ED NOTES
TRANSFER - OUT REPORT:    Verbal report given to Marielena Crain on Sarina Lee  being transferred to  for routine progression of care       Report consisted of patients Situation, Background, Assessment and   Recommendations(SBAR). Information from the following report(s) SBAR, Kardex and MAR was reviewed with the receiving nurse. Lines:       Opportunity for questions and clarification was provided.

## 2017-07-14 NOTE — PROGRESS NOTES
Pt has home CPAP at bedside but wishes not to wear it while in ER waiting for admission bed. Pt instructed to call if changes her mind.

## 2017-07-14 NOTE — H&P
HOSPITALIST H&P/CONSULT  NAME:  Carlos Yousif   Age:  67 y.o.  :   1945   DOS:               17  MRN:   [id number]  PCP: Ludin Ugalde MD  Consulting MD:  Treatment Team: Attending Provider: Sara Leonard DO; Primary Nurse: Atif Santos RN    HPI:   Ms. Matilde Soni is a 66 yo F with PMHx of Morbid Obesity, LALA on CPAP and O2 supplementation , pulmonary HTN, HTN, DM who complains of increasing epigastric abdominal pain, N/V and inability to keep PO intake. Complains of burning epigastric abdominal pain with radiation on RUQ for last 6 months associated with soft stools and denis loss of approx 60 lbs. Pain is 8/10, sharp. Pain is getting worse with spicy food. PMHx of GERD and she had colonoscopy and EGD approx 5 years prior  Ans states that was WNL - no records to review. Sen also on  in ED and referred to outpatient GI, although her pain is increasing and she is unable to keep PO feeding and returned to ED. Placed on PPPI and Carafate w/o significant improvement. Denies SOB, CP or recent spikes of fever. H/H is 11.4/35.1 from 13.0/40/2 3 weeks prior. 10+ point ROS done and is negative except as noted in HPI. Past Medical History:   Diagnosis Date    Diabetes ([facility] [address]     Hypertension     Pulmonary nodule     Completed surveillance in       Past Surgical History:   Procedure Laterality Date    HX CARPAL TUNNEL RELEASE      HX KNEE ARTHROSCOPY      Left knee partial replacement    HX SHOULDER ARTHROSCOPY      Rotator cuff      Prior to Admission Medications   Prescriptions Last Dose Informant Patient Reported? Taking? OXYGEN-AIR DELIVERY SYSTEMS   Yes No   Sig: by Does Not Apply route. 4 lpm bled into cpap   PARoxetine (PAXIL) 20 mg tablet   Yes No   Sig: Take 20 mg by mouth daily. cpap machine kit   Yes No   Sig: by Does Not Apply route. 10 cm with O2 @@ 4 lpm   glipiZIDE (GLUCOTROL) 5 mg tablet   Yes No   Sig: Take  by mouth two (2) times a day.  2 tabs po bid losartan (COZAAR) 50 mg tablet   Yes No   Sig: Take  by mouth daily. lovastatin (MEVACOR) 40 mg tablet   Yes No   Sig: Take 40 mg by mouth nightly. metFORMIN (GLUCOPHAGE) 500 mg tablet   Yes No   Sig: Take  by mouth two (2) times daily (with meals). nadolol (CORGARD) 40 mg tablet   Yes No   Sig: Take  by mouth daily. omeprazole (PRILOSEC) 20 mg capsule   Yes No   Sig: Take 20 mg by mouth daily. oxybutynin (DITROPAN) 5 mg tablet   Yes No   Sig: Take 5 mg by mouth three (3) times daily. pioglitazone (ACTOS) 30 mg tablet   Yes No   Sig: Take  by mouth daily. sucralfate (CARAFATE) 100 mg/mL suspension   No No   Sig: Take 10 mL by mouth four (4) times daily. zolpidem (AMBIEN) 10 mg tablet   Yes No   Sig: Take  by mouth nightly as needed. Facility-Administered Medications: None     Home meds reconciled. No Known Allergies   Social History   Substance Use Topics    Smoking status: Never Smoker    Smokeless tobacco: Never Used    Alcohol use No      History reviewed. No pertinent family history. I personally reviewed home medications, social and family history. Immunization History   Administered Date(s) Administered    Influenza Vaccine 2013, 10/06/2014    Pneumococcal Vaccine (Unspecified Type) 2012     Objective:     Patient Vitals for the past 24 hrs:   Temp Pulse Resp BP   17 1730 97.8 °F (36.6 °C) 66 18 124/59     Temp (24hrs), Av.8 °F (36.6 °C), Min:97.8 °F (36.6 °C), Max:97.8 °F (36.6 °C)            Physical Exam:  General:         Alert, cooperative, afebrile. Morbidly obese. Mild abdominal pain. HEENT:               NCAT. No obvious deformity. Nares normal. No drainage  Lungs:           Decreased air entry b/l at the base  No wheezing/rhonchi/rales  Cardiovascular:   RRR. No m/r/g. No pedal edema b/l. +2 PT/DT pulses b/l. Abdomen:       Soft, tender in epigastric area, no rebound tenderness. Poole sign negative.  Bowel sounds normal. .   Skin:         No rashes or lesions. Not Jaundiced  Neurologic:    AAOx3. CN II- XII grossly WNL. No gross focal deficit. Moves all extremities. Psychiatric:         Good mood. Normal affect. Data Review:   Recent Results (from the past 24 hour(s))   EKG, 12 LEAD, INITIAL    Collection Time: 07/13/17  5:37 PM   Result Value Ref Range    Ventricular Rate 61 BPM    Atrial Rate 61 BPM    P-R Interval 166 ms    QRS Duration 136 ms    Q-T Interval 478 ms    QTC Calculation (Bezet) 481 ms    Calculated P Axis 25 degrees    Calculated R Axis 33 degrees    Calculated T Axis -23 degrees    Diagnosis       Normal sinus rhythm  Right bundle branch block  T wave abnormality, consider inferior ischemia  Abnormal ECG  When compared with ECG of 22-JUN-2017 15:58,  No significant change was found  Confirmed by ST YESICA TELLEZ MD (), DANIEL FLORES ([id number]) on 7/13/2017 9:04:59 PM     CBC WITH AUTOMATED DIFF    Collection Time: 07/13/17  5:44 PM   Result Value Ref Range    WBC 12.0 (H) 4.3 - 11.1 K/uL    RBC 4.38 4.05 - 5.25 M/uL    HGB 11.4 (L) 11.7 - 15.4 g/dL    HCT 35.1 (L) 35.8 - 46.3 %    MCV 80.1 79.6 - 97.8 FL    MCH 26.0 (L) 26.1 - 32.9 PG    MCHC 32.5 31.4 - 35.0 g/dL    RDW 16.6 (H) 11.9 - 14.6 %    PLATELET 699 914 - 652 K/uL    MPV 9.1 (L) 10.8 - 14.1 FL    DF AUTOMATED      NEUTROPHILS 81 (H) 43 - 78 %    LYMPHOCYTES 11 (L) 13 - 44 %    MONOCYTES 5 4.0 - 12.0 %    EOSINOPHILS 2 0.5 - 7.8 %    BASOPHILS 0 0.0 - 2.0 %    IMMATURE GRANULOCYTES 0.7 0.0 - 5.0 %    ABS. NEUTROPHILS 9.8 (H) 1.7 - 8.2 K/UL    ABS. LYMPHOCYTES 1.3 0.5 - 4.6 K/UL    ABS. MONOCYTES 0.6 0.1 - 1.3 K/UL    ABS. EOSINOPHILS 0.2 0.0 - 0.8 K/UL    ABS. BASOPHILS 0.0 0.0 - 0.2 K/UL    ABS. IMM.  GRANS. 0.1 0.0 - 0.5 K/UL   METABOLIC PANEL, COMPREHENSIVE    Collection Time: 07/13/17  5:44 PM   Result Value Ref Range    Sodium 144 136 - 145 mmol/L    Potassium 3.8 3.5 - 5.1 mmol/L    Chloride 107 98 - 107 mmol/L    CO2 26 21 - 32 mmol/L    Anion gap 11 7 - 16 mmol/L Glucose 102 (H) 65 - 100 mg/dL    BUN 8 8 - 23 MG/DL    Creatinine 0.41 (L) 0.6 - 1.0 MG/DL    GFR est AA >60 >60 ml/min/1.73m2    GFR est non-AA >60 >60 ml/min/1.73m2    Calcium 9.4 8.3 - 10.4 MG/DL    Bilirubin, total 0.4 0.2 - 1.1 MG/DL    ALT (SGPT) 12 12 - 65 U/L    AST (SGOT) 15 15 - 37 U/L    Alk. phosphatase 46 (L) 50 - 136 U/L    Protein, total 6.9 6.3 - 8.2 g/dL    Albumin 2.9 (L) 3.2 - 4.6 g/dL    Globulin 4.0 (H) 2.3 - 3.5 g/dL    A-G Ratio 0.7 (L) 1.2 - 3.5     LIPASE    Collection Time: 07/13/17  5:44 PM   Result Value Ref Range    Lipase 113 73 - 393 U/L   TROPONIN I    Collection Time: 07/13/17  5:44 PM   Result Value Ref Range    Troponin-I, Qt. <0.02 (L) 0.02 - 0.05 NG/ML     All Micro Results     Procedure Component Value Units Date/Time    CULTURE, STOOL [[id number]]     Order Status:  Sent Specimen:  Stool           Imaging /Procedures /Studies:  I personally reviewed all labs, imaging, and other studies this admission:    CT Results  (Last 48 hours)               07/13/17 2133  CT ABD PELV W CONT Final result    Impression:  IMPRESSION:    1. Diverticulosis. 2. No bowel obstruction, abscess, or acute abnormality otherwise. Narrative:  CT of the Abdomen and Pelvis with contrast       CLINICAL INDICATION:  Acute moderate nausea, vomiting, diarrhea, generalized   abdominal pain and epigastric pain, leukocytosis       COMPARISON: 6/22/2017       TECHNIQUE: Automated exposure Control was used. Multiple axial images were   obtained through the abdomen and pelvis after intravenous injection of 125cc of   isovue 370. Oral contrast given for evaluation of GI tract. Coronal reformatted   images obtained for further evaluation of organs. FINDINGS: The partially seen lung bases are clear. Abdomen: There are no suspicious lesions in the liver or spleen. The adrenal   glands, gallbladder and pancreas appear unremarkable.   There is normal   enhancement of the kidneys, no hydronephrosis. No lymphadenopathy, free air, focal inflammatory changes or fluid collections in   the abdomen. Aorta normal caliber with scattered atherosclerotic change again   noted. Major vessels do contain contrast. Right lower quadrant appendix is   unremarkable. There is no evidence of bowel obstruction. Moderately prominent stool throughout   with diverticulosis, but no definite diverticulitis. There is only partial or   contrast opacification of GI tract which limits detail. Pelvis:  No acute inflammatory changes or fluid collections in the pelvis. No   lymphadenopathy. Unremarkable urinary bladder which is decompressed. Ovaries are   not enlarged. Small calcifications in the uterus may indicate degenerated   fibroids. Bones: No evidence of a displaced fracture or dislocation. Low bone density. Multilevel mild degenerative changes of spine. Assessment and Plan: Active Hospital Problems    Diagnosis Date Noted    Abdominal pain 07/13/2017    Anemia 07/13/2017    Obstructive sleep apnea 08/29/2013    Obesity 08/29/2013    Diabetes mellitus ([facility] [address] 08/29/2013       PLAN  - start IV PPI BID. NPO  - serial H/H  - check anemia panel, hemoccult  - consult GI - if EGD/Colonoscopy planned will need pulmonary evaluation prior to anesthesia  - resume home CPAP with O2 supplementation blended  - on ISS - check HgA1c  - check stool studies, lactoferrin  - check lactic acid, CRP, procal  - resume home BP meds      DVT ppx: SCDs  Code status:  Full CODE  Estimated LOS:  1-2 days  Risk assessment:  moderate  Plan of care discussed with: patient and  at bedside in ED.   Care team.      Adela Art MD  07/13/17

## 2017-07-14 NOTE — PROGRESS NOTES
Hospitalist Progress Note    2017  Admit Date: 2017  6:54 PM   NAME: Usman Benson   :  1945   MRN:  [id number]   Attending: Meche Lee MD  PCP:  Dora Rojas MD    SUBJECTIVE:   Patient is a 66 yo F placed in observation for persistent nausea, vomiting, and mild drop in hemoglobin with use of NSAIDs. She reports she still feels nauseous but has not vomited since being here. She does report continued abdominal pain. She was seen by GI and plans for EGD tomorrow to rule out PUD. Review of Systems negative with exception of pertinent positives noted above  PHYSICAL EXAM     Visit Vitals    /59 (BP 1 Location: Right arm, BP Patient Position: At rest)    Pulse 82    Temp 98.6 °F (37 °C)    Resp 20    Ht 5' 6\" (1.676 m)    Wt 108 kg (238 lb)    SpO2 98%    BMI 38.41 kg/m2      Temp (24hrs), Av.4 °F (36.9 °C), Min:97.8 °F (36.6 °C), Max:98.6 °F (37 °C)    Oxygen Therapy  O2 Sat (%): 98 % (17 1347)  O2 Device: Room air (17 1347)  No intake or output data in the 24 hours ending 17 1720   General: No acute distress, obese    Lungs:  CTA Bilaterally. Heart:  Regular rate and rhythm,  No murmur, rub, or gallop  Abdomen: Soft, diffuse tenderness to palpation worse in epigastric area  Extremities: No cyanosis, clubbing or edema  Neurologic:  No focal deficits    ASSESSMENT      Active Hospital Problems    Diagnosis Date Noted    Abdominal pain 2017    Anemia 2017    Obstructive sleep apnea 2013    Obesity 2013    Diabetes mellitus ([facility] 2013     Plan:  · Continue current treatments. · Clear liquids for this evening; NPO after midnight. · EGD planned for tomorrow. DVT Prophylaxis: SCDs    Signed By: Laurita Uribe.  Je Benson MD     2017

## 2017-07-14 NOTE — PROGRESS NOTES
Pt arrived to floor around 1520. Pt is A&Ox4. Dual skin assessment completed with CARL Fortune. Scratches noted on lower back. Pt had scratched at home due to itching and pain. Skin is otherwise c/d/i. Pt complained of pain 8/10 in abdomen. 2 mg of morphine adminstered around 1530. Pt has not had any BM since she has arrived. No stool specimen obtained. Pt is currently sleeping in bed.

## 2017-07-14 NOTE — ED NOTES
Bedside/verbal report from [facility]. Patient is resting on stretcher. Patient is on cardiac monitor, cycling vital signs, and continuous pulse ox. Patient denies needs at this time. Stretcher in low position. Side rails x 2 for safety. Call light within reach. Patient's IV is covered with the [employer] cap protector. Will continue to monitor.

## 2017-07-14 NOTE — ED NOTES
This RN replaced transport order. Transport for patient had been kicked out of system due to being in system as STAT for over one hour.

## 2017-07-15 VITALS
OXYGEN SATURATION: 94 % | HEART RATE: 70 BPM | WEIGHT: 238 LBS | BODY MASS INDEX: 38.25 KG/M2 | SYSTOLIC BLOOD PRESSURE: 142 MMHG | HEIGHT: 66 IN | RESPIRATION RATE: 22 BRPM | TEMPERATURE: 97.6 F | DIASTOLIC BLOOD PRESSURE: 60 MMHG

## 2017-07-15 LAB
ALBUMIN SERPL BCP-MCNC: 3.1 G/DL (ref 3.2–4.6)
ALBUMIN/GLOB SERPL: 0.8 {RATIO} (ref 1.2–3.5)
ALP SERPL-CCNC: 45 U/L (ref 50–136)
ALT SERPL-CCNC: 11 U/L (ref 12–65)
ANION GAP BLD CALC-SCNC: 12 MMOL/L (ref 7–16)
AST SERPL W P-5'-P-CCNC: 11 U/L (ref 15–37)
BASOPHILS # BLD AUTO: 0 K/UL (ref 0–0.2)
BASOPHILS # BLD: 0 % (ref 0–2)
BILIRUB SERPL-MCNC: 0.3 MG/DL (ref 0.2–1.1)
BUN SERPL-MCNC: 11 MG/DL (ref 8–23)
CALCIUM SERPL-MCNC: 9 MG/DL (ref 8.3–10.4)
CHLORIDE SERPL-SCNC: 102 MMOL/L (ref 98–107)
CO2 SERPL-SCNC: 27 MMOL/L (ref 21–32)
CREAT SERPL-MCNC: 0.41 MG/DL (ref 0.6–1)
DIFFERENTIAL METHOD BLD: ABNORMAL
EOSINOPHIL # BLD: 0.3 K/UL (ref 0–0.8)
EOSINOPHIL NFR BLD: 2 % (ref 0.5–7.8)
ERYTHROCYTE [DISTWIDTH] IN BLOOD BY AUTOMATED COUNT: 16.7 % (ref 11.9–14.6)
GLOBULIN SER CALC-MCNC: 4.1 G/DL (ref 2.3–3.5)
GLUCOSE BLD STRIP.AUTO-MCNC: 116 MG/DL (ref 65–100)
GLUCOSE BLD STRIP.AUTO-MCNC: 88 MG/DL (ref 65–100)
GLUCOSE BLD STRIP.AUTO-MCNC: 97 MG/DL (ref 65–100)
GLUCOSE SERPL-MCNC: 80 MG/DL (ref 65–100)
HCT VFR BLD AUTO: 35.9 % (ref 35.8–46.3)
HGB BLD-MCNC: 11.2 G/DL (ref 11.7–15.4)
IMM GRANULOCYTES # BLD: 0.1 K/UL (ref 0–0.5)
IMM GRANULOCYTES NFR BLD AUTO: 0.5 % (ref 0–5)
LYMPHOCYTES # BLD AUTO: 18 % (ref 13–44)
LYMPHOCYTES # BLD: 2.1 K/UL (ref 0.5–4.6)
MCH RBC QN AUTO: 25.6 PG (ref 26.1–32.9)
MCHC RBC AUTO-ENTMCNC: 31.2 G/DL (ref 31.4–35)
MCV RBC AUTO: 82 FL (ref 79.6–97.8)
MONOCYTES # BLD: 0.9 K/UL (ref 0.1–1.3)
MONOCYTES NFR BLD AUTO: 8 % (ref 4–12)
NEUTS SEG # BLD: 7.8 K/UL (ref 1.7–8.2)
NEUTS SEG NFR BLD AUTO: 72 % (ref 43–78)
PLATELET # BLD AUTO: 377 K/UL (ref 150–450)
PMV BLD AUTO: 9.2 FL (ref 10.8–14.1)
POTASSIUM SERPL-SCNC: 3.7 MMOL/L (ref 3.5–5.1)
PROT SERPL-MCNC: 7.2 G/DL (ref 6.3–8.2)
RBC # BLD AUTO: 4.38 M/UL (ref 4.05–5.25)
SODIUM SERPL-SCNC: 141 MMOL/L (ref 136–145)
WBC # BLD AUTO: 11.1 K/UL (ref 4.3–11.1)

## 2017-07-15 PROCEDURE — 74011250636 HC RX REV CODE- 250/636

## 2017-07-15 PROCEDURE — 85025 COMPLETE CBC W/AUTO DIFF WBC: CPT | Performed by: INTERNAL MEDICINE

## 2017-07-15 PROCEDURE — 74011250636 HC RX REV CODE- 250/636: Performed by: HOSPITALIST

## 2017-07-15 PROCEDURE — 74011250636 HC RX REV CODE- 250/636: Performed by: INTERNAL MEDICINE

## 2017-07-15 PROCEDURE — 99218 HC RM OBSERVATION: CPT

## 2017-07-15 PROCEDURE — 82962 GLUCOSE BLOOD TEST: CPT

## 2017-07-15 PROCEDURE — 36415 COLL VENOUS BLD VENIPUNCTURE: CPT | Performed by: INTERNAL MEDICINE

## 2017-07-15 PROCEDURE — 99152 MOD SED SAME PHYS/QHP 5/>YRS: CPT | Performed by: INTERNAL MEDICINE

## 2017-07-15 PROCEDURE — 74011250637 HC RX REV CODE- 250/637: Performed by: HOSPITALIST

## 2017-07-15 PROCEDURE — 96376 TX/PRO/DX INJ SAME DRUG ADON: CPT

## 2017-07-15 PROCEDURE — 80053 COMPREHEN METABOLIC PANEL: CPT | Performed by: INTERNAL MEDICINE

## 2017-07-15 PROCEDURE — 76040000019: Performed by: INTERNAL MEDICINE

## 2017-07-15 PROCEDURE — 99153 MOD SED SAME PHYS/QHP EA: CPT | Performed by: INTERNAL MEDICINE

## 2017-07-15 RX ORDER — MIDAZOLAM HYDROCHLORIDE 1 MG/ML
.25-5 INJECTION, SOLUTION INTRAMUSCULAR; INTRAVENOUS
Status: DISCONTINUED | OUTPATIENT
Start: 2017-07-15 | End: 2017-07-15 | Stop reason: HOSPADM

## 2017-07-15 RX ORDER — SODIUM CHLORIDE 9 MG/ML
75 INJECTION, SOLUTION INTRAVENOUS CONTINUOUS
Status: DISCONTINUED | OUTPATIENT
Start: 2017-07-15 | End: 2017-07-15 | Stop reason: HOSPADM

## 2017-07-15 RX ORDER — FENTANYL CITRATE 50 UG/ML
50 INJECTION, SOLUTION INTRAMUSCULAR; INTRAVENOUS
Status: DISCONTINUED | OUTPATIENT
Start: 2017-07-15 | End: 2017-07-15 | Stop reason: HOSPADM

## 2017-07-15 RX ORDER — NALOXONE HYDROCHLORIDE 0.4 MG/ML
0.4 INJECTION, SOLUTION INTRAMUSCULAR; INTRAVENOUS; SUBCUTANEOUS
Status: DISCONTINUED | OUTPATIENT
Start: 2017-07-15 | End: 2017-07-15 | Stop reason: HOSPADM

## 2017-07-15 RX ORDER — PANTOPRAZOLE SODIUM 40 MG/1
40 TABLET, DELAYED RELEASE ORAL
Qty: 60 TAB | Refills: 0 | Status: SHIPPED | OUTPATIENT
Start: 2017-07-15 | End: 2017-07-26

## 2017-07-15 RX ORDER — FLUMAZENIL 0.1 MG/ML
0.2 INJECTION INTRAVENOUS
Status: DISCONTINUED | OUTPATIENT
Start: 2017-07-15 | End: 2017-07-15 | Stop reason: HOSPADM

## 2017-07-15 RX ORDER — SUCRALFATE 1 G/1
1 TABLET ORAL
Status: DISCONTINUED | OUTPATIENT
Start: 2017-07-15 | End: 2017-07-15 | Stop reason: HOSPADM

## 2017-07-15 RX ORDER — DIPHENHYDRAMINE HYDROCHLORIDE 50 MG/ML
50 INJECTION, SOLUTION INTRAMUSCULAR; INTRAVENOUS ONCE
Status: DISCONTINUED | OUTPATIENT
Start: 2017-07-15 | End: 2017-07-15 | Stop reason: HOSPADM

## 2017-07-15 RX ORDER — SUCRALFATE 1 G/10ML
1 SUSPENSION ORAL 4 TIMES DAILY
Qty: 414 ML | Refills: 1 | Status: SHIPPED | OUTPATIENT
Start: 2017-07-15 | End: 2017-07-26

## 2017-07-15 RX ORDER — PANTOPRAZOLE SODIUM 40 MG/1
40 TABLET, DELAYED RELEASE ORAL
Status: DISCONTINUED | OUTPATIENT
Start: 2017-07-15 | End: 2017-07-15 | Stop reason: HOSPADM

## 2017-07-15 RX ADMIN — SODIUM CHLORIDE 75 ML/HR: 900 INJECTION, SOLUTION INTRAVENOUS at 08:15

## 2017-07-15 RX ADMIN — MIDAZOLAM HYDROCHLORIDE 2 MG: 1 INJECTION, SOLUTION INTRAMUSCULAR; INTRAVENOUS at 08:19

## 2017-07-15 RX ADMIN — FENTANYL CITRATE 50 MCG: 50 INJECTION, SOLUTION INTRAMUSCULAR; INTRAVENOUS at 08:21

## 2017-07-15 RX ADMIN — MIDAZOLAM HYDROCHLORIDE 1 MG: 1 INJECTION, SOLUTION INTRAMUSCULAR; INTRAVENOUS at 08:33

## 2017-07-15 RX ADMIN — PAROXETINE HYDROCHLORIDE 20 MG: 20 TABLET, FILM COATED ORAL at 09:47

## 2017-07-15 RX ADMIN — HYDROCODONE BITARTRATE AND ACETAMINOPHEN 1 TABLET: 5; 325 TABLET ORAL at 10:27

## 2017-07-15 RX ADMIN — FENTANYL CITRATE 25 MCG: 50 INJECTION, SOLUTION INTRAMUSCULAR; INTRAVENOUS at 08:32

## 2017-07-15 RX ADMIN — ONDANSETRON 4 MG: 2 INJECTION INTRAMUSCULAR; INTRAVENOUS at 01:13

## 2017-07-15 RX ADMIN — OXYBUTYNIN CHLORIDE 5 MG: 5 TABLET ORAL at 09:47

## 2017-07-15 RX ADMIN — MIDAZOLAM HYDROCHLORIDE 2 MG: 1 INJECTION, SOLUTION INTRAMUSCULAR; INTRAVENOUS at 08:24

## 2017-07-15 RX ADMIN — Medication 5 ML: at 06:00

## 2017-07-15 RX ADMIN — FENTANYL CITRATE 25 MCG: 50 INJECTION, SOLUTION INTRAMUSCULAR; INTRAVENOUS at 08:28

## 2017-07-15 NOTE — DISCHARGE SUMMARY
Hospitalist Discharge Summary     Patient ID:  Joselo Dunham  [id number]  22 y.o.  1945  Admit date: 7/13/2017  6:54 PM  Discharge date and time: 7/15/2017  Attending: Osmin Velarde MD  PCP:  Kimberly Jacques MD  Treatment Team: Attending Provider: Osmin Velarde MD; Consulting Provider: Avery Valerio MD; Utilization Review: Isac Ayers RN    Principal Diagnosis Abdominal pain   Principal Problem:    Abdominal pain (7/13/2017)    Active Problems:    Diabetes mellitus ([address] (8/29/2013)      Obesity (8/29/2013)      Obstructive sleep apnea (8/29/2013)      Anemia (7/13/2017)           HPI:  'Ms. Rohan Rossi is a 66 yo F with PMHx of Morbid Obesity, LALA on CPAP and O2 supplementation , pulmonary HTN, HTN, DM who complains of increasing epigastric abdominal pain, N/V and inability to keep PO intake. Complains of burning epigastric abdominal pain with radiation on RUQ for last 6 months associated with soft stools and denis loss of approx 60 lbs. Pain is 8/10, sharp. Pain is getting worse with spicy food. PMHx of GERD and she had colonoscopy and EGD approx 5 years prior  Ans states that was WNL - no records to review. Sen also on 7/12 in ED and referred to outpatient GI, although her pain is increasing and she is unable to keep PO feeding and returned to ED. Placed on PPPI and Carafate w/o significant improvement. Denies SOB, CP or recent spikes of fever. H/H is 11.4/35.1 from 13.0/40/2 3 weeks prior.'    Hospital Course:  She was admitted and started on IV protonix. Hemoglobin remained stable. She had EGD on 7/15/17 showing no signs of bleeding. She states her pain and diarrhea are somewhat better. She is tolerating clear liquid diet. There is possibly PUD more distally in small bowel, and she is recommended to take BID protonix and carafate. She is to avoid NSAIDS and this was discussed with her. She is to follow up with GI.   On the day of discharge, she is deemed stable to return home.    Significant Diagnostic Studies:       Labs: Results:       Chemistry Recent Labs      07/15/17   0659  07/14/17 0415  07/13/17   1744   GLU  80  78  102*   NA  141  144  144   K  3.7  3.8  3.8   CL  102  109*  107   CO2  27  28  26   BUN  11  9  8   CREA  0.41*  0.37*  0.41*   CA  9.0  8.4  9.4   AGAP  12  7  11   AP  45*  40*  46*   TP  7.2  6.3  6.9   ALB  3.1*  2.7*  2.9*   GLOB  4.1*  3.6*  4.0*   AGRAT  0.8*  0.8*  0.7*      CBC w/Diff Recent Labs      07/15/17   0659  07/14/17   1622  07/14/17 0415 07/13/17   1744   WBC  11.1   --   11.6*   --   12.0*   RBC  4.38   --   3.87*   --   4.38   HGB  11.2*  10.4*  9.9*   < >  11.4*   HCT  35.9  33.1*  32.0*   < >  35.1*   PLT  377   --   319   --   374   GRANS  72   --    --    --   81*   LYMPH  18   --    --    --   11*   EOS  2   --    --    --   2    < > = values in this interval not displayed. Cardiac Enzymes No results for input(s): CPK, CKND1, LAURENCE in the last 72 hours. No lab exists for component: CKRMB, TROIP   Coagulation No results for input(s): PTP, INR, APTT in the last 72 hours. No lab exists for component: INREXT    Lipid Panel No results found for: CHOL, CHOLPOCT, CHOLX, CHLST, CHOLV, 546682, HDL, LDL, LDLC, DLDLP, 998036, VLDLC, VLDL, TGLX, TRIGL, TRIGP, TGLPOCT, CHHD, CHHDX   BNP No results for input(s): BNPP in the last 72 hours. Liver Enzymes Recent Labs      07/15/17   0659   TP  7.2   ALB  3.1*   AP  45*   SGOT  11*      Thyroid Studies No results found for: T4, T3U, TSH, TSHEXT         Discharge Exam:  Visit Vitals    /65    Pulse (!) 55    Temp 98 °F (36.7 °C)    Resp 12    Ht 5' 6\" (1.676 m)    Wt 108 kg (238 lb)    SpO2 92%    BMI 38.41 kg/m2     General appearance: alert, cooperative, no distress, appears stated age, obese  Lungs: clear to auscultation bilaterally  Heart: regular rate and rhythm, S1, S2 normal, no murmur, click, rub or gallop  Abdomen: soft, non-tender.  Bowel sounds normal. No masses,  no organomegaly  Extremities: no cyanosis or edema  Neurologic: Grossly normal    Disposition: home  Discharge Condition: stable  Patient Instructions:   Current Discharge Medication List      START taking these medications    Details   pantoprazole (PROTONIX) 40 mg tablet Take 1 Tab by mouth Before breakfast and dinner. Indications: Suspected small bowel ulcer  Qty: 60 Tab, Refills: 0         CONTINUE these medications which have CHANGED    Details   sucralfate (CARAFATE) 100 mg/mL suspension Take 10 mL by mouth four (4) times daily. Qty: 414 mL, Refills: 1         CONTINUE these medications which have NOT CHANGED    Details   oxyCODONE IR (ROXICODONE) 10 mg tab immediate release tablet Take 10 mg by mouth four (4) times daily. canagliflozin (INVOKANA) 300 mg tablet Take 300 mg by mouth daily. levothyroxine (SYNTHROID) 50 mcg tablet Take 50 mcg by mouth Daily (before breakfast). losartan (COZAAR) 100 mg tablet Take 100 mg by mouth daily. nadolol (CORGARD) 80 mg tablet Take 80 mg by mouth daily. metFORMIN (GLUCOPHAGE) 500 mg tablet Take 1,000 mg by mouth two (2) times daily (with meals). PARoxetine (PAXIL) 20 mg tablet Take 20 mg by mouth daily. pioglitazone (ACTOS) 30 mg tablet Take  by mouth daily. oxybutynin (DITROPAN) 5 mg tablet Take 5 mg by mouth two (2) times a day. lovastatin (MEVACOR) 40 mg tablet Take 40 mg by mouth nightly. zolpidem (AMBIEN) 10 mg tablet Take  by mouth nightly as needed. OXYGEN-AIR DELIVERY SYSTEMS by Does Not Apply route. 4 lpm bled into cpap      cpap machine kit by Does Not Apply route. 10 cm with O2 @@ 4 lpm         STOP taking these medications       omeprazole (PRILOSEC) 20 mg capsule Comments:   Reason for Stopping:               Activity: Activity as tolerated  Diet: Resume previous diet      Follow-up  ·   [facility] (office will call you for appointment).   Time spent to discharge patient 25 minutes  Signed:  Andie Em.  Jorge L Rajput MD  7/15/2017  11:41 AM

## 2017-07-15 NOTE — PROGRESS NOTES
TRANSFER - IN REPORT:    Verbal report received from QuanRN(name) on Denise Hay  being received from 611(unit) for ordered procedure      Report consisted of patients Situation, Background, Assessment and   Recommendations(SBAR). Information from the following report(s) SBAR was reviewed with the receiving nurse. Opportunity for questions and clarification was provided.

## 2017-07-15 NOTE — PROGRESS NOTES
Pt discharged with personal belongings and family. IV taken out, pt tolerated well. Pt wheeled down in wheelchair with CNA.

## 2017-07-15 NOTE — PROGRESS NOTES
Notified Nicole Perez from lab of need for hgb now; orders for Q6 HGb; last Hgb at 2245. Stated \"I'll see what I can do. \"

## 2017-07-15 NOTE — PROCEDURES
DATE OF PROCEDURE: 7/15/17    PROCEDURE: Esophagogastroduodenoscopy. ENDOSCOPIST: Gasper Carpenter M.D. PREOPERATIVE DIAGNOSIS: epigastric pain, anemia    POSTOPERATIVE DIAGNOSIS: hiatal hernia    INSTRUMENTS: [id number]    SEDATION: MAC    DESCRIPTION: After informed consent was obtained, the patient was taken to the endoscopy suite and placed in the left lateral decubitus position. Intravenous sedation was administered as above and posterior pharynx was anesthetized with local anesthetic spray. After adequate sedation had been achieved the endoscope was inserted over the tongue and through the posterior pharynx under direct visualization down the esophagus to the stomach and into the second portion of the duodenum. The endoscopic was withdrawn from that point, performing a careful survey of the mucosa. Retroflexion was performed in the gastric fundus. FINDINGS:  Esophagus: Normal    Stomach: Small hiatal hernia without Stu ulcers, otherwise normal.    Duodenum: Normal    Estimated blood loss:  0 cc-minimal    IMPRESSION: No source for pain or anemia. Still concerned about NSAID-induced ulcer of small bowel. No lower GI symptoms. PLAN: Continue Protonix. Add Carafate. Clear liquid diet.       Sedrick Rowley MD

## 2017-07-15 NOTE — PROGRESS NOTES
TRANSFER - OUT REPORT:    Verbal report given to Arlene Victoria RN (name) on Denise May  being transferred to room 611 (unit) for routine progression of care. Report consisted of patients Situation, Background, Assessment and   Recommendations(SBAR). Information from the following report(s) Procedure Summary was reviewed with the receiving nurse. Lines:   Peripheral IV  Right Arm (Active)   Site Assessment Clean, dry, & intact 7/15/2017  8:09 AM   Phlebitis Assessment 0 7/15/2017  8:09 AM   Infiltration Assessment 0 7/15/2017  8:09 AM   Dressing Status Clean, dry, & intact; Occlusive 7/15/2017  8:09 AM   Dressing Type Tape;Transparent 7/15/2017  8:09 AM   Hub Color/Line Status Pink; Infusing;Flushed 7/15/2017  8:09 AM        Opportunity for questions and clarification was provided.       Patient transported with:   O2 @ 4 liters

## 2017-07-15 NOTE — INTERVAL H&P NOTE
H&P Update: Sridevi Skelton was seen and examined. History and physical has been reviewed. The patient has been examined. There have been no significant clinical changes since the completion of the originally dated History and Physical.  Patient identified by surgeon; surgical site was confirmed by patient and surgeon.     Signed By: Beth Cha MD     July 15, 2017 8:19 AM

## 2017-07-15 NOTE — PROGRESS NOTES
Pt SAT sustaining 80's despite elevated in bed; instructed on deep breathing. Pt placed on 2L O2; SAT 95. Pt states \"I feel like I have COPD but it has never been diagnosed\"; also states \"I used to wear oxygen at home in the past but stopped because I was getting bad headaches. \"

## 2017-07-15 NOTE — DISCHARGE INSTRUCTIONS
Stop taking prilosec (omeprazole). Start taking Protonix (pantoprazole) 40 mg twice a day for a month or until you see gastroenterologist.  Continue sucralafate. Do not take any more NSAIDS (advil, ibuprofen, aleve, goody's, etc). Only take your oxycodone and tylenol for pain. DISCHARGE SUMMARY from Nurse    The following personal items are in your possession at time of discharge:    Dental Appliances: None  Visual Aid: Glasses, With patient     Home Medications: None  Jewelry: None  Clothing: At bedside  Other Valuables: Cell Phone             PATIENT INSTRUCTIONS:    After general anesthesia or intravenous sedation, for 24 hours or while taking prescription Narcotics:  · Limit your activities  · Do not drive and operate hazardous machinery  · Do not make important personal or business decisions  · Do  not drink alcoholic beverages  · If you have not urinated within 8 hours after discharge, please contact your surgeon on call. Report the following to your surgeon:  · Excessive pain, swelling, redness or odor of or around the surgical area  · Temperature over 100.5  · Nausea and vomiting lasting longer than 4 hours or if unable to take medications  · Any signs of decreased circulation or nerve impairment to extremity: change in color, persistent  numbness, tingling, coldness or increase pain  · Any questions        What to do at Home:  Recommended activity: Activity as tolerated and No lifting, Driving, or Strenuous exercise for today    If you experience any of the following symptoms excessive nausea and vomiting, fever greater than 100.5, any bleeding, please follow up with primary health care provider and if emergency call 911 and report to emergency room. *  Please give a list of your current medications to your Primary Care Provider.     *  Please update this list whenever your medications are discontinued, doses are      changed, or new medications (including over-the-counter products) are added.    *  Please carry medication information at all times in case of emergency situations. These are general instructions for a healthy lifestyle:    No smoking/ No tobacco products/ Avoid exposure to second hand smoke    Surgeon General's Warning:  Quitting smoking now greatly reduces serious risk to your health. Obesity, smoking, and sedentary lifestyle greatly increases your risk for illness    A healthy diet, regular physical exercise & weight monitoring are important for maintaining a healthy lifestyle    You may be retaining fluid if you have a history of heart failure or if you experience any of the following symptoms:  Weight gain of 3 pounds or more overnight or 5 pounds in a week, increased swelling in our hands or feet or shortness of breath while lying flat in bed. Please call your doctor as soon as you notice any of these symptoms; do not wait until your next office visit. Recognize signs and symptoms of STROKE:    F-face looks uneven    A-arms unable to move or move unevenly    S-speech slurred or non-existent    T-time-call 911 as soon as signs and symptoms begin-DO NOT go       Back to bed or wait to see if you get better-TIME IS BRAIN. Warning Signs of HEART ATTACK     Call 911 if you have these symptoms:   Chest discomfort. Most heart attacks involve discomfort in the center of the chest that lasts more than a few minutes, or that goes away and comes back. It can feel like uncomfortable pressure, squeezing, fullness, or pain.  Discomfort in other areas of the upper body. Symptoms can include pain or discomfort in one or both arms, the back, neck, jaw, or stomach.  Shortness of breath with or without chest discomfort.  Other signs may include breaking out in a cold sweat, nausea, or lightheadedness. Don't wait more than five minutes to call 911 - MINUTES MATTER! Fast action can save your life. Calling 911 is almost always the fastest way to get lifesaving treatment. Emergency Medical Services staff can begin treatment when they arrive -- up to an hour sooner than if someone gets to the hospital by car. The discharge information has been reviewed with the patient. The patient verbalized understanding. Discharge medications reviewed with the patient     Upper GI Endoscopy: What to Expect at [address] [place] [address]  After you have an endoscopy, you will stay at the hospital or clinic for 1 to 2 hours. This will allow the medicine to wear off. You will be able to go home after your doctor or nurse checks to make sure you are not having any problems. You may have to stay overnight if you had treatment during the test. You may have a sore throat for a day or two after the test.  This care sheet gives you a general idea about what to expect after the test.  How can you care for yourself at home? Activity  Rest as much as you need to after you go home. You should be able to go back to your usual activities the day after the test.  Diet  Follow your doctor's directions for eating after the test.  Drink plenty of fluids (unless your doctor has told you not to). Medications  If you have a sore throat the day after the test, use an over-the-counter spray to numb your throat. Follow-up care is a key part of your treatment and safety. Be sure to make and go to all appointments, and call your doctor if you are having problems. It's also a good idea to know your test results and keep a list of the medicines you take. When should you call for help? Call 911 anytime you think you may need emergency care. For example, call if:  You passed out (lost consciousness). You cough up blood. You vomit blood or what looks like coffee grounds. You pass maroon or very bloody stools. Call your doctor now or seek immediate medical care if:  You have trouble swallowing. You have belly pain. Your stools are black and tarlike or have streaks of blood.   You are sick to your stomach or cannot keep fluids down. Watch closely for changes in your health, and be sure to contact your doctor if:  Your throat still hurts after a day or two. You do not get better as expected. Where can you learn more? Go to http://philip-gloria.info/. Enter (94) 563-734 in the search box to learn more about \"Upper GI Endoscopy: What to Expect at Home. \"  Current as of: August 9, 2016  Content Version: 11.3  © 4341-6697 [employer]. Care instructions adapted under license by [employer] (which disclaims liability or warranty for this information). If you have questions about a medical condition or this instruction, always ask your healthcare professional. [place] [address] any warranty or liability for your use of this information. Nausea and Vomiting: Care Instructions  Your Care Instructions    When you are nauseated, you may feel weak and sweaty and notice a lot of saliva in your mouth. Nausea often leads to vomiting. Most of the time you do not need to worry about nausea and vomiting, but they can be signs of other illnesses. Two common causes of nausea and vomiting are stomach flu and food poisoning. Nausea and vomiting from viral stomach flu will usually start to improve within 24 hours. Nausea and vomiting from food poisoning may last from 12 to 48 hours. The doctor has checked you carefully, but problems can develop later. If you notice any problems or new symptoms, get medical treatment right away. Follow-up care is a key part of your treatment and safety. Be sure to make and go to all appointments, and call your doctor if you are having problems. It's also a good idea to know your test results and keep a list of the medicines you take. How can you care for yourself at home? · To prevent dehydration, drink plenty of fluids, enough so that your urine is light yellow or clear like water.  Choose water and other caffeine-free clear liquids until you feel better. If you have kidney, heart, or liver disease and have to limit fluids, talk with your doctor before you increase the amount of fluids you drink. · Rest in bed until you feel better. · When you are able to eat, try clear soups, mild foods, and liquids until all symptoms are gone for 12 to 48 hours. Other good choices include dry toast, crackers, cooked cereal, and gelatin dessert, such as Jell-O. When should you call for help? Call 911 anytime you think you may need emergency care. For example, call if:  · You passed out (lost consciousness). Call your doctor now or seek immediate medical care if:  · You have symptoms of dehydration, such as:  ¨ Dry eyes and a dry mouth. ¨ Passing only a little dark urine. ¨ Feeling thirstier than usual.  · You have new or worsening belly pain. · You have a new or higher fever. · You vomit blood or what looks like coffee grounds. Watch closely for changes in your health, and be sure to contact your doctor if:  · You have ongoing nausea and vomiting. · Your vomiting is getting worse. · Your vomiting lasts longer than 2 days. · You are not getting better as expected. Where can you learn more? Go to http://philip-gloria.info/. Enter 25 [id number] in the search box to learn more about \"Nausea and Vomiting: Care Instructions. \"  Current as of: March 20, 2017  Content Version: 11.3  © 3836-7348 [employer]. Care instructions adapted under license by [employer] (which disclaims liability or warranty for this information). If you have questions about a medical condition or this instruction, always ask your healthcare professional. [address] any warranty or liability for your use of this information. and appropriate educational materials and side effects teaching were provided.

## 2017-07-15 NOTE — H&P (VIEW-ONLY)
For cough and GERD    [facility] Consult Note       Primary GI Physician:  Dr Brenda Christianson    Referring Physician:  Dr Keihsa Cristina    Consult Date:  7/14/2017    Admit Date:  7/13/2017    Chief Complaint:  ABD Pain    Subjective:     History of Present Illness:  Patient is a 67 y.o. female with PMH of DM, HTN and pulmonary HTN, who is seen in consultation at the request of Dr. Navdeep Thomas for abd pain. She reports several months of generalized abd pain described as crampy in her mid abd around her umbilical area and radiating right and left. She also reports epigastric pain. She reports nausea and vomiting of bilious material associated with abd pain. She denies hematemesis or coffee ground emesis. She reports frequent Aleve use for back pain. Saw pain mgt and has back injection pending. Takes Prilosec 20 mg daily for GERD. Reports worsening GERD symptoms in the last few months. Also reports 60 # weight loss over this time. She denies ETOH use. She presented to the ED 6/22 with abd pain and was dx w CT confirmed diverticulitis for which she was treated with Cipro and Flagyl. She reports finishing the entire antibiotic course with minimal improvement in abd pain. She reports pasty stools up to 4 times daily and feels they have had more form since antibiotic therapy. She denies rectal bleed. She underwent EGD and colo in 2009 for GERD, cough and a history of colon polyps. EGD with HP gastric polyps only. [place] with TA colon polyps x2 and a recommended 3 year recall. She denies CP or SOB. She has a hx of pulmonary HTN. She is not on home O2. She is on CPAP at . She last saw pulmonology about 3 years ago. She reports off and on subjective fever. LFTs and lipase were normal yesterday on arrival at 2 pm.  EKG in ED with NSR.  ECHO in 2014 w EF 60-65%. CT today with diverticulosis and moderatly prominent stool.      CT ABD/P 6/22/17  IMPRESSION: Mild proximal sigmoid acute diverticulitis. 2. Tiny focus of urinary bladder luminal gas. Correlate for recent  instrumentation. Fistula or airforming infection can also cause this. CT ABD/P 7/14/17  FINDINGS: The partially seen lung bases are clear.   Abdomen: There are no suspicious lesions in the liver or spleen. The adrenal  glands, gallbladder and pancreas appear unremarkable. There is normal  enhancement of the kidneys, no hydronephrosis.     No lymphadenopathy, free air, focal inflammatory changes or fluid collections in  the abdomen. Aorta normal caliber with scattered atherosclerotic change again  noted. Major vessels do contain contrast. Right lower quadrant appendix is  unremarkable.  Margaretha Jews is no evidence of bowel obstruction. Moderately prominent stool throughout  with diverticulosis, but no definite diverticulitis. There is only partial or  contrast opacification of GI tract which limits detail.   Pelvis:  No acute inflammatory changes or fluid collections in the pelvis. No  lymphadenopathy. Unremarkable urinary bladder which is decompressed. Ovaries are  not enlarged. Small calcifications in the uterus may indicate degenerated  fibroids.   Bones: No evidence of a displaced fracture or dislocation. Low bone density. Multilevel mild degenerative changes of spine. IMPRESSION:   1. Diverticulosis. 2. No bowel obstruction, abscess, or acute abnormality otherwise. EGD and colo on 8/20/09 Dr Maki Lat  EGD for cough and GERD with HP gastric polyps.   COLO for a personal history of colo plops with diverticulosis and descendiing and cecal TA colon polyp       PMH:  Past Medical History:   Diagnosis Date    Diabetes ([facility] [address]     Hypertension     Pulmonary nodule     Completed surveillance in 2011       [address]:  Past Surgical History:   Procedure Laterality Date    HX CARPAL TUNNEL RELEASE  1988    HX KNEE ARTHROSCOPY  2008    Left knee partial replacement    HX SHOULDER ARTHROSCOPY  1989    Rotator cuff       Allergies:  No Known Allergies    Home Medications:  Prior to Admission medications    Medication Sig Start Date End Date Taking? Authorizing Provider   sucralfate (CARAFATE) 100 mg/mL suspension Take 10 mL by mouth four (4) times daily. 6/22/17   Graciela Luu MD   glipiZIDE (GLUCOTROL) 5 mg tablet Take  by mouth two (2) times a day. 2 tabs po bid    Historical Provider   metFORMIN (GLUCOPHAGE) 500 mg tablet Take  by mouth two (2) times daily (with meals). Historical Provider   PARoxetine (PAXIL) 20 mg tablet Take 20 mg by mouth daily. Historical Provider   pioglitazone (ACTOS) 30 mg tablet Take  by mouth daily. Historical Provider   nadolol (CORGARD) 40 mg tablet Take  by mouth daily. Historical Provider   omeprazole (PRILOSEC) 20 mg capsule Take 20 mg by mouth daily. Historical Provider   oxybutynin (DITROPAN) 5 mg tablet Take 5 mg by mouth three (3) times daily. Historical Provider   losartan (COZAAR) 50 mg tablet Take  by mouth daily. Historical Provider   lovastatin (MEVACOR) 40 mg tablet Take 40 mg by mouth nightly. Historical Provider   zolpidem (AMBIEN) 10 mg tablet Take  by mouth nightly as needed. Historical Provider   OXYGEN-AIR DELIVERY SYSTEMS by Does Not Apply route. 4 lpm bled into cpap    Historical Provider   cpap machine kit by Does Not Apply route.  10 cm with O2 @@ 4 lpm    Historical Provider       Hospital Medications:  Current Facility-Administered Medications   Medication Dose Route Frequency    simvastatin (ZOCOR) tablet 20 mg  20 mg Oral QHS    nadolol (CORGARD) tablet 40 mg  40 mg Oral DAILY    oxybutynin (DITROPAN) tablet 5 mg  5 mg Oral TID    PARoxetine (PAXIL) tablet 20 mg  20 mg Oral DAILY    zolpidem (AMBIEN) tablet 5 mg  5 mg Oral QHS PRN    sodium chloride (NS) flush 5-10 mL  5-10 mL IntraVENous Q8H    sodium chloride (NS) flush 5-10 mL  5-10 mL IntraVENous PRN    acetaminophen (TYLENOL) tablet 650 mg  650 mg Oral Q4H PRN    HYDROcodone-acetaminophen (NORCO) 5-325 mg per tablet 1 Tab  1 Tab Oral Q4H PRN    morphine injection 2 mg  2 mg IntraVENous Q3H PRN    pantoprazole (PROTONIX) 40 mg in sodium chloride 0.9 % 10 mL injection  40 mg IntraVENous Q12H    hydrALAZINE (APRESOLINE) 20 mg/mL injection 10 mg  10 mg IntraVENous Q6H PRN     Current Outpatient Prescriptions   Medication Sig    sucralfate (CARAFATE) 100 mg/mL suspension Take 10 mL by mouth four (4) times daily.  glipiZIDE (GLUCOTROL) 5 mg tablet Take  by mouth two (2) times a day. 2 tabs po bid    metFORMIN (GLUCOPHAGE) 500 mg tablet Take  by mouth two (2) times daily (with meals).  PARoxetine (PAXIL) 20 mg tablet Take 20 mg by mouth daily.  pioglitazone (ACTOS) 30 mg tablet Take  by mouth daily.  nadolol (CORGARD) 40 mg tablet Take  by mouth daily.  omeprazole (PRILOSEC) 20 mg capsule Take 20 mg by mouth daily.  oxybutynin (DITROPAN) 5 mg tablet Take 5 mg by mouth three (3) times daily.  losartan (COZAAR) 50 mg tablet Take  by mouth daily.  lovastatin (MEVACOR) 40 mg tablet Take 40 mg by mouth nightly.  zolpidem (AMBIEN) 10 mg tablet Take  by mouth nightly as needed.  OXYGEN-AIR DELIVERY SYSTEMS by Does Not Apply route. 4 lpm bled into cpap    cpap machine kit by Does Not Apply route. 10 cm with O2 @@ 4 lpm       Social History:  Social History   Substance Use Topics    Smoking status: Never Smoker    Smokeless tobacco: Never Used    Alcohol use No       Pt denies any history of drug use, blood transfusions, or tattoos. Family History:  History reviewed. No pertinent family history. Review of Systems:  A detailed 10 system ROS is obtained, with pertinent positives as listed above. All others are negative.     Diet:  NPO    Objective:     Physical Exam:  Vitals:  Visit Vitals    /59    Pulse 66    Temp 97.8 °F (36.6 °C)    Resp 18    Ht 5' 6\" (1.676 m)    Wt 108 kg (238 lb)    BMI 38.41 kg/m2     Gen:  Pt is alert, cooperative, no acute distress, obese  Skin:  Extremities and face reveal no rashes. HEENT: Sclerae anicteric. Extra-occular muscles are intact. No oral ulcers. No abnormal pigmentation of the lips. The neck is supple. Cardiovascular: Regular rate and rhythm. No murmurs, gallops, or rubs. Respiratory:  Comfortable breathing with no accessory muscle use. Clear breath sounds anteriorly with no wheezes, rales, or rhonchi. GI:  Abdomen nondistended, soft. Mod diffusely ttp over entire abd and epigastrium. Normal active bowel sounds. No enlargement of the liver or spleen. No masses palpable. Rectal:  Deferred  Musculoskeletal:  No pitting edema of the lower legs. Neurological:  Gross memory appears intact. Patient is alert and oriented. Psychiatric:  Mood appears appropriate with judgement intact. Lymphatic:  No cervical or supraclavicular adenopathy. Laboratory:    Recent Labs      07/14/17   0415  07/14/17   0010  07/13/17   1744   WBC  11.6*   --   12.0*   HGB  9.9*  10.3*  11.4*   HCT  32.0*  33.3*  35.1*   PLT  319   --   374   MCV  82.7   --   80.1   NA  144   --   144   K  3.8   --   3.8   CL  109*   --   107   CO2  28   --   26   BUN  9   --   8   CREA  0.37*   --   0.41*   CA  8.4   --   9.4   GLU  78   --   102*   AP  40*   --   46*   SGOT  13*   --   15   ALT  11*   --   12   TBILI  0.3   --   0.4   ALB  2.7*   --   2.9*   TP  6.3   --   6.9   LPSE   --    --   113          Assessment:     Principal Problem:    Abdominal pain (7/13/2017)    Active Problems:    Diabetes mellitus ([facility] [address] (8/29/2013)      Obesity (8/29/2013)      Obstructive sleep apnea (8/29/2013)      Anemia (7/13/2017)      67 y.o. female with PMH of DM, HTN and pulmonary HTN (no O2 but CPAP at hs) admitted with epigastric abd pain,generalized abd pain, nausea and vomiting. She has a hx of GERD and takes Prilosec 20 mg daily. Has been taking Aleve daily. Denies melena, BRRB. Hgb has decreased to 9.9 (from 13 on 6/22).   She was treated for diverticulitis on 6/22. She reports N/V with food intake, can keep fluids down. Denies CP or SOB. Ct today w/o acute findings, moderate stool and diverticulosis. EGD in 2009 with HP gastric polyps. [place] in 2009 w TA x2.  Epigastric pain, N/V could be caused by PUD or biliary issues. LFTs and Lipase normal yesterday. No S/SX GIB. 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 Plan:     1) Increase PPI to BID  2) Patient will need non urgent EGD unless sx of GIB  3) US to evalaute biliary tract  4) Monitor H&H and for s/sx GIB  5) Patient will need a colonoscopy at some point, which can be an out patient when inflammation from diverticulitis is completely resolved   6) NPO   7) Consider fiber therapy for [facility] when taking PO, also could consider antispasmodic     Gavi Sports, NP  Patient is seen and examined in collaboration with Dr. Jose Guadalupe Ramírez. Assessment and plan as per Dr. Bernadine Brito. I have seen and examined the patient, and I have directed and agreed to the plan as described. CT and labs are negative. Hgb is low. She has frequent NSAID use. U/S is negative for cholecystitis. EGD is indicated to evaluate for PUD. Technique, benefits, and risks discussed. This will be tomorrow.     Phani Tapia MD

## 2017-07-26 ENCOUNTER — ANESTHESIA EVENT (OUTPATIENT)
Dept: ENDOSCOPY | Age: 72
End: 2017-07-26
Payer: MEDICARE

## 2017-07-27 ENCOUNTER — ANESTHESIA (OUTPATIENT)
Dept: ENDOSCOPY | Age: 72
End: 2017-07-27
Payer: MEDICARE

## 2017-07-27 ENCOUNTER — HOSPITAL ENCOUNTER (OUTPATIENT)
Age: 72
Setting detail: OUTPATIENT SURGERY
Discharge: HOME | End: 2017-07-27
Attending: INTERNAL MEDICINE | Admitting: INTERNAL MEDICINE
Payer: MEDICARE

## 2017-07-27 VITALS
HEIGHT: 65 IN | SYSTOLIC BLOOD PRESSURE: 168 MMHG | RESPIRATION RATE: 16 BRPM | TEMPERATURE: 96.8 F | DIASTOLIC BLOOD PRESSURE: 80 MMHG | OXYGEN SATURATION: 92 % | BODY MASS INDEX: 41.65 KG/M2 | HEART RATE: 57 BPM | WEIGHT: 250 LBS

## 2017-07-27 LAB — GLUCOSE BLD STRIP.AUTO-MCNC: 134 MG/DL (ref 65–100)

## 2017-07-27 PROCEDURE — 82962 GLUCOSE BLOOD TEST: CPT

## 2017-07-27 PROCEDURE — 74011250636 HC RX REV CODE- 250/636: Performed by: ANESTHESIOLOGY

## 2017-07-27 PROCEDURE — 76040000025: Performed by: INTERNAL MEDICINE

## 2017-07-27 PROCEDURE — 76060000032 HC ANESTHESIA 0.5 TO 1 HR: Performed by: INTERNAL MEDICINE

## 2017-07-27 PROCEDURE — 74011250637 HC RX REV CODE- 250/637: Performed by: INTERNAL MEDICINE

## 2017-07-27 PROCEDURE — 74011250636 HC RX REV CODE- 250/636

## 2017-07-27 PROCEDURE — 74011000250 HC RX REV CODE- 250

## 2017-07-27 RX ORDER — SODIUM CHLORIDE, SODIUM LACTATE, POTASSIUM CHLORIDE, CALCIUM CHLORIDE 600; 310; 30; 20 MG/100ML; MG/100ML; MG/100ML; MG/100ML
25 INJECTION, SOLUTION INTRAVENOUS CONTINUOUS
Status: DISCONTINUED | OUTPATIENT
Start: 2017-07-27 | End: 2017-07-27 | Stop reason: HOSPADM

## 2017-07-27 RX ORDER — SODIUM CHLORIDE 0.9 % (FLUSH) 0.9 %
5-10 SYRINGE (ML) INJECTION AS NEEDED
Status: DISCONTINUED | OUTPATIENT
Start: 2017-07-27 | End: 2017-07-27 | Stop reason: HOSPADM

## 2017-07-27 RX ORDER — NADOLOL 40 MG/1
80 TABLET ORAL DAILY
Status: COMPLETED | OUTPATIENT
Start: 2017-07-27 | End: 2017-07-27

## 2017-07-27 RX ORDER — FAMOTIDINE 20 MG/1
20 TABLET, FILM COATED ORAL
Status: DISCONTINUED | OUTPATIENT
Start: 2017-07-27 | End: 2017-07-27 | Stop reason: HOSPADM

## 2017-07-27 RX ORDER — PROPOFOL 10 MG/ML
INJECTION, EMULSION INTRAVENOUS AS NEEDED
Status: DISCONTINUED | OUTPATIENT
Start: 2017-07-27 | End: 2017-07-27 | Stop reason: HOSPADM

## 2017-07-27 RX ORDER — LIDOCAINE HYDROCHLORIDE 20 MG/ML
JELLY TOPICAL AS NEEDED
Status: DISCONTINUED | OUTPATIENT
Start: 2017-07-27 | End: 2017-07-27 | Stop reason: HOSPADM

## 2017-07-27 RX ORDER — SODIUM CHLORIDE, SODIUM LACTATE, POTASSIUM CHLORIDE, CALCIUM CHLORIDE 600; 310; 30; 20 MG/100ML; MG/100ML; MG/100ML; MG/100ML
100 INJECTION, SOLUTION INTRAVENOUS CONTINUOUS
Status: DISCONTINUED | OUTPATIENT
Start: 2017-07-27 | End: 2017-07-27 | Stop reason: HOSPADM

## 2017-07-27 RX ORDER — ONDANSETRON 2 MG/ML
INJECTION INTRAMUSCULAR; INTRAVENOUS
Status: COMPLETED
Start: 2017-07-27 | End: 2017-07-27

## 2017-07-27 RX ORDER — PROPOFOL 10 MG/ML
INJECTION, EMULSION INTRAVENOUS
Status: DISCONTINUED | OUTPATIENT
Start: 2017-07-27 | End: 2017-07-27 | Stop reason: HOSPADM

## 2017-07-27 RX ORDER — LIDOCAINE HYDROCHLORIDE 20 MG/ML
INJECTION, SOLUTION EPIDURAL; INFILTRATION; INTRACAUDAL; PERINEURAL AS NEEDED
Status: DISCONTINUED | OUTPATIENT
Start: 2017-07-27 | End: 2017-07-27 | Stop reason: HOSPADM

## 2017-07-27 RX ADMIN — PROPOFOL 60 MG: 10 INJECTION, EMULSION INTRAVENOUS at 07:15

## 2017-07-27 RX ADMIN — LIDOCAINE HYDROCHLORIDE 3 ML: 20 JELLY TOPICAL at 07:17

## 2017-07-27 RX ADMIN — SODIUM CHLORIDE, SODIUM LACTATE, POTASSIUM CHLORIDE, AND CALCIUM CHLORIDE: 600; 310; 30; 20 INJECTION, SOLUTION INTRAVENOUS at 07:09

## 2017-07-27 RX ADMIN — ONDANSETRON 4 MG: 2 INJECTION INTRAMUSCULAR; INTRAVENOUS at 07:01

## 2017-07-27 RX ADMIN — NADOLOL 80 MG: 40 TABLET ORAL at 07:00

## 2017-07-27 RX ADMIN — SODIUM CHLORIDE, SODIUM LACTATE, POTASSIUM CHLORIDE, AND CALCIUM CHLORIDE 25 ML/HR: 600; 310; 30; 20 INJECTION, SOLUTION INTRAVENOUS at 07:01

## 2017-07-27 RX ADMIN — PROPOFOL 75 MCG/KG/MIN: 10 INJECTION, EMULSION INTRAVENOUS at 07:15

## 2017-07-27 RX ADMIN — LIDOCAINE HYDROCHLORIDE 20 MG: 20 INJECTION, SOLUTION EPIDURAL; INFILTRATION; INTRACAUDAL; PERINEURAL at 07:15

## 2017-07-27 NOTE — H&P
[facility] Consult Note           Referring Physician:     Consult Date: 7/27/2017    Reason for Consult: PMH polyps, PMH diverticulitis    History of Present Illness:  Patient is a 67 y.o. female who is seen in consultation for PMH polyps, PMH diverticulitis. Past Medical History:   Diagnosis Date    Anxiety     Diabetes mellitus, type 2 (HCC)     oral agents: avg. 120: pt denies hypo episodes: A1c unknown    GERD (gastroesophageal reflux disease)     not currently medicated    Hypertension     LALA on CPAP     Wears CPAP at Night    PONV (postoperative nausea and vomiting)     Pulmonary nodule     stable, no longer monitored      Past Surgical History:   Procedure Laterality Date    HX CARPAL TUNNEL RELEASE  1988    HX KNEE ARTHROSCOPY  2008    Left knee partial replacement    HX SHOULDER ARTHROSCOPY  1989    Rotator cuff      Family History   Problem Relation Age of Onset    Cancer Mother      endometrial     Social History     Occupational History    Not on file. Social History Main Topics    Smoking status: Never Smoker    Smokeless tobacco: Never Used    Alcohol use No    Drug use: No    Sexual activity: Not on file       Hospital Medications:  Current Facility-Administered Medications   Medication Dose Route Frequency    lactated Ringers infusion  100 mL/hr IntraVENous CONTINUOUS    famotidine (PEPCID) tablet 20 mg  20 mg Oral ON CALL    lactated Ringers infusion  25 mL/hr IntraVENous CONTINUOUS    sodium chloride (NS) flush 5-10 mL  5-10 mL IntraVENous PRN    famotidine (PF) (PEPCID) 20 mg in sodium chloride 0.9 % 10 mL injection  20 mg IntraVENous ONCE PRN       Allergies:  No Known Allergies    Review of Systems:  A comprehensive review of systems was negative except for that written in the History of Present Illness.     Objective:     Physical Exam:  Vitals:  Visit Vitals    BP (!) 233/105    Pulse (!) 54    Temp 99.1 °F (37.3 °C)    Ht 5' 5\" (1.651 m)    Wt 113.4 kg (250 lb)    SpO2 98%    Breastfeeding No    BMI 41.6 kg/m2       General: No acute distress. Skin:  Extremities and face reveal no rashes. No quispe erythema. No telangiectasias on the chest wall. HEENT: Sclerae anicteric. No oral ulcers. No abnormal pigmentation of the lips. The neck is supple. Cardiovascular: Regular rate and rhythm. No murmurs, gallops, or rubs. Respiratory:  Comfortable breathing  With no accessory muscle use. Clear breath sounds with no wheezes, rales, or rhonchi. GI:  Abdomen nondistended, soft, and nontender. Normal active bowel sounds. No enlargement of the liver or spleen. No masses palpable. Musculoskeletal:  No pitting edema of the lower legs. Extremities have good range of motion. Neurological:  Gross memory appears intact. Patient is alert and oriented. Psychiatric:  Mood appears appropriate with judgement intact. Lymphatic:  No cervical or supraclavicular adenopathy. Laboratory:    No results for input(s): WBC, RBC, HGB, HCT, PLT, HGBEXT, HCTEXT, PLTEXT in the last 72 hours. No results for input(s): GLU, NA, K, CL, CO2, BUN, CREA, CA in the last 72 hours. No results for input(s): PTP, INR, APTT in the last 72 hours. No lab exists for component: INREXT  No results for input(s): TBIL, CBIL, SGOT, GPT, AP, ALB, TP, AML, LPSE in the last 72 hours.     Assessment:       A 67 y.o. female with PMH polyps, PMH diverticulitis      Plan:       Colonoscopy    Signed By: Marcos Sanchez MD     July 27, 2017

## 2017-07-27 NOTE — PROCEDURES
Colonoscopy Procedure Note    Indications: PMH polyps, PMH diverticulitis    Anesthesia/Sedation: MAC IV     Pre-Procedure Physical:  Current Facility-Administered Medications   Medication Dose Route Frequency    lactated Ringers infusion  100 mL/hr IntraVENous CONTINUOUS    famotidine (PEPCID) tablet 20 mg  20 mg Oral ON CALL    lactated Ringers infusion  25 mL/hr IntraVENous CONTINUOUS    sodium chloride (NS) flush 5-10 mL  5-10 mL IntraVENous PRN    famotidine (PF) (PEPCID) 20 mg in sodium chloride 0.9 % 10 mL injection  20 mg IntraVENous ONCE PRN      Review of patient's allergies indicates no known allergies. Patient Vitals for the past 8 hrs:   BP Temp Pulse SpO2 Height Weight   07/27/17 0619 (!) 233/105 - (!) 54 98 % - -   07/27/17 0606 - 99.1 °F (37.3 °C) - - 5' 5\" (1.651 m) 113.4 kg (250 lb)       Exam:    Airway: clear   Heart: normal S1and S2    Lungs: clear bilateral  Abdomen: soft, nontender, bowel sounds present and normal in all quads   Mental Status: awake, alert and oriented to person, place and time        Procedure Details      Informed consent was obtained for the procedure, including sedation. Risks of perforation, hemorrhage, adverse drug reaction and aspiration were discussed. The patient was placed in the left lateral decubitus position. Based on the pre-procedure assessment, including review of the patient's medical history, medications, allergies, and review of systems, she had been deemed to be an appropriate candidate for conscious sedation; she was therefore sedated with the medications listed below. The patient was monitored continuously with ECG tracing, pulse oximetry, blood pressure monitoring, and direct observations. A rectal examination was performed. The colonoscope was inserted into the rectum and advanced under direct vision to the cecum. The quality of the colonic preparation was good.  A careful inspection was made as the colonoscope was withdrawn, including a retroflexed view of the rectum; findings and interventions are described below. Appropriate photodocumentation was obtained. Findings: Moderate diverticulosis throughout the entire colon. Specimens: None    Estimated Blood Loss: 0 cc           Complications: None; patient tolerated the procedure well. Attending Attestation: I performed the procedure. Impression:    Moderate diverticulosis throughout the entire colon. Recommendations:   Trial of Bentyl. Back to office in 2 mos.

## 2017-07-27 NOTE — PROGRESS NOTES
Pt Blood pressure in Endo Prep at 233/105 in Endo prep. Dr Glynn Ratel aware and ordered 80mg Nadolol.   Also ordered 4mg of Zofran for patient c/o nausea

## 2017-07-27 NOTE — DISCHARGE INSTRUCTIONS
Gastrointestinal Colonoscopy/Flexible Sigmoidoscopy - Lower Exam Discharge Instructions  1. Call Dr. Vijay Shaffer at [phone] for any problems or questions. 2. Contact the doctors office for follow up appointment as directed  3. Medication may cause drowsiness for several hours, therefore, do not drive or operate machinery for remainder of the day. 4. No alcohol today. 5. Ordinarily, you may resume regular diet and activity after exam unless otherwise specified by your physician. 6. Because of air put into your colon during exam, you may experience some abdominal distension, relieved by the passage of gas, for several hours. 7. Contact your physician if you have any of the following:  a. Excessive amount of bleeding - large amount when having a bowel movement. Occasional specks of blood with bowel movement would not be unusual.  b. Severe abdominal pain  c. Fever or Chills  Any additional instructions:      Trial of Bentyl. Back to office in 2 mos. Instructions given to [place] and other family members.   Instructions given by:

## 2017-07-27 NOTE — ANESTHESIA PREPROCEDURE EVALUATION
Anesthetic History     PONV          Review of Systems / Medical History      Pulmonary        Sleep apnea: CPAP           Neuro/Psych              Cardiovascular    Hypertension: poorly controlled                   GI/Hepatic/Renal     GERD           Endo/Other    Diabetes: type 2    Morbid obesity     Other Findings              Physical Exam    Airway  Mallampati: II  TM Distance: > 6 cm  Neck ROM: normal range of motion   Mouth opening: Normal     Cardiovascular    Rhythm: regular  Rate: normal    Murmur: Grade 3, Aortic area     Dental    Dentition: Upper partial plate     Pulmonary  Breath sounds clear to auscultation               Abdominal         Other Findings            Anesthetic Plan    ASA: 3  Anesthesia type: total IV anesthesia            Anesthetic plan and risks discussed with: Patient

## 2017-07-27 NOTE — IP AVS SNAPSHOT
Taraezio Salmeron 
 
 
 [address] [facility] [address] [facility] 
[phone] Patient: Dinae Johnson MRN: [id number] AQM:[birth year] You are allergic to the following No active allergies Recent Documentation Height Weight Breastfeeding? BMI OB Status Smoking Status 1.651 m 113.4 kg No 41.6 kg/m2 Postmenopausal Never Smoker Emergency Contacts Name Discharge Info Relation Home Work Mobile EdyLeonid DISCHARGE CAREGIVER [3] Spouse [3] [phone] About your hospitalization You were admitted on:  July 27, 2017 You last received care in the:  [facility] You were discharged on:  July 27, 2017 Unit phone number:  [phone] Why you were hospitalized Your primary diagnosis was:  Not on File Providers Seen During Your Hospitalizations Provider Role Specialty Primary office phone Margie Munoz MD Attending Provider Gastroenterology [phone] Your Primary Care Physician (PCP) Primary Care Physician Office Phone Office Fax Akua Yon, [facility]. 60 [phone] Follow-up Information None Current Discharge Medication List  
  
ASK your doctor about these medications Dose & Instructions Dispensing Information Comments Morning Noon Evening Bedtime  
 cpap machine kit Your last dose was: Your next dose is:    
   
   
 by Does Not Apply route. 10 cm with O2 @@ 4 lpm  
 Refills:  0  
     
   
   
   
  
 losartan 100 mg tablet Commonly known as:  COZAAR Your last dose was: Your next dose is:    
   
   
 Dose:  100 mg Take 100 mg by mouth daily. Refills:  0  
     
   
   
   
  
 lovastatin 40 mg tablet Commonly known as:  MEVACOR Your last dose was: Your next dose is:    
   
   
 Dose:  40 mg Take 40 mg by mouth nightly. Refills:  0 metFORMIN 500 mg tablet Commonly known as:  GLUCOPHAGE Your last dose was: Your next dose is:    
   
   
 Dose:  1000 mg Take 1,000 mg by mouth two (2) times daily (with meals). Refills:  0  
     
   
   
   
  
 multivitamin tablet Commonly known as:  ONE A DAY Your last dose was: Your next dose is:    
   
   
 Dose:  1 Tab Take 1 Tab by mouth daily. Refills:  0  
     
   
   
   
  
 nadolol 80 mg tablet Commonly known as:  CORGARD Your last dose was: Your next dose is:    
   
   
 Dose:  80 mg Take 80 mg by mouth nightly. Refills:  0  
     
   
   
   
  
 oxybutynin 5 mg tablet Commonly known as:  MJUMEPMH Your last dose was: Your next dose is:    
   
   
 Dose:  5 mg Take 5 mg by mouth two (2) times a day. Refills:  0  
     
   
   
   
  
 oxyCODONE IR 10 mg Tab immediate release tablet Commonly known as:  Hydro Allegra Your last dose was: Your next dose is:    
   
   
 Dose:  10 mg Take 10 mg by mouth as needed. Refills:  0 PARoxetine 20 mg tablet Commonly known as:  PAXIL Your last dose was: Your next dose is:    
   
   
 Dose:  20 mg Take 20 mg by mouth every morning. Refills:  0  
     
   
   
   
  
 pioglitazone 30 mg tablet Commonly known as:  ACTOS Your last dose was: Your next dose is:    
   
   
 Dose:  30 mg Take 30 mg by mouth two (2) times a day. Refills:  0  
     
   
   
   
  
 potassium chloride 20 mEq tablet Commonly known as:  K-DUR, KLOR-CON Your last dose was: Your next dose is:    
   
   
 Dose:  20 mEq Take 20 mEq by mouth daily. Refills:  0  
     
   
   
   
  
 zolpidem 10 mg tablet Commonly known as:  AMBIEN Your last dose was: Your next dose is:    
   
   
 Dose:  15 mg Take 15 mg by mouth nightly as needed. Refills:  0 Discharge Instructions Gastrointestinal Colonoscopy/Flexible Sigmoidoscopy - Lower Exam Discharge Instructions 1. Call Dr. Юлия Payan at [phone] for any problems or questions. 2. Contact the doctors office for follow up appointment as directed 3. Medication may cause drowsiness for several hours, therefore, do not drive or operate machinery for remainder of the day. 4. No alcohol today. 5. Ordinarily, you may resume regular diet and activity after exam unless otherwise specified by your physician. 6. Because of air put into your colon during exam, you may experience some abdominal distension, relieved by the passage of gas, for several hours. 7. Contact your physician if you have any of the following: 
a. Excessive amount of bleeding  large amount when having a bowel movement. Occasional specks of blood with bowel movement would not be unusual. 
b. Severe abdominal pain 
c. Fever or Chills Any additional instructions:   
 
Trial of Bentyl. Back to office in 2 mos. Instructions given to Kolton Shah and other family members. Instructions given by:  
 
Discharge Orders None Introducing [facility]! Dear Angelic Wisdom: Thank you for requesting a [employer] account. Our records indicate that you already have an active [employer] account. You can access your account anytime at [email or web address] Did you know that you can access your hospital and ER discharge instructions at any time in [employer]? You can also review all of your test results from your hospital stay or ER visit. Additional Information If you have questions, please visit the Frequently Asked Questions section of the [employer] website at [email or web address] Remember, [employer] is NOT to be used for urgent needs. For medical emergencies, dial 911. Now available from your iPhone and Android! General Information Please provide this summary of care documentation to your next provider. Patient Signature:  ____________________________________________________________ Date:  ____________________________________________________________  
  
Larri Hazard Provider Signature:  ____________________________________________________________ Date:  ____________________________________________________________

## 2017-07-27 NOTE — ANESTHESIA POSTPROCEDURE EVALUATION
Post-Anesthesia Evaluation and Assessment    Patient: Shasta Castillo MRN: [id number]  SSN: [id number]    YOB: 1945  Age: 67 y.o. Sex: female       Cardiovascular Function/Vital Signs  Visit Vitals    /83    Pulse (!) 55    Temp 36 °C (96.8 °F)    Resp 14    Ht 5' 5\" (1.651 m)    Wt 113.4 kg (250 lb)    SpO2 93%    Breastfeeding No    BMI 41.6 kg/m2       Patient is status post total IV anesthesia anesthesia for Procedure(s):  COLONOSCOPY  BMI 41. Nausea/Vomiting: None    Postoperative hydration reviewed and adequate. Pain:  Pain Scale 1: Visual (07/27/17 0742)  Pain Intensity 1: 0 (07/27/17 0742)   Managed    Neurological Status: At baseline    Mental Status and Level of Consciousness: Arousable    Pulmonary Status:   O2 Device: Nasal cannula (07/27/17 0743)   Adequate oxygenation and airway patent    Complications related to anesthesia: None    Post-anesthesia assessment completed.  No concerns    Signed By: Norris Loza MD     July 27, 2017

## 2018-04-12 ENCOUNTER — HOME HEALTH ADMISSION (OUTPATIENT)
Dept: HOME HEALTH SERVICES | Facility: HOME HEALTH | Age: 73
End: 2018-04-12
Payer: MEDICARE

## 2018-04-18 ENCOUNTER — HOME CARE VISIT (OUTPATIENT)
Dept: SCHEDULING | Facility: HOME HEALTH | Age: 73
End: 2018-04-18
Payer: MEDICARE

## 2018-04-18 VITALS
RESPIRATION RATE: 18 BRPM | HEART RATE: 78 BPM | DIASTOLIC BLOOD PRESSURE: 68 MMHG | TEMPERATURE: 98.5 F | SYSTOLIC BLOOD PRESSURE: 118 MMHG

## 2018-04-18 PROCEDURE — 3331090002 HH PPS REVENUE DEBIT

## 2018-04-18 PROCEDURE — 400013 HH SOC

## 2018-04-18 PROCEDURE — G0151 HHCP-SERV OF PT,EA 15 MIN: HCPCS

## 2018-04-18 PROCEDURE — 3331090001 HH PPS REVENUE CREDIT

## 2018-04-19 PROCEDURE — 3331090001 HH PPS REVENUE CREDIT

## 2018-04-19 PROCEDURE — 3331090002 HH PPS REVENUE DEBIT

## 2018-04-20 PROCEDURE — 3331090002 HH PPS REVENUE DEBIT

## 2018-04-20 PROCEDURE — 3331090001 HH PPS REVENUE CREDIT

## 2018-04-21 PROCEDURE — 3331090002 HH PPS REVENUE DEBIT

## 2018-04-21 PROCEDURE — 3331090001 HH PPS REVENUE CREDIT

## 2018-04-22 PROCEDURE — 3331090002 HH PPS REVENUE DEBIT

## 2018-04-22 PROCEDURE — 3331090001 HH PPS REVENUE CREDIT

## 2018-04-23 ENCOUNTER — HOME CARE VISIT (OUTPATIENT)
Dept: SCHEDULING | Facility: HOME HEALTH | Age: 73
End: 2018-04-23
Payer: MEDICARE

## 2018-04-23 VITALS
DIASTOLIC BLOOD PRESSURE: 74 MMHG | HEART RATE: 90 BPM | TEMPERATURE: 98 F | RESPIRATION RATE: 18 BRPM | SYSTOLIC BLOOD PRESSURE: 120 MMHG

## 2018-04-23 PROCEDURE — G0151 HHCP-SERV OF PT,EA 15 MIN: HCPCS

## 2018-04-23 PROCEDURE — 3331090002 HH PPS REVENUE DEBIT

## 2018-04-23 PROCEDURE — 3331090001 HH PPS REVENUE CREDIT

## 2018-04-24 PROCEDURE — 3331090002 HH PPS REVENUE DEBIT

## 2018-04-24 PROCEDURE — 3331090001 HH PPS REVENUE CREDIT

## 2018-04-25 PROCEDURE — 3331090001 HH PPS REVENUE CREDIT

## 2018-04-25 PROCEDURE — 3331090002 HH PPS REVENUE DEBIT

## 2018-04-26 PROCEDURE — 3331090002 HH PPS REVENUE DEBIT

## 2018-04-26 PROCEDURE — 3331090001 HH PPS REVENUE CREDIT

## 2018-04-27 ENCOUNTER — HOME CARE VISIT (OUTPATIENT)
Dept: SCHEDULING | Facility: HOME HEALTH | Age: 73
End: 2018-04-27
Payer: MEDICARE

## 2018-04-27 VITALS
TEMPERATURE: 98 F | HEART RATE: 80 BPM | DIASTOLIC BLOOD PRESSURE: 74 MMHG | RESPIRATION RATE: 18 BRPM | SYSTOLIC BLOOD PRESSURE: 124 MMHG

## 2018-04-27 PROCEDURE — 3331090002 HH PPS REVENUE DEBIT

## 2018-04-27 PROCEDURE — 3331090001 HH PPS REVENUE CREDIT

## 2018-04-27 PROCEDURE — G0151 HHCP-SERV OF PT,EA 15 MIN: HCPCS

## 2018-04-28 PROCEDURE — 3331090001 HH PPS REVENUE CREDIT

## 2018-04-28 PROCEDURE — 3331090002 HH PPS REVENUE DEBIT

## 2018-04-29 PROCEDURE — 3331090001 HH PPS REVENUE CREDIT

## 2018-04-29 PROCEDURE — 3331090002 HH PPS REVENUE DEBIT

## 2018-04-30 PROCEDURE — 3331090001 HH PPS REVENUE CREDIT

## 2018-04-30 PROCEDURE — 3331090002 HH PPS REVENUE DEBIT

## 2018-05-01 PROCEDURE — 3331090001 HH PPS REVENUE CREDIT

## 2018-05-01 PROCEDURE — 3331090002 HH PPS REVENUE DEBIT

## 2018-05-02 ENCOUNTER — HOME CARE VISIT (OUTPATIENT)
Dept: SCHEDULING | Facility: HOME HEALTH | Age: 73
End: 2018-05-02
Payer: MEDICARE

## 2018-05-02 VITALS
HEART RATE: 67 BPM | RESPIRATION RATE: 17 BRPM | DIASTOLIC BLOOD PRESSURE: 80 MMHG | SYSTOLIC BLOOD PRESSURE: 140 MMHG | TEMPERATURE: 97.1 F

## 2018-05-02 PROCEDURE — 3331090001 HH PPS REVENUE CREDIT

## 2018-05-02 PROCEDURE — G0157 HHC PT ASSISTANT EA 15: HCPCS

## 2018-05-02 PROCEDURE — 3331090002 HH PPS REVENUE DEBIT

## 2018-05-03 ENCOUNTER — HOME CARE VISIT (OUTPATIENT)
Dept: SCHEDULING | Facility: HOME HEALTH | Age: 73
End: 2018-05-03
Payer: MEDICARE

## 2018-05-03 ENCOUNTER — HOME CARE VISIT (OUTPATIENT)
Dept: HOME HEALTH SERVICES | Facility: HOME HEALTH | Age: 73
End: 2018-05-03
Payer: MEDICARE

## 2018-05-03 PROCEDURE — G0157 HHC PT ASSISTANT EA 15: HCPCS

## 2018-05-03 PROCEDURE — 3331090002 HH PPS REVENUE DEBIT

## 2018-05-03 PROCEDURE — 3331090001 HH PPS REVENUE CREDIT

## 2018-05-04 PROCEDURE — 3331090002 HH PPS REVENUE DEBIT

## 2018-05-04 PROCEDURE — 3331090001 HH PPS REVENUE CREDIT

## 2018-05-05 PROCEDURE — 3331090002 HH PPS REVENUE DEBIT

## 2018-05-05 PROCEDURE — 3331090001 HH PPS REVENUE CREDIT

## 2018-05-06 PROCEDURE — 3331090001 HH PPS REVENUE CREDIT

## 2018-05-06 PROCEDURE — 3331090002 HH PPS REVENUE DEBIT

## 2018-05-07 ENCOUNTER — HOME CARE VISIT (OUTPATIENT)
Dept: SCHEDULING | Facility: HOME HEALTH | Age: 73
End: 2018-05-07
Payer: MEDICARE

## 2018-05-07 VITALS
HEART RATE: 66 BPM | DIASTOLIC BLOOD PRESSURE: 80 MMHG | SYSTOLIC BLOOD PRESSURE: 146 MMHG | TEMPERATURE: 96.2 F | RESPIRATION RATE: 16 BRPM

## 2018-05-07 PROCEDURE — 3331090001 HH PPS REVENUE CREDIT

## 2018-05-07 PROCEDURE — G0157 HHC PT ASSISTANT EA 15: HCPCS

## 2018-05-07 PROCEDURE — 3331090002 HH PPS REVENUE DEBIT

## 2018-05-08 PROCEDURE — 3331090002 HH PPS REVENUE DEBIT

## 2018-05-08 PROCEDURE — 3331090001 HH PPS REVENUE CREDIT

## 2018-05-09 ENCOUNTER — HOME CARE VISIT (OUTPATIENT)
Dept: SCHEDULING | Facility: HOME HEALTH | Age: 73
End: 2018-05-09
Payer: MEDICARE

## 2018-05-09 PROCEDURE — 3331090001 HH PPS REVENUE CREDIT

## 2018-05-09 PROCEDURE — 3331090002 HH PPS REVENUE DEBIT

## 2018-05-09 PROCEDURE — G0151 HHCP-SERV OF PT,EA 15 MIN: HCPCS

## 2018-05-10 VITALS
SYSTOLIC BLOOD PRESSURE: 128 MMHG | TEMPERATURE: 98 F | DIASTOLIC BLOOD PRESSURE: 74 MMHG | RESPIRATION RATE: 18 BRPM | HEART RATE: 84 BPM

## 2018-05-10 PROCEDURE — 3331090001 HH PPS REVENUE CREDIT

## 2018-05-10 PROCEDURE — 3331090002 HH PPS REVENUE DEBIT

## 2018-05-11 PROCEDURE — 3331090001 HH PPS REVENUE CREDIT

## 2018-05-11 PROCEDURE — 3331090002 HH PPS REVENUE DEBIT

## 2018-05-12 PROCEDURE — 3331090002 HH PPS REVENUE DEBIT

## 2018-05-12 PROCEDURE — 3331090001 HH PPS REVENUE CREDIT

## 2018-05-13 PROCEDURE — 3331090002 HH PPS REVENUE DEBIT

## 2018-05-13 PROCEDURE — 3331090001 HH PPS REVENUE CREDIT

## 2018-05-14 PROCEDURE — 3331090002 HH PPS REVENUE DEBIT

## 2018-05-14 PROCEDURE — 3331090001 HH PPS REVENUE CREDIT

## 2018-05-15 PROCEDURE — 3331090002 HH PPS REVENUE DEBIT

## 2018-05-15 PROCEDURE — 3331090001 HH PPS REVENUE CREDIT

## 2018-05-16 PROCEDURE — 3331090002 HH PPS REVENUE DEBIT

## 2018-05-16 PROCEDURE — 3331090001 HH PPS REVENUE CREDIT

## 2018-05-17 PROCEDURE — 3331090002 HH PPS REVENUE DEBIT

## 2018-05-17 PROCEDURE — 3331090001 HH PPS REVENUE CREDIT

## 2018-05-18 ENCOUNTER — HOME CARE VISIT (OUTPATIENT)
Dept: HOME HEALTH SERVICES | Facility: HOME HEALTH | Age: 73
End: 2018-05-18
Payer: MEDICARE

## 2018-05-18 PROCEDURE — 3331090002 HH PPS REVENUE DEBIT

## 2018-05-18 PROCEDURE — 3331090001 HH PPS REVENUE CREDIT

## 2018-05-19 PROCEDURE — 3331090001 HH PPS REVENUE CREDIT

## 2018-05-19 PROCEDURE — 3331090002 HH PPS REVENUE DEBIT

## 2018-05-20 PROCEDURE — 3331090001 HH PPS REVENUE CREDIT

## 2018-05-20 PROCEDURE — 3331090002 HH PPS REVENUE DEBIT

## 2018-05-21 PROCEDURE — 3331090001 HH PPS REVENUE CREDIT

## 2018-05-21 PROCEDURE — 3331090002 HH PPS REVENUE DEBIT

## 2018-05-22 PROCEDURE — 3331090001 HH PPS REVENUE CREDIT

## 2018-05-22 PROCEDURE — 3331090002 HH PPS REVENUE DEBIT

## 2018-05-23 PROCEDURE — 3331090002 HH PPS REVENUE DEBIT

## 2018-05-23 PROCEDURE — 3331090001 HH PPS REVENUE CREDIT

## 2018-05-24 PROCEDURE — 3331090002 HH PPS REVENUE DEBIT

## 2018-05-24 PROCEDURE — 3331090001 HH PPS REVENUE CREDIT

## 2018-05-25 ENCOUNTER — HOME CARE VISIT (OUTPATIENT)
Dept: SCHEDULING | Facility: HOME HEALTH | Age: 73
End: 2018-05-25
Payer: MEDICARE

## 2018-05-25 VITALS
HEART RATE: 76 BPM | SYSTOLIC BLOOD PRESSURE: 138 MMHG | TEMPERATURE: 97.7 F | RESPIRATION RATE: 17 BRPM | DIASTOLIC BLOOD PRESSURE: 80 MMHG

## 2018-05-25 PROCEDURE — 3331090001 HH PPS REVENUE CREDIT

## 2018-05-25 PROCEDURE — 3331090002 HH PPS REVENUE DEBIT

## 2018-05-25 PROCEDURE — G0157 HHC PT ASSISTANT EA 15: HCPCS

## 2018-05-26 PROCEDURE — 3331090001 HH PPS REVENUE CREDIT

## 2018-05-26 PROCEDURE — 3331090002 HH PPS REVENUE DEBIT

## 2018-05-27 PROCEDURE — 3331090001 HH PPS REVENUE CREDIT

## 2018-05-27 PROCEDURE — 3331090002 HH PPS REVENUE DEBIT

## 2018-05-28 PROCEDURE — 3331090002 HH PPS REVENUE DEBIT

## 2018-05-28 PROCEDURE — 3331090001 HH PPS REVENUE CREDIT

## 2018-05-29 PROCEDURE — 3331090001 HH PPS REVENUE CREDIT

## 2018-05-29 PROCEDURE — 3331090002 HH PPS REVENUE DEBIT

## 2018-05-30 PROCEDURE — 3331090001 HH PPS REVENUE CREDIT

## 2018-05-30 PROCEDURE — 3331090002 HH PPS REVENUE DEBIT

## 2018-05-31 ENCOUNTER — HOME CARE VISIT (OUTPATIENT)
Dept: SCHEDULING | Facility: HOME HEALTH | Age: 73
End: 2018-05-31
Payer: MEDICARE

## 2018-05-31 VITALS
HEART RATE: 78 BPM | SYSTOLIC BLOOD PRESSURE: 122 MMHG | DIASTOLIC BLOOD PRESSURE: 78 MMHG | RESPIRATION RATE: 17 BRPM | TEMPERATURE: 97.3 F

## 2018-05-31 PROCEDURE — 3331090001 HH PPS REVENUE CREDIT

## 2018-05-31 PROCEDURE — G0157 HHC PT ASSISTANT EA 15: HCPCS

## 2018-05-31 PROCEDURE — 3331090002 HH PPS REVENUE DEBIT

## 2018-06-01 PROCEDURE — 3331090001 HH PPS REVENUE CREDIT

## 2018-06-01 PROCEDURE — 3331090002 HH PPS REVENUE DEBIT

## 2018-06-02 PROCEDURE — 3331090001 HH PPS REVENUE CREDIT

## 2018-06-02 PROCEDURE — 3331090002 HH PPS REVENUE DEBIT

## 2018-06-03 PROCEDURE — 3331090001 HH PPS REVENUE CREDIT

## 2018-06-03 PROCEDURE — 3331090002 HH PPS REVENUE DEBIT

## 2018-06-04 PROCEDURE — 3331090002 HH PPS REVENUE DEBIT

## 2018-06-04 PROCEDURE — 3331090001 HH PPS REVENUE CREDIT

## 2018-06-05 PROCEDURE — 3331090002 HH PPS REVENUE DEBIT

## 2018-06-05 PROCEDURE — 3331090001 HH PPS REVENUE CREDIT

## 2018-06-06 ENCOUNTER — HOME CARE VISIT (OUTPATIENT)
Dept: SCHEDULING | Facility: HOME HEALTH | Age: 73
End: 2018-06-06
Payer: MEDICARE

## 2018-06-06 VITALS
HEART RATE: 85 BPM | TEMPERATURE: 98 F | DIASTOLIC BLOOD PRESSURE: 76 MMHG | RESPIRATION RATE: 18 BRPM | SYSTOLIC BLOOD PRESSURE: 132 MMHG

## 2018-06-06 PROCEDURE — 3331090002 HH PPS REVENUE DEBIT

## 2018-06-06 PROCEDURE — G0151 HHCP-SERV OF PT,EA 15 MIN: HCPCS

## 2018-06-06 PROCEDURE — 3331090003 HH PPS REVENUE ADJ

## 2018-06-06 PROCEDURE — 3331090001 HH PPS REVENUE CREDIT

## 2018-06-07 PROCEDURE — 3331090002 HH PPS REVENUE DEBIT

## 2018-06-07 PROCEDURE — 3331090001 HH PPS REVENUE CREDIT

## 2018-06-08 PROCEDURE — 3331090001 HH PPS REVENUE CREDIT

## 2018-06-08 PROCEDURE — 3331090002 HH PPS REVENUE DEBIT

## 2018-06-09 PROCEDURE — 3331090001 HH PPS REVENUE CREDIT

## 2018-06-09 PROCEDURE — 3331090002 HH PPS REVENUE DEBIT

## 2018-06-10 PROCEDURE — 3331090001 HH PPS REVENUE CREDIT

## 2018-06-10 PROCEDURE — 3331090002 HH PPS REVENUE DEBIT

## 2018-06-11 PROCEDURE — 3331090001 HH PPS REVENUE CREDIT

## 2018-06-11 PROCEDURE — 3331090002 HH PPS REVENUE DEBIT

## 2018-06-12 PROCEDURE — 3331090002 HH PPS REVENUE DEBIT

## 2018-06-12 PROCEDURE — 3331090001 HH PPS REVENUE CREDIT

## 2018-06-13 PROCEDURE — 3331090002 HH PPS REVENUE DEBIT

## 2018-06-13 PROCEDURE — 3331090001 HH PPS REVENUE CREDIT

## 2018-06-14 PROCEDURE — 3331090001 HH PPS REVENUE CREDIT

## 2018-06-14 PROCEDURE — 3331090002 HH PPS REVENUE DEBIT

## 2018-10-03 ENCOUNTER — HOSPITAL ENCOUNTER (OUTPATIENT)
Dept: SURGERY | Age: 73
Discharge: HOME | End: 2018-10-03
Payer: MEDICARE

## 2018-10-03 VITALS
TEMPERATURE: 97.8 F | WEIGHT: 227.4 LBS | HEIGHT: 64 IN | OXYGEN SATURATION: 93 % | DIASTOLIC BLOOD PRESSURE: 89 MMHG | RESPIRATION RATE: 17 BRPM | SYSTOLIC BLOOD PRESSURE: 172 MMHG | BODY MASS INDEX: 38.82 KG/M2 | HEART RATE: 54 BPM

## 2018-10-03 LAB
ANION GAP SERPL CALC-SCNC: 10 MMOL/L (ref 7–16)
APPEARANCE UR: CLEAR
BACTERIA SPEC CULT: NORMAL
BASOPHILS # BLD: 0.1 K/UL (ref 0–0.2)
BASOPHILS NFR BLD: 1 % (ref 0–2)
BILIRUB UR QL: NEGATIVE
BUN SERPL-MCNC: 9 MG/DL (ref 8–23)
CALCIUM SERPL-MCNC: 9.1 MG/DL (ref 8.3–10.4)
CHLORIDE SERPL-SCNC: 103 MMOL/L (ref 98–107)
CO2 SERPL-SCNC: 26 MMOL/L (ref 21–32)
COLOR UR: YELLOW
CREAT SERPL-MCNC: 0.66 MG/DL (ref 0.6–1)
DIFFERENTIAL METHOD BLD: NORMAL
EOSINOPHIL # BLD: 0.5 K/UL (ref 0–0.8)
EOSINOPHIL NFR BLD: 5 % (ref 0.5–7.8)
ERYTHROCYTE [DISTWIDTH] IN BLOOD BY AUTOMATED COUNT: 14 %
EST. AVERAGE GLUCOSE BLD GHB EST-MCNC: 148 MG/DL
GLUCOSE BLD STRIP.AUTO-MCNC: 107 MG/DL (ref 65–100)
GLUCOSE SERPL-MCNC: 104 MG/DL (ref 65–100)
GLUCOSE UR STRIP.AUTO-MCNC: NEGATIVE MG/DL
HBA1C MFR BLD: 6.8 % (ref 4.8–6)
HCT VFR BLD AUTO: 37.7 % (ref 35.8–46.3)
HGB BLD-MCNC: 11.9 G/DL (ref 11.7–15.4)
HGB UR QL STRIP: NEGATIVE
IMM GRANULOCYTES # BLD: 0.1 K/UL (ref 0–0.5)
IMM GRANULOCYTES NFR BLD AUTO: 1 % (ref 0–5)
KETONES UR QL STRIP.AUTO: NEGATIVE MG/DL
LEUKOCYTE ESTERASE UR QL STRIP.AUTO: NEGATIVE
LYMPHOCYTES # BLD: 2 K/UL (ref 0.5–4.6)
LYMPHOCYTES NFR BLD: 21 % (ref 13–44)
MCH RBC QN AUTO: 27.7 PG (ref 26.1–32.9)
MCHC RBC AUTO-ENTMCNC: 31.6 G/DL (ref 31.4–35)
MCV RBC AUTO: 87.7 FL (ref 79.6–97.8)
MONOCYTES # BLD: 0.7 K/UL (ref 0.1–1.3)
MONOCYTES NFR BLD: 7 % (ref 4–12)
NEUTS SEG # BLD: 6.3 K/UL (ref 1.7–8.2)
NEUTS SEG NFR BLD: 66 % (ref 43–78)
NITRITE UR QL STRIP.AUTO: NEGATIVE
NRBC # BLD: 0 K/UL (ref 0–0.2)
PH UR STRIP: 5 [PH] (ref 5–9)
PLATELET # BLD AUTO: 350 K/UL (ref 150–450)
PMV BLD AUTO: 9.7 FL (ref 9.4–12.3)
POTASSIUM SERPL-SCNC: 3.5 MMOL/L (ref 3.5–5.1)
PROT UR STRIP-MCNC: NEGATIVE MG/DL
RBC # BLD AUTO: 4.3 M/UL (ref 4.05–5.2)
SERVICE CMNT-IMP: NORMAL
SODIUM SERPL-SCNC: 139 MMOL/L (ref 136–145)
SP GR UR REFRACTOMETRY: 1.02 (ref 1–1.02)
UROBILINOGEN UR QL STRIP.AUTO: 0.2 EU/DL (ref 0.2–1)
WBC # BLD AUTO: 9.6 K/UL (ref 4.3–11.1)

## 2018-10-03 PROCEDURE — 83036 HEMOGLOBIN GLYCOSYLATED A1C: CPT

## 2018-10-03 PROCEDURE — 80048 BASIC METABOLIC PNL TOTAL CA: CPT

## 2018-10-03 PROCEDURE — 87641 MR-STAPH DNA AMP PROBE: CPT

## 2018-10-03 PROCEDURE — 77030027138 HC INCENT SPIROMETER -A

## 2018-10-03 PROCEDURE — 85025 COMPLETE CBC W/AUTO DIFF WBC: CPT

## 2018-10-03 PROCEDURE — 81003 URINALYSIS AUTO W/O SCOPE: CPT

## 2018-10-03 PROCEDURE — 82962 GLUCOSE BLOOD TEST: CPT

## 2018-10-03 RX ORDER — CYANOCOBALAMIN (VITAMIN B-12) 2000 MCG
2000 TABLET ORAL DAILY
COMMUNITY

## 2018-10-03 RX ORDER — AMLODIPINE BESYLATE 10 MG/1
10 TABLET ORAL
COMMUNITY
Start: 2018-07-30 | End: 2019-07-30

## 2018-10-03 RX ORDER — CHOLECALCIFEROL TAB 125 MCG (5000 UNIT) 125 MCG
TAB ORAL DAILY
COMMUNITY

## 2018-10-03 RX ORDER — AMLODIPINE BESYLATE 5 MG/1
5 TABLET ORAL DAILY
COMMUNITY
End: 2018-10-03

## 2018-10-03 RX ORDER — OMEPRAZOLE 20 MG/1
20 CAPSULE, DELAYED RELEASE ORAL 2 TIMES DAILY
COMMUNITY

## 2018-10-03 RX ORDER — LEVOTHYROXINE SODIUM 50 UG/1
50 TABLET ORAL
COMMUNITY

## 2018-10-03 RX ORDER — OXYCODONE HYDROCHLORIDE 5 MG/1
TABLET ORAL
Refills: 0 | COMMUNITY
Start: 2018-07-16 | End: 2018-11-02

## 2018-10-03 NOTE — PROGRESS NOTES
Type 1B surgery,  assessment complete. Labs per surgeon: Hgb A1C, MRSA/ MSSA swab, CBC with diff, BMP, UA Labs per anesthesia protocol: none required EKG: not required Murmur noted- patient states no known history. Last ECHO 11/11/2014, and 2 previous EKGs (7/22/17, 7/13/17), and current HR mid 50's reviewed by Dr. Alexander Viramontes when in to assess patient. No new orders given. Glucose: 107 Hibiclens and instructions given per hospital policy. Pt viewed Spine Pre-hab video. All further questions were addressed. Pt was provided with antibacterial soap, Hibiclens, long-handled sponge, Spine Recovery booklet and incentive spirometer. Pt correctly demonstrated use of incentive spirometer and instruction to bring it to the hospital on day of surgery. Handouts and all Surgery instructions provided to pt and pt verbalizes understanding. Patient provided with and instructed on educational handouts including Guide to Surgery, Pain Management, Hand Hygiene, Blood Transfusion Education, and [facility] Brochure. Patient answered medical/surgical history questions at their best of ability. All prior to admission medications documented in [facility]. Patient instructed to hold all vitamins 7 days prior to surgery and NSAIDS 5 days prior to surgery, patient verbalized understanding. Medications to be held: none Patient instructed to continue previous medications as prescribed prior to surgery and to take the following medications the day of surgery according to anesthesia guidelines with a small sip of water: amlodipine, horizant, synthroid, prilosec, ditropan, paxil, oxycodone. Patient instructed on the following: 
Arrive at [facility], time of arrival to be called the day before by 1700 NPO after midnight including gum, mints, and ice chips. Responsible adult must drive patient to the hospital, stay during surgery, and patient will require supervision 24 hours after anesthesia. Use hibiclen in shower the night before surgery and on the morning of surgery. Leave all valuables (money and jewelry) at home but bring insurance card and ID on       DOS. Do not wear make-up, nail polish, lotions, cologne, perfumes, powders, or oil on skin. Patient teach back successful and patient demonstrates knowledge of instruction. Patient verified name, , and surgery as listed in [facility].

## 2018-10-05 NOTE — H&P
Allergies:?????NKDA Medications:Ativan (0.5 MG, take 1tablet 1 hour prior to mri prn anxiety NEEDS ); Centrum Silver;Cyclobenzaprine HCl (10 MG, Take 1 tablet(s) by mouth 3 times a day as needed [PRN]);Gabapentin (300 MG, Take 1 tablet(s) by mouth 2 times a day for 30 days); GlipiZIDE; Invokana;Klor-Con;Levothyroxine Sodium; Losartan Potassium; Lovastatin;MetFORMIN HCl;Nadolol;Norco ( MG, Take 1 tablet(s) by mouth every 4-6 hours as needed [PRN]); Omeprazole;PARoxetine HCl;PredniSONE (10 MG (48), USE AS DIRECTED); Vitamin B12;Vitamin D3;Zofran (4 MG, Take 1 tablet(s) by mouth every 4-6 hours as needed [PRN]); Zolpidem Tartrate CC: Low back pain HPI:  Recheck. I last her in 04/2018. She is 68years-old. She has been having back and right leg pain with some weakness. An old scan showed an L3-4 herniated disc and a newer scan showed a bigger herniation at L3-4. She did not get long term relief with conservative treatments, but she also has a spondylolisthesis at L3-4. We did not feel that she was a good surgical candidate for a fusion. She is morbidly obese. She is very deconditioned. Her bones are osteopenic. We thought there would be lots of complications. She has been at [facility] and they have done a stimulator trial which worked very well. It only partially helped with the leg pain, but all the back pain resolved. She is here for a permanent placement. We discussed the surgery, overnight stay, restrictions for 6 weeks; no reaching overhead, twisting or bending could make the lead move; the fact that it is not turned on the day of surgery. She has to have a follow-up a couple of days later for them to program it. We went through the risks, including death, paralysis, infection, bleeding, transfusion, heart attack, stroke, clot in the leg, clot in the lung, dural tear, migration of lead and failure to get pain relief.   I answered her questions. She would like the battery in the left buttock; she sleeps on the right and that is where we normally put it. I would say if she gets a rechargeable it has to be the small one. We did look at her back and buttocks. Even though she is morbidly obese, there is not any spot in the back for the battery. It will have to be in the buttocks. We went through her medical history. She is a noninsulin dependent diabetic. She is not on any blood thinners. She is relatively healthy outside of her obesity and general debility. She really has not walked much in a while. She is getting around in a wheelchair. PE:  On exam, pupils are equal, round and reactive to light. Oropharynx is clear. Neck is without adenopathy. Her lungs were clear to auscultation bilaterally. Heart is regular rate and rhythm without murmur. Abdomen is morbidly obese, soft and nontender. She is alert and oriented x3 with normal affect. X-RAYS:  We did get AP and lateral thoracolumbar x-rays today, 09/18/2018. On the AP view, she is straight. It looks like there is some degenerative changes around the thoracolumbar junction on the lateral view. No acute kyphosis or fracture, but I do see degenerative changes. We can see down to L3-4 where she has a spondylolisthesis and it also looks like she has a spondylolisthesis L5-S1. X-ray Impression:  Degenerative spine; the bone looks osteopenic. She has spondylolisthesis L3-4 and L5-S1. No obvious acute fractures. ASSESSMENT/PLAN:  Once again, I discussed everything and answered her questions. She would like to proceed; it is a [place] and it is for right leg pain and back pain.     
 
 
 
Electronically Signed By Obey Ferrer MD

## 2018-10-10 ENCOUNTER — ANESTHESIA EVENT (OUTPATIENT)
Dept: SURGERY | Age: 73
DRG: 189 | End: 2018-10-10
Payer: MEDICARE

## 2018-10-10 RX ORDER — HYDROMORPHONE HYDROCHLORIDE 2 MG/ML
0.5 INJECTION, SOLUTION INTRAMUSCULAR; INTRAVENOUS; SUBCUTANEOUS
Status: CANCELLED | OUTPATIENT
Start: 2018-10-10

## 2018-10-10 RX ORDER — SODIUM CHLORIDE 0.9 % (FLUSH) 0.9 %
5-10 SYRINGE (ML) INJECTION AS NEEDED
Status: CANCELLED | OUTPATIENT
Start: 2018-10-10

## 2018-10-10 RX ORDER — OXYCODONE HYDROCHLORIDE 5 MG/1
5 TABLET ORAL
Status: CANCELLED | OUTPATIENT
Start: 2018-10-10 | End: 2018-10-11

## 2018-10-10 RX ORDER — ALBUTEROL SULFATE 0.83 MG/ML
2.5 SOLUTION RESPIRATORY (INHALATION) AS NEEDED
Status: CANCELLED | OUTPATIENT
Start: 2018-10-10

## 2018-10-10 RX ORDER — SODIUM CHLORIDE, SODIUM LACTATE, POTASSIUM CHLORIDE, CALCIUM CHLORIDE 600; 310; 30; 20 MG/100ML; MG/100ML; MG/100ML; MG/100ML
50 INJECTION, SOLUTION INTRAVENOUS CONTINUOUS
Status: CANCELLED | OUTPATIENT
Start: 2018-10-10

## 2018-10-11 ENCOUNTER — HOSPITAL ENCOUNTER (INPATIENT)
Age: 73
LOS: 1 days | Discharge: HOME | DRG: 189 | End: 2018-10-12
Attending: ORTHOPAEDIC SURGERY | Admitting: INTERNAL MEDICINE
Payer: MEDICARE

## 2018-10-11 ENCOUNTER — APPOINTMENT (OUTPATIENT)
Dept: GENERAL RADIOLOGY | Age: 73
DRG: 189 | End: 2018-10-11
Attending: ORTHOPAEDIC SURGERY
Payer: MEDICARE

## 2018-10-11 ENCOUNTER — APPOINTMENT (OUTPATIENT)
Dept: GENERAL RADIOLOGY | Age: 73
DRG: 189 | End: 2018-10-11
Attending: ANESTHESIOLOGY
Payer: MEDICARE

## 2018-10-11 ENCOUNTER — APPOINTMENT (OUTPATIENT)
Dept: CT IMAGING | Age: 73
DRG: 189 | End: 2018-10-11
Attending: INTERNAL MEDICINE
Payer: MEDICARE

## 2018-10-11 ENCOUNTER — ANESTHESIA (OUTPATIENT)
Dept: SURGERY | Age: 73
DRG: 189 | End: 2018-10-11
Payer: MEDICARE

## 2018-10-11 DIAGNOSIS — G47.33 OBSTRUCTIVE SLEEP APNEA: Chronic | ICD-10-CM

## 2018-10-11 DIAGNOSIS — D72.829 LEUKOCYTOSIS, UNSPECIFIED TYPE: ICD-10-CM

## 2018-10-11 DIAGNOSIS — J96.01 ACUTE RESPIRATORY FAILURE WITH HYPOXIA (HCC): ICD-10-CM

## 2018-10-11 PROBLEM — R35.0 URINARY FREQUENCY: Status: ACTIVE | Noted: 2018-10-11

## 2018-10-11 PROBLEM — G89.4 CHRONIC PAIN SYNDROME: Status: ACTIVE | Noted: 2018-10-11

## 2018-10-11 PROBLEM — M54.17 LUMBOSACRAL NEURITIS: Status: ACTIVE | Noted: 2018-10-11

## 2018-10-11 PROBLEM — J96.00 ACUTE RESPIRATORY FAILURE (HCC): Status: ACTIVE | Noted: 2018-10-11

## 2018-10-11 LAB
APPEARANCE UR: CLEAR
BILIRUB UR QL: NEGATIVE
COLOR UR: YELLOW
D DIMER PPP FEU-MCNC: 0.63 UG/ML(FEU)
ERYTHROCYTE [DISTWIDTH] IN BLOOD BY AUTOMATED COUNT: 14 %
GLUCOSE BLD STRIP.AUTO-MCNC: 117 MG/DL (ref 65–100)
GLUCOSE BLD STRIP.AUTO-MCNC: 134 MG/DL (ref 65–100)
GLUCOSE BLD STRIP.AUTO-MCNC: 147 MG/DL (ref 65–100)
GLUCOSE UR STRIP.AUTO-MCNC: NEGATIVE MG/DL
HCT VFR BLD AUTO: 38.2 % (ref 35.8–46.3)
HGB BLD-MCNC: 12.3 G/DL (ref 11.7–15.4)
HGB UR QL STRIP: NEGATIVE
KETONES UR QL STRIP.AUTO: NEGATIVE MG/DL
LEUKOCYTE ESTERASE UR QL STRIP.AUTO: NEGATIVE
MCH RBC QN AUTO: 27.3 PG (ref 26.1–32.9)
MCHC RBC AUTO-ENTMCNC: 32.2 G/DL (ref 31.4–35)
MCV RBC AUTO: 84.9 FL (ref 79.6–97.8)
NITRITE UR QL STRIP.AUTO: NEGATIVE
NRBC # BLD: 0 K/UL (ref 0–0.2)
PH UR STRIP: 5.5 [PH] (ref 5–9)
PLATELET # BLD AUTO: 340 K/UL (ref 150–450)
PMV BLD AUTO: 9.5 FL (ref 9.4–12.3)
PROT UR STRIP-MCNC: NEGATIVE MG/DL
RBC # BLD AUTO: 4.5 M/UL (ref 4.05–5.2)
SP GR UR REFRACTOMETRY: 1.01 (ref 1–1.02)
UROBILINOGEN UR QL STRIP.AUTO: 0.2 EU/DL (ref 0.2–1)
WBC # BLD AUTO: 11.3 K/UL (ref 4.3–11.1)

## 2018-10-11 PROCEDURE — 74011000250 HC RX REV CODE- 250: Performed by: ANESTHESIOLOGY

## 2018-10-11 PROCEDURE — 94660 CPAP INITIATION&MGMT: CPT

## 2018-10-11 PROCEDURE — 85027 COMPLETE CBC AUTOMATED: CPT

## 2018-10-11 PROCEDURE — 74011250636 HC RX REV CODE- 250/636: Performed by: INTERNAL MEDICINE

## 2018-10-11 PROCEDURE — 77030032490 HC SLV COMPR SCD KNE COVD -B

## 2018-10-11 PROCEDURE — 74011250637 HC RX REV CODE- 250/637: Performed by: INTERNAL MEDICINE

## 2018-10-11 PROCEDURE — 99223 1ST HOSP IP/OBS HIGH 75: CPT | Performed by: INTERNAL MEDICINE

## 2018-10-11 PROCEDURE — 74011250636 HC RX REV CODE- 250/636: Performed by: ANESTHESIOLOGY

## 2018-10-11 PROCEDURE — 74011250637 HC RX REV CODE- 250/637: Performed by: ORTHOPAEDIC SURGERY

## 2018-10-11 PROCEDURE — 65270000029 HC RM PRIVATE

## 2018-10-11 PROCEDURE — 94760 N-INVAS EAR/PLS OXIMETRY 1: CPT

## 2018-10-11 PROCEDURE — 36415 COLL VENOUS BLD VENIPUNCTURE: CPT

## 2018-10-11 PROCEDURE — 81003 URINALYSIS AUTO W/O SCOPE: CPT

## 2018-10-11 PROCEDURE — 71045 X-RAY EXAM CHEST 1 VIEW: CPT

## 2018-10-11 PROCEDURE — 94640 AIRWAY INHALATION TREATMENT: CPT

## 2018-10-11 PROCEDURE — 82962 GLUCOSE BLOOD TEST: CPT

## 2018-10-11 PROCEDURE — 77030012894: Performed by: ORTHOPAEDIC SURGERY

## 2018-10-11 PROCEDURE — 85379 FIBRIN DEGRADATION QUANT: CPT

## 2018-10-11 RX ORDER — SODIUM CHLORIDE 0.9 % (FLUSH) 0.9 %
5-10 SYRINGE (ML) INJECTION AS NEEDED
Status: DISCONTINUED | OUTPATIENT
Start: 2018-10-11 | End: 2018-10-11

## 2018-10-11 RX ORDER — ALBUTEROL SULFATE 0.83 MG/ML
2.5 SOLUTION RESPIRATORY (INHALATION)
Status: COMPLETED | OUTPATIENT
Start: 2018-10-11 | End: 2018-10-11

## 2018-10-11 RX ORDER — ONDANSETRON 2 MG/ML
4 INJECTION INTRAMUSCULAR; INTRAVENOUS
Status: COMPLETED | OUTPATIENT
Start: 2018-10-11 | End: 2018-10-11

## 2018-10-11 RX ORDER — MIDAZOLAM HYDROCHLORIDE 1 MG/ML
2 INJECTION, SOLUTION INTRAMUSCULAR; INTRAVENOUS
Status: DISCONTINUED | OUTPATIENT
Start: 2018-10-11 | End: 2018-10-11

## 2018-10-11 RX ORDER — ZOLPIDEM TARTRATE 5 MG/1
5 TABLET ORAL
Status: DISCONTINUED | OUTPATIENT
Start: 2018-10-11 | End: 2018-10-13 | Stop reason: HOSPADM

## 2018-10-11 RX ORDER — CEFAZOLIN SODIUM/WATER 2 G/20 ML
2 SYRINGE (ML) INTRAVENOUS ONCE
Status: DISCONTINUED | OUTPATIENT
Start: 2018-10-11 | End: 2018-10-11

## 2018-10-11 RX ORDER — LOSARTAN POTASSIUM 50 MG/1
100 TABLET ORAL DAILY
Status: DISCONTINUED | OUTPATIENT
Start: 2018-10-12 | End: 2018-10-13 | Stop reason: HOSPADM

## 2018-10-11 RX ORDER — LEVOTHYROXINE SODIUM 50 UG/1
50 TABLET ORAL
Status: DISCONTINUED | OUTPATIENT
Start: 2018-10-12 | End: 2018-10-13 | Stop reason: HOSPADM

## 2018-10-11 RX ORDER — NADOLOL 40 MG/1
80 TABLET ORAL
Status: DISCONTINUED | OUTPATIENT
Start: 2018-10-11 | End: 2018-10-13 | Stop reason: HOSPADM

## 2018-10-11 RX ORDER — HEPARIN SODIUM 5000 [USP'U]/ML
5000 INJECTION, SOLUTION INTRAVENOUS; SUBCUTANEOUS EVERY 8 HOURS
Status: DISCONTINUED | OUTPATIENT
Start: 2018-10-11 | End: 2018-10-13 | Stop reason: HOSPADM

## 2018-10-11 RX ORDER — HYDRALAZINE HYDROCHLORIDE 20 MG/ML
20 INJECTION INTRAMUSCULAR; INTRAVENOUS
Status: DISCONTINUED | OUTPATIENT
Start: 2018-10-11 | End: 2018-10-13 | Stop reason: HOSPADM

## 2018-10-11 RX ORDER — FENTANYL CITRATE 50 UG/ML
100 INJECTION, SOLUTION INTRAMUSCULAR; INTRAVENOUS ONCE
Status: DISCONTINUED | OUTPATIENT
Start: 2018-10-11 | End: 2018-10-11

## 2018-10-11 RX ORDER — PAROXETINE HYDROCHLORIDE 20 MG/1
20 TABLET, FILM COATED ORAL
Status: DISCONTINUED | OUTPATIENT
Start: 2018-10-12 | End: 2018-10-13 | Stop reason: HOSPADM

## 2018-10-11 RX ORDER — OXYBUTYNIN CHLORIDE 5 MG/1
5 TABLET ORAL 2 TIMES DAILY
Status: DISCONTINUED | OUTPATIENT
Start: 2018-10-11 | End: 2018-10-13 | Stop reason: HOSPADM

## 2018-10-11 RX ORDER — CELECOXIB 200 MG/1
200 CAPSULE ORAL
Status: DISCONTINUED | OUTPATIENT
Start: 2018-10-11 | End: 2018-10-11

## 2018-10-11 RX ORDER — SODIUM CHLORIDE 0.9 % (FLUSH) 0.9 %
5-10 SYRINGE (ML) INJECTION EVERY 8 HOURS
Status: DISCONTINUED | OUTPATIENT
Start: 2018-10-11 | End: 2018-10-11

## 2018-10-11 RX ORDER — AMLODIPINE BESYLATE 10 MG/1
10 TABLET ORAL DAILY
Status: DISCONTINUED | OUTPATIENT
Start: 2018-10-12 | End: 2018-10-13 | Stop reason: HOSPADM

## 2018-10-11 RX ORDER — SIMVASTATIN 20 MG/1
20 TABLET, FILM COATED ORAL
Status: DISCONTINUED | OUTPATIENT
Start: 2018-10-11 | End: 2018-10-13 | Stop reason: HOSPADM

## 2018-10-11 RX ORDER — SODIUM CHLORIDE 0.9 % (FLUSH) 0.9 %
5-10 SYRINGE (ML) INJECTION AS NEEDED
Status: DISCONTINUED | OUTPATIENT
Start: 2018-10-11 | End: 2018-10-13 | Stop reason: HOSPADM

## 2018-10-11 RX ORDER — SODIUM CHLORIDE 0.9 % (FLUSH) 0.9 %
5-10 SYRINGE (ML) INJECTION EVERY 8 HOURS
Status: DISCONTINUED | OUTPATIENT
Start: 2018-10-11 | End: 2018-10-13 | Stop reason: HOSPADM

## 2018-10-11 RX ORDER — PANTOPRAZOLE SODIUM 40 MG/1
40 TABLET, DELAYED RELEASE ORAL
Status: DISCONTINUED | OUTPATIENT
Start: 2018-10-12 | End: 2018-10-13 | Stop reason: HOSPADM

## 2018-10-11 RX ORDER — SODIUM CHLORIDE, SODIUM LACTATE, POTASSIUM CHLORIDE, CALCIUM CHLORIDE 600; 310; 30; 20 MG/100ML; MG/100ML; MG/100ML; MG/100ML
75 INJECTION, SOLUTION INTRAVENOUS CONTINUOUS
Status: DISCONTINUED | OUTPATIENT
Start: 2018-10-11 | End: 2018-10-11

## 2018-10-11 RX ORDER — MIDAZOLAM HYDROCHLORIDE 1 MG/ML
2 INJECTION, SOLUTION INTRAMUSCULAR; INTRAVENOUS ONCE
Status: DISCONTINUED | OUTPATIENT
Start: 2018-10-11 | End: 2018-10-11

## 2018-10-11 RX ORDER — LIDOCAINE HYDROCHLORIDE 10 MG/ML
0.1 INJECTION INFILTRATION; PERINEURAL AS NEEDED
Status: DISCONTINUED | OUTPATIENT
Start: 2018-10-11 | End: 2018-10-11

## 2018-10-11 RX ORDER — APREPITANT 40 MG/1
40 CAPSULE ORAL
Status: COMPLETED | OUTPATIENT
Start: 2018-10-11 | End: 2018-10-11

## 2018-10-11 RX ORDER — ONDANSETRON 2 MG/ML
4 INJECTION INTRAMUSCULAR; INTRAVENOUS
Status: DISCONTINUED | OUTPATIENT
Start: 2018-10-11 | End: 2018-10-13 | Stop reason: HOSPADM

## 2018-10-11 RX ORDER — OXYCODONE HYDROCHLORIDE 5 MG/1
5 TABLET ORAL
Status: DISCONTINUED | OUTPATIENT
Start: 2018-10-11 | End: 2018-10-13 | Stop reason: HOSPADM

## 2018-10-11 RX ADMIN — ALBUTEROL SULFATE 2.5 MG: 2.5 SOLUTION RESPIRATORY (INHALATION) at 13:09

## 2018-10-11 RX ADMIN — SODIUM CHLORIDE, SODIUM LACTATE, POTASSIUM CHLORIDE, AND CALCIUM CHLORIDE 75 ML/HR: 600; 310; 30; 20 INJECTION, SOLUTION INTRAVENOUS at 11:18

## 2018-10-11 RX ADMIN — OXYCODONE HYDROCHLORIDE 5 MG: 5 TABLET ORAL at 22:44

## 2018-10-11 RX ADMIN — ZOLPIDEM TARTRATE 5 MG: 5 TABLET ORAL at 22:40

## 2018-10-11 RX ADMIN — ONDANSETRON 4 MG: 2 INJECTION INTRAMUSCULAR; INTRAVENOUS at 11:26

## 2018-10-11 RX ADMIN — Medication 3 AMPULE: at 11:27

## 2018-10-11 RX ADMIN — Medication 10 ML: at 17:25

## 2018-10-11 RX ADMIN — HEPARIN SODIUM 5000 UNITS: 5000 INJECTION INTRAVENOUS; SUBCUTANEOUS at 17:25

## 2018-10-11 RX ADMIN — Medication 10 ML: at 20:49

## 2018-10-11 RX ADMIN — SIMVASTATIN 20 MG: 20 TABLET, FILM COATED ORAL at 20:46

## 2018-10-11 RX ADMIN — OXYCODONE HYDROCHLORIDE 5 MG: 5 TABLET ORAL at 17:24

## 2018-10-11 RX ADMIN — NADOLOL 80 MG: 40 TABLET ORAL at 20:47

## 2018-10-11 RX ADMIN — APREPITANT 40 MG: 40 CAPSULE ORAL at 11:33

## 2018-10-11 RX ADMIN — OXYBUTYNIN CHLORIDE 5 MG: 5 TABLET ORAL at 17:24

## 2018-10-11 NOTE — PROGRESS NOTES
TRANSFER - IN REPORT: 
 
Verbal report received from Lien, RN(name) on Benson Gibbs  being received from PRE[facility](unit) for routine progression of care Report consisted of patients Situation, Background, Assessment and  
Recommendations(SBAR). Information from the following report(s) SBAR, Kardex, Intake/Output, MAR, Accordion and Recent Results was reviewed with the receiving nurse. Opportunity for questions and clarification was provided. Assessment completed upon patients arrival to unit and care assumed.

## 2018-10-11 NOTE — IP AVS SNAPSHOT
Giovanni Min 
 
 
 [address] [facility] [address] [facility] 
[phone] Patient: Opal Hood MRN: [id number] CWE:[id number] About your hospitalization You were admitted on:  October 11, 2018 You last received care in the:  [facility] 7 MED SURG You were discharged on:  October 12, 2018 Why you were hospitalized Your primary diagnosis was:  Acute Respiratory Failure With Hypoxia (Hcc) Your diagnoses also included:  Lumbosacral Neuritis, Chronic Pain Syndrome, Diabetes Mellitus (Hcc), Obesity, Obstructive Sleep Apnea, Leukocytosis, Urinary Frequency, Moderate Aortic Stenosis Follow-up Information Follow up With Details Comments Contact Info Gatha Favre, MD Schedule an appointment as soon as possible for a visit in 1 week  [address] [place] [address] 
[phone] Tam Ngo MD Schedule an appointment as soon as possible for a visit in 1 month  [facility]  [address] [facility], [place] [id number] [phone] Kai Zheng MD Schedule an appointment as soon as possible for a visit in 1 month  [facility] [address] [facility], [place] [address] 
[phone] Discharge Orders None A check flavia indicates which time of day the medication should be taken. My Medications CONTINUE taking these medications Instructions Each Dose to Equal  
 Morning Noon Evening Bedtime  
 amLODIPine 10 mg tablet Commonly known as:  Elly Anders Your last dose was: This morning  10/12 Your next dose is:  Resume home schedule Take 10 mg by mouth. 10 mg  
    
   
   
   
  
 cholecalciferol (VITAMIN D3) 5,000 unit Tab tablet Commonly known as:  VITAMIN D3 Your next dose is:  Tomorrow morning Take  by mouth daily. cpap machine kit  
   
 by Does Not Apply route. 10 cm with O2 @@ 4 lpm  
     
   
   
   
  
 cyanocobalamin (vitamin B-12) 2,000 mcg Tab Your next dose is:  Resume home schedule Take  by mouth. HORIZANT 600 mg Tber Generic drug:  gabapentin enacarbil Your next dose is:  Resume home schedule Take  by mouth.  
     
   
   
   
  
 levothyroxine 50 mcg tablet Commonly known as:  SYNTHROID Your last dose was: This morning  10/12 Your next dose is:  Tomorrow morning Take 50 mcg by mouth Daily (before breakfast). 50 mcg  
    
  
   
   
   
  
 losartan 100 mg tablet Commonly known as:  COZAAR Your last dose was: This morning  10/12 Your next dose is:  Tomorrow morning Take 100 mg by mouth daily. 100 mg  
    
  
   
   
   
  
 lovastatin 40 mg tablet Commonly known as:  MEVACOR Your next dose is: Take tonight Take 40 mg by mouth nightly. 40 mg  
    
   
   
   
  
  
 metFORMIN 500 mg tablet Commonly known as:  GLUCOPHAGE Your next dose is:  Tomorrow morning 10/13 Take 1,000 mg by mouth two (2) times daily (with meals). 1000 mg  
    
  
   
   
  
   
  
 multivitamin tablet Commonly known as:  ONE A DAY Your next dose is:  Tomorrow morning Take 1 Tab by mouth daily. 1 Tab  
    
  
   
   
   
  
 nadolol 80 mg tablet Commonly known as:  CORGARD Your last dose was: Last night 10/11 Your next dose is: Take tonight Take 80 mg by mouth nightly. 80 mg  
    
   
   
   
  
  
 omeprazole 20 mg capsule Commonly known as:  PRILOSEC Your next dose is:  Tomorrow morning 10/13 Take 20 mg by mouth two (2) times a day. 20 mg  
    
  
   
   
  
   
  
 oxybutynin 5 mg tablet Commonly known as:  XZRPMTBY Your last dose was: This evening  10/12 Your next dose is:  Tomorrow morning 10/13 Take 5 mg by mouth two (2) times a day. 5 mg  
    
  
   
   
  
   
  
 oxyCODONE IR 5 mg immediate release tablet Commonly known as:  Amarilis Reasons Your last dose was:   This evening at 6 PM  
 Your next dose is: Take today if needed after 2 AM  
   
 TAKE 1 TABLET BY MOUTH EVERY 8 HOURS FOR 30 DAYS PARoxetine 20 mg tablet Commonly known as:  PAXIL Your last dose was: This morning  10/12 Your next dose is:  Tomorrow morning Take 20 mg by mouth every morning. 20 mg  
    
  
   
   
   
  
 zolpidem 10 mg tablet Commonly known as:  AMBIEN Your next dose is: Take tonight if needed Take 15 mg by mouth nightly as needed. 15 mg Opioid Education Prescription Opioids: What You Need to Know: 
 
Prescription opioids can be used to help relieve moderate-to-severe pain and are often prescribed following a surgery or injury, or for certain health conditions. These medications can be an important part of treatment but also come with serious risks. Opioids are strong pain medicines. Examples include hydrocodone, oxycodone, fentanyl, and morphine. Heroin is an example of an illegal opioid. It is important to work with your health care provider to make sure you are getting the safest, most effective care. WHAT ARE THE RISKS AND SIDE EFFECTS OF OPIOID USE? Prescription opioids carry serious risks of addiction and overdose, especially with prolonged use. An opioid overdose, often marked by slow breathing, can cause sudden death. The use of prescription opioids can have a number of side effects as well, even when taken as directed. · Tolerance-meaning you might need to take more of a medication for the same pain relief · Physical dependence-meaning you have symptoms of withdrawal when the medication is stopped. Withdrawal symptoms can include nausea, sweating, chills, diarrhea, stomach cramps, and muscle aches. Withdrawal can last up to several weeks, depending on which drug you took and how long you took it. · Increased sensitivity to pain · Constipation · Nausea, vomiting, and dry mouth · Sleepiness and dizziness · Confusion · Depression · Low levels of testosterone that can result in lower sex drive, energy, and strength · Itching and sweating RISKS ARE GREATER WITH:      
· History of drug misuse, substance use disorder, or overdose · Mental health conditions (such as depression or anxiety) · Sleep apnea · Older age (72 years or older) · Pregnancy Avoid alcohol while taking prescription opioids. Also, unless specifically advised by your health care provider, medications to avoid include: · Benzodiazepines (such as Xanax or Valium) · Muscle relaxants (such as Soma or Flexeril) · Hypnotics (such as Ambien or Lunesta) · Other prescription opioids KNOW YOUR OPTIONS Talk to your health care provider about ways to manage your pain that don't involve prescription opioids. Some of these options may actually work better and have fewer risks and side effects. Consult your physician before adding or stopping any medications, treatments, or physical activity. Options may include: 
· Pain relievers such as acetaminophen, ibuprofen, and naproxen · Some medications that are also used for depression or seizures · Physical therapy and exercise · Counseling to help patients learn how to cope better with triggers of pain and stress. · Application of heat or cold compress · Massage therapy · Relaxation techniques Be Informed Make sure you know the name of your medication, how much and how often to take it, and its potential risks & side effects. IF YOU ARE PRESCRIBED OPIOIDS FOR PAIN: 
· Never take opioids in greater amounts or more often than prescribed. Remember the goal is not to be pain-free but to manage your pain at a tolerable level. · Follow up with your primary care provider to: · Work together to create a plan on how to manage your pain. · Talk about ways to help manage your pain that don't involve prescription opioids. · Talk about any and all concerns and side effects. · Help prevent misuse and abuse. · Never sell or share prescription opioids · Help prevent misuse and abuse. · Store prescription opioids in a secure place and out of reach of others (this may include visitors, children, friends, and family). · Safely dispose of unused/unwanted prescription opioids: Find your community drug take-back program or your pharmacy mail-back program, or flush them down the toilet, following guidance from the Food and Drug Administration (www.fda.gov/Drugs/ResourcesForYou). · Visit www.cdc.gov/drugoverdose to learn about the risks of opioid abuse and overdose. · If you believe you may be struggling with addiction, tell your health care provider and ask for guidance or call [facility] [employer] at 3-187-137-NNYA. Discharge Instructions Activity: Activity as tolerated Diet: Diabetic Diet Learning About Hypoxemia What is hypoxemia? Hypoxemia means that you don't have enough oxygen in your blood. It's a result of diseases that affect your heart or lungs. These include heart failure, COPD, and pulmonary fibrosis (scarring of the lungs). Being at high altitudes can also lead to hypoxemia. What happens when you have hypoxemia? Oxygen gets into your blood through your lungs. Your blood carries the oxygen to all parts of your body. When you have too little oxygen in your blood, your body doesn't get enough of it. With too little oxygen, your heart and other parts of your body don't work very well. What are the symptoms? In addition to the symptoms of whatever is causing your hypoxemia, you may: · Get tired quickly. · Be short of breath when you are active. · Feel like your heart is pounding or racing. · Feel weak or dizzy. · Become confused. How is hypoxemia treated? Your doctor will do tests to find out how much oxygen is in your blood. He or she will look for the cause of your hypoxemia and treat that problem. For example, if you have heart failure, you may need medicines that help your heart pump better. · If your hypoxemia is not severe, your doctor may give you oxygen through a mask or nasal cannula (say \"RAJIV-renee-fidel\"). A cannula is a thin tube with two openings that fit just inside your nose. · If your hypoxemia is severe, you may have a breathing tube put into your windpipe. The breathing tube is attached to a machine that pushes air into your lungs. This machine is called a ventilator. · If you have a long-term problem with hypoxemia, your doctor may recommend that you use oxygen regularly. Some people need it all the time. Others need it from time to time throughout the day or overnight. Your doctor will tell you how much oxygen you need and how often to use it. Follow-up care is a key part of your treatment and safety. Be sure to make and go to all appointments, and call your doctor if you are having problems. It's also a good idea to know your test results and keep a list of the medicines you take. Where can you learn more? Go to http://philip-gloria.info/. Enter M375 in the search box to learn more about \"Learning About Hypoxemia. \" Current as of: December 6, 2017 Content Version: 11.8 © 8974-9810 [employer]. Care instructions adapted under license by [employer] (which disclaims liability or warranty for this information). If you have questions about a medical condition or this instruction, always ask your healthcare professional. [address] any warranty or liability for your use of this information. Oxygen Therapy: Care Instructions Your Care Instructions Oxygen therapy helps you get more oxygen into your lungs and bloodstream. You may use it if you have a disease that makes it hard to breathe, such as COPD, pulmonary fibrosis (scarring of the lungs), or heart failure. Oxygen therapy can make it easier for you to breathe and can reduce your heart's workload. Some people need extra oxygen all the time. Others need it from time to time throughout the day or overnight. A doctor will prescribe how much oxygen you need and how often to use it. To breathe the oxygen, most people use a nasal cannula (say \"RAJIV-yuh-fidel\"). This is a thin tube with two prongs that fit just inside your nose. People who need a lot of oxygen may need to use a mask that fits over the nose and mouth. Follow-up care is a key part of your treatment and safety. Be sure to make and go to all appointments, and call your doctor if you are having problems. It's also a good idea to know your test results and keep a list of the medicines you take. How can you care for yourself at home? To help yourself · Using oxygen may dry out your nose or lips. Use water-based lubricants on your lips or nostrils. Do not use an oil-based product like petroleum jelly. · If you use a nasal cannula, the tubing may rub under your nostrils and around your ears. To keep your skin from getting sore, tuck some gauze under the tubing. Use a water-based lotion on rubbed areas. · Do not use alcohol or take drugs that relax you, because they will slow your breathing rate. · Keep track of how much oxygen is in the tank, and reorder before it runs out. If a holiday is coming up or you expect bad weather, order in advance or make your regular order larger. · You may need extra oxygen when you travel to high altitudes or travel by plane. Ask your doctor about this. · If you are getting oxygen directly to your windpipe through an opening in your neck, your doctor will teach you how to care for the equipment. To make sure oxygen is flowing · Check the flow by holding your mask or cannula up to your ear and listening for the \"hiss\" of airflow. · If you have a nasal cannula, dip the prongs in a glass of water. If you see bubbles, oxygen is coming through. · Check your pressure gauge or contents indicator. · If you use an oxygen concentrator, make sure it is turned on and plugged in. If you use a cylinder, make sure the valve is open. · Look for kinks, blockages, or water in the tubing. Be sure the tubing is connected to the oxygen source. · Do not change your oxygen flow rate. Your doctor sets this at the correct level. Higher flow rates usually do not help and can increase the risk of harmful carbon dioxide buildup in the blood. To be safe · Do not leave cords or tubing running across an area where you or someone else may trip on it. · Do not let oxygen containers get hot. Store them in a cool place where there is airflow. Do not leave them in a car trunk or a hot vehicle. · Keep oxygen containers upright. Make sure they do not fall over and get damaged. Try securing the tanks in a sturdy container or securing them with a rope or a chain. · Watch for signs of oxygen leaks. If you hear a loud hissing from your container or if it empties too fast, stay away from the container. Open windows right away and call the company that brought the oxygen system to your home. · Do not use oxygen around anything that could spark or easily cause a fire. ¨ Do not smoke or let others smoke while you are using oxygen. Put up \"no smoking\" signs in your home. ¨ Do not use oxygen near open flames, such as candles, fireplaces, gas stoves, or hot water heaters. Do not use it near electric razors, hair dryers, heating pads, or anything that may spark. ¨ Keep a working fire extinguisher in your home where it is easy to get to. ¨ If a fire starts, turn off the oxygen right away and leave the house. ¨ If you have an oxygen concentrator, do not use it if the cord looks damaged. Do not use an extension cord to plug it in. Do not plug it into an outlet that has other appliances plugged into it. To care for the equipment · Follow the directions that come with the equipment for using and caring for it. · Wash your cannula or mask with a liquid soap and warm water 1 or 2 times a week. Replace them every 2 to 4 weeks. · If you have a cold, change the nasal prongs when your cold symptoms are done. · If you have an oxygen concentrator, unplug the unit and wipe down the cabinet with a damp cloth daily. Clean the air filter at least 2 times a week. Where can you learn more? Go to http://philip-gloria.info/. Enter E117 in the search box to learn more about \"Oxygen Therapy: Care Instructions. \" Current as of: December 6, 2017 Content Version: 11.8 © 4452-2594 [employer]. Care instructions adapted under license by [employer] (which disclaims liability or warranty for this information). If you have questions about a medical condition or this instruction, always ask your healthcare professional. [place] [address] any warranty or liability for your use of this information. DISCHARGE SUMMARY from Nurse PATIENT INSTRUCTIONS: 
 
 
F-face looks uneven A-arms unable to move or move unevenly S-speech slurred or non-existent T-time-call 911 as soon as signs and symptoms begin-DO NOT go Back to bed or wait to see if you get better-TIME IS BRAIN. Warning Signs of HEART ATTACK Call 911 if you have these symptoms: 
? Chest discomfort.  Most heart attacks involve discomfort in the center of the chest that lasts more than a few minutes, or that goes away and comes back. It can feel like uncomfortable pressure, squeezing, fullness, or pain. ? Discomfort in other areas of the upper body. Symptoms can include pain or discomfort in one or both arms, the back, neck, jaw, or stomach. ? Shortness of breath with or without chest discomfort. ? Other signs may include breaking out in a cold sweat, nausea, or lightheadedness. Don't wait more than five minutes to call [address]! Fast action can save your life. Calling 911 is almost always the fastest way to get lifesaving treatment. Emergency Medical Services staff can begin treatment when they arrive  up to an hour sooner than if someone gets to the hospital by car. The discharge information has been reviewed with the patient. The patient verbalized understanding. Discharge medications reviewed with the patient and appropriate educational materials and side effects teaching were provided. ___________________________________________________________________________________________________________________________________ ACO Transitions of Care Introducing [facility] offers a voluntary care coordination program to provide high quality service and care to [facility] fee-for-service beneficiaries. Melvin Rodriguezorro was designed to help you enhance your health and well-being through the following services: ? Transitions of Care  support for individuals who are transitioning from one care setting to another (example: Hospital to home). ? Chronic and Complex Care Coordination  support for individuals and caregivers of those with serious or chronic illnesses or with more than one chronic (ongoing) condition and those who take a number of different medications.   
 
 
If you meet specific medical criteria, a [address] Coordinator may call you directly to coordinate your care with your primary care physician and your other care providers. For questions about the [facility] programs, please, contact your physicians office. For general questions or additional information about Accountable Care Organizations: 
Please visit www.medicare.gov/acos. html or call 1-800-MEDICARE (5-185.134.9990) TTY users should call 0-933.851.3268. [employer] Announcement We are excited to announce that we are making your provider's discharge notes available to you in Above [employer]. You will see these notes when they are completed and signed by the physician that discharged you from your recent hospital stay. If you have any questions or concerns about any information you see in Above [employer], please call the Health Information Department where you were seen or reach out to your Primary Care Provider for more information about your plan of care. Introducing [facility]! Dear Fredi Barrera: Thank you for requesting a [employer] account. Our records indicate that you already have an active [employer] account. You can access your account anytime at [email or web address] Did you know that you can access your hospital and ER discharge instructions at any time in Above Security? You can also review all of your test results from your hospital stay or ER visit. Additional Information If you have questions, please visit the Frequently Asked Questions section of the [employer] website at [email or web address] Remember, Above [employer] is NOT to be used for urgent needs. For medical emergencies, dial 911. Now available from your iPhone and Android! Introducing Zain Dangelo As a [employer] patient, I wanted to make you aware of our electronic visit tool called Zain Dangelo. [employer] 24/7 allows you to connect within minutes with a medical provider 24 hours a day, seven days a week via a mobile device or tablet or logging into a secure website from your computer. You can access [employer] from anywhere in the [place]. A virtual visit might be right for you when you have a simple condition and feel like you just dont want to get out of bed, or cant get away from work for an appointment, when your regular [address] [place] [address] provider is not available (evenings, weekends or holidays), or when youre out of town and need minor care. Electronic visits cost only $49 and if the [address] [place] [address] 24/7 provider determines a prescription is needed to treat your condition, one can be electronically transmitted to a nearby pharmacy*. Please take a moment to enroll today if you have not already done so. The enrollment process is free and takes just a few minutes. To enroll, please download the "[employer] [facility] 24/7 david to your tablet or phone, or visit [email or web address] to enroll on your computer. And, as an [address] [place] [address] patient with a [employer] account, the results of your visits will be scanned into your electronic medical record and your primary care provider will be able to view the scanned results. We urge you to continue to see your regular [address] [place] [address] provider for your ongoing medical care. And while your primary care provider may not be the one available when you seek a Zain Kanuzo virtual visit, the peace of mind you get from getting a real diagnosis real time can be priceless. For more information on [employer], view our Frequently Asked Questions (FAQs) at [email or web address]. Sincerely, 
 
Lindsay Fairbanks MD 
Chief Medical Officer Aubrey Licona *:  certain medications cannot be prescribed via Zain [facility] Providers Seen During Your Hospitalization Provider Specialty Primary office phone Zhen Craig MD Orthopedic Surgery [phone] Collier Duverney, [address] [place] [address] Internal Medicine [phone] Immunizations Administered for This Admission Name Date Influenza Vaccine (Quad) PF 10/12/2018 Your Primary Care Physician (PCP) Primary Care Physician Office Phone Office Fax Paula Domínguez [phone] You are allergic to the following No active allergies Recent Documentation Height Weight BMI OB Status Smoking Status 1.651 m 101.7 kg 37.31 kg/m2 Postmenopausal Never Smoker Emergency Contacts Name Discharge Info Relation Home Work Mobile Leonid Snow DISCHARGE CAREGIVER [3] Spouse [3] [phone] Patient Belongings The following personal items are in your possession at time of discharge: 
  Dental Appliances: Uppers, Partials  Visual Aid: Glasses      Home Medications: None   Jewelry: None  Clothing: Shirt, Pants, Footwear, Undergarments    Other Valuables: Eyeglasses Please provide this summary of care documentation to your next provider. Signatures-by signing, you are acknowledging that this After Visit Summary has been reviewed with you and you have received a copy. Patient Signature:  ____________________________________________________________ Date:  ____________________________________________________________  
  
[facility] Provider Signature:  ____________________________________________________________ Date:  ____________________________________________________________

## 2018-10-11 NOTE — IP AVS SNAPSHOT
[address] 
[phone] Patient: Marian Clancy MRN: [id number] NTP:[id number]/3/5635 A check flavia indicates which time of day the medication should be taken. My Medications CONTINUE taking these medications Instructions Each Dose to Equal  
 Morning Noon Evening Bedtime  
 amLODIPine 10 mg tablet Commonly known as:  Ortiz Pee Your last dose was: This morning  10/12 Your next dose is:  Resume home schedule Take 10 mg by mouth. 10 mg  
    
   
   
   
  
 cholecalciferol (VITAMIN D3) 5,000 unit Tab tablet Commonly known as:  VITAMIN D3 Your next dose is:  Tomorrow morning Take  by mouth daily. cpap machine kit  
   
 by Does Not Apply route. 10 cm with O2 @@ 4 lpm  
     
   
   
   
  
 cyanocobalamin (vitamin B-12) 2,000 mcg Tab Your next dose is:  Resume home schedule Take  by mouth. HORIZANT 600 mg Tber Generic drug:  gabapentin enacarbil Your next dose is:  Resume home schedule Take  by mouth.  
     
   
   
   
  
 levothyroxine 50 mcg tablet Commonly known as:  SYNTHROID Your last dose was: This morning  10/12 Your next dose is:  Tomorrow morning Take 50 mcg by mouth Daily (before breakfast). 50 mcg  
    
  
   
   
   
  
 losartan 100 mg tablet Commonly known as:  COZAAR Your last dose was: This morning  10/12 Your next dose is:  Tomorrow morning Take 100 mg by mouth daily. 100 mg  
    
  
   
   
   
  
 lovastatin 40 mg tablet Commonly known as:  MEVACOR Your next dose is: Take tonight Take 40 mg by mouth nightly. 40 mg  
    
   
   
   
  
  
 metFORMIN 500 mg tablet Commonly known as:  GLUCOPHAGE Your next dose is:  Tomorrow morning 10/13 Take 1,000 mg by mouth two (2) times daily (with meals).   
 1000 mg  
    
  
   
   
  
   
  
 multivitamin tablet Commonly known as:  ONE A DAY Your next dose is:  Tomorrow morning Take 1 Tab by mouth daily. 1 Tab  
    
  
   
   
   
  
 nadolol 80 mg tablet Commonly known as:  CORGARD Your last dose was: Last night 10/11 Your next dose is: Take tonight Take 80 mg by mouth nightly. 80 mg  
    
   
   
   
  
  
 omeprazole 20 mg capsule Commonly known as:  PRILOSEC Your next dose is:  Tomorrow morning 10/13 Take 20 mg by mouth two (2) times a day. 20 mg  
    
  
   
   
  
   
  
 oxybutynin 5 mg tablet Commonly known as:  KQUSKIBA Your last dose was: This evening  10/12 Your next dose is:  Tomorrow morning 10/13 Take 5 mg by mouth two (2) times a day. 5 mg  
    
  
   
   
  
   
  
 oxyCODONE IR 5 mg immediate release tablet Commonly known as:  Crow Llamas Your last dose was: This evening at 6 PM  
Your next dose is: Take today if needed after 2 AM  
   
 TAKE 1 TABLET BY MOUTH EVERY 8 HOURS FOR 30 DAYS PARoxetine 20 mg tablet Commonly known as:  PAXIL Your last dose was: This morning  10/12 Your next dose is:  Tomorrow morning Take 20 mg by mouth every morning. 20 mg  
    
  
   
   
   
  
 zolpidem 10 mg tablet Commonly known as:  AMBIEN Your next dose is: Take tonight if needed Take 15 mg by mouth nightly as needed.   
 15 mg

## 2018-10-11 NOTE — PERIOP NOTES
Dr. Stephanie Danielle notified of low oxygen saturation in preop. Orders received for CBC and chest xray. Radiology notified.

## 2018-10-11 NOTE — PROGRESS NOTES
Problem: Falls - Risk of 
Goal: *Absence of Falls Document Sunitha Bass Fall Risk and appropriate interventions in the flowsheet. Outcome: Progressing Towards Goal 
Fall Risk Interventions: 
Mobility Interventions: Bed/chair exit alarm, OT consult for ADLs, Patient to call before getting OOB, PT Consult for mobility concerns, PT Consult for assist device competence, Strengthening exercises (ROM-active/passive), Utilize walker, cane, or other assistive device Medication Interventions: Assess postural VS orthostatic hypotension, Bed/chair exit alarm, Patient to call before getting OOB, Teach patient to arise slowly

## 2018-10-11 NOTE — CONSULTS
CONSULT NOTE    Marilyn Csah    10/11/2018    Date of Admission:  10/11/2018    The patient's chart is reviewed and the patient is discussed with the staff. Subjective:     Patient is a 68 y.o.  female seen and evaluated at the request of Dr. Kareem Russo. The patient has previously been seen in [facility] Pulmonary office, last in 2015. She had HARRELL at that time with workup only revealing restriction on spirometry felt to be coming from obesity and grade 2 diastolic dysfunction. She declined CT chest at that time and did not qualify for O2 during the day. Since that time it seems that her breathing had been stable until about 6 months ago. At that time she developed severe back pain that led to her being nearly bed bound. She is able to get up for only brief periods of time and requires a walker to help her with balance. She has been noticing that she is more SOB with any exertion and gets very fatigued with profuse sweating with any exertion. She denies chest pain, palpitations, wheeze, or LE edema. She does have some cough but clearly relates this to episodes of reflux. She came in today for spinal stimulator placement but was noted to be hypoxic requiring 3L O2 to maintain sats. She was admitted by the hospitalist service and her procedure delayed. Pulmonary is consulted to help with w/u of her hypoxia. Review of Systems  A comprehensive review of systems was negative except for that written in the HPI.     Patient Active Problem List   Diagnosis Code    Diabetes mellitus ([facility] [address] E11.9    Obesity E66.9    Hypoxemia R09.02    Obstructive sleep apnea G47.33    Pulmonary hypertension (HCC) I27.20    Restless leg syndrome G25.81    Neuropathy G62.9    Chest pain R07.9    Dyspnea R06.00    Abdominal pain R10.9    Anemia D64.9    Lumbosacral neuritis M54.17    Chronic pain syndrome G89.4    Acute respiratory failure with hypoxia (HCC) J96.01    Leukocytosis D72.829  Urinary frequency R35.0       Prior to Admission Medications   Prescriptions Last Dose Informant Patient Reported? Taking? PARoxetine (PAXIL) 20 mg tablet 10/10/2018 at Unknown time  Yes Yes   Sig: Take 20 mg by mouth every morning. amLODIPine (NORVASC) 10 mg tablet 10/10/2018 at Unknown time  Yes Yes   Sig: Take 10 mg by mouth. cholecalciferol, VITAMIN D3, (VITAMIN D3) 5,000 unit tab tablet 10/4/2018 at Unknown time  Yes Yes   Sig: Take  by mouth daily. cpap machine kit Not Taking at Unknown time  Yes No   Sig: by Does Not Apply route. 10 cm with O2 @@ 4 lpm   cyanocobalamin, vitamin B-12, 2,000 mcg tab 10/4/2018 at Unknown time  Yes Yes   Sig: Take  by mouth.   gabapentin enacarbil (HORIZANT) 600 mg TbER 10/4/2018 at Unknown time  Yes Yes   Sig: Take  by mouth.   levothyroxine (SYNTHROID) 50 mcg tablet 10/10/2018 at Unknown time  Yes Yes   Sig: Take 50 mcg by mouth Daily (before breakfast). losartan (COZAAR) 100 mg tablet 10/10/2018 at Unknown time  Yes Yes   Sig: Take 100 mg by mouth daily. lovastatin (MEVACOR) 40 mg tablet 10/10/2018 at Unknown time  Yes Yes   Sig: Take 40 mg by mouth nightly. metFORMIN (GLUCOPHAGE) 500 mg tablet 10/10/2018 at Unknown time  Yes Yes   Sig: Take 1,000 mg by mouth two (2) times daily (with meals). multivitamin (ONE A DAY) tablet 10/4/2018 at Unknown time  Yes Yes   Sig: Take 1 Tab by mouth daily. nadolol (CORGARD) 80 mg tablet 10/10/2018 at 1930  Yes Yes   Sig: Take 80 mg by mouth nightly. omeprazole (PRILOSEC) 20 mg capsule 10/10/2018 at Unknown time  Yes Yes   Sig: Take 20 mg by mouth two (2) times a day. oxyCODONE IR (ROXICODONE) 5 mg immediate release tablet 10/10/2018 at Unknown time  Yes Yes   Sig: TAKE 1 TABLET BY MOUTH EVERY 8 HOURS FOR 30 DAYS   oxybutynin (DITROPAN) 5 mg tablet 10/10/2018 at Unknown time  Yes Yes   Sig: Take 5 mg by mouth two (2) times a day.    zolpidem (AMBIEN) 10 mg tablet 10/10/2018 at Unknown time  Yes Yes   Sig: Take 15 mg by mouth nightly as needed. Facility-Administered Medications: None       Past Medical History:   Diagnosis Date    Anxiety and depression     since back problems--- well managed with medication    Arthritis     OA hands    Chronic pain     OA    Diabetes mellitus, type 2 ([facility] [address]     oral agents: checks BG 1X weekly avereage 100; A1C 6.5 (7/14/17); denies hypo    GERD (gastroesophageal reflux disease)     omeprazole- denies hiatal hernia- uses 2-3 extra pillows to prevent reflux- states daily reflux up throat     Hypertension     medication controlled    LALA on CPAP     does not wear CPAP    PONV (postoperative nausea and vomiting)     states IV zofran relieves    Pulmonary nodule     stable, no longer monitored    Thyroid disease     hypo     Past Surgical History:   Procedure Laterality Date    COLONOSCOPY N/A 7/27/2017    COLONOSCOPY  BMI 41 performed by Christine Lackey MD at [facility] ENDOSCOPY    HX [address] Right 1988    HX KNEE ARTHROSCOPY  2008    Left knee partial replacement    HX SHOULDER ARTHROSCOPY  1989    Rotator cuff     Social History     Social History    Marital status:      Spouse name: N/A    Number of children: N/A    Years of education: N/A     Occupational History    Not on file. Social History Main Topics    Smoking status: Never Smoker    Smokeless tobacco: Never Used    Alcohol use No    Drug use: No    Sexual activity: Not on file     Other Topics Concern    Not on file     Social History Narrative     Family History   Problem Relation Age of Onset    Cancer Mother      endometrial    Diabetes Brother     Heart Disease Brother     Hypertension Brother     Diabetes Brother     Hypertension Brother     Heart Disease Brother      No Known Allergies    Current Facility-Administered Medications   Medication Dose Route Frequency    [START ON 10/12/2018] amLODIPine (NORVASC) tablet 10 mg  10 mg Oral DAILY    . PHARMACY TO SUBSTITUTE PER PROTOCOL (Reordered from: gabapentin enacarbil (HORIZANT) 600 mg TbER)    Per Protocol    [START ON 10/12/2018] levothyroxine (SYNTHROID) tablet 50 mcg  50 mcg Oral ACB    [START ON 10/12/2018] losartan (COZAAR) tablet 100 mg  100 mg Oral DAILY    simvastatin (ZOCOR) tablet 20 mg  20 mg Oral QHS    [START ON 10/12/2018] multivitamin, tx-iron-ca-min (THERA-M w/ IRON) tablet 1 Tab  1 Tab Oral DAILY    nadolol (CORGARD) tablet 80 mg  80 mg Oral QHS    [START ON 10/12/2018] pantoprazole (PROTONIX) tablet 40 mg  40 mg Oral ACB    oxybutynin (DITROPAN) tablet 5 mg  5 mg Oral BID    oxyCODONE IR (ROXICODONE) tablet 5 mg  5 mg Oral Q4H PRN    [START ON 10/12/2018] PARoxetine (PAXIL) tablet 20 mg  20 mg Oral 7am    zolpidem (AMBIEN) tablet 5 mg  5 mg Oral QHS PRN    sodium chloride (NS) flush 5-10 mL  5-10 mL IntraVENous Q8H    sodium chloride (NS) flush 5-10 mL  5-10 mL IntraVENous PRN    ondansetron (ZOFRAN) injection 4 mg  4 mg IntraVENous Q4H PRN    heparin (porcine) injection 5,000 Units  5,000 Units SubCUTAneous Q8H    hydrALAZINE (APRESOLINE) 20 mg/mL injection 20 mg  20 mg IntraVENous Q6H PRN    influenza vaccine 2018-19 (6 mos+)(PF) (FLUARIX QUAD/FLULAVAL QUAD) injection 0.5 mL  0.5 mL IntraMUSCular PRIOR TO DISCHARGE         Objective:     Vitals:    10/11/18 1332 10/11/18 1420 10/11/18 1607 10/11/18 1616   BP:    (!) 166/92   Pulse:  69 67 70   Resp:  20 22 18   Temp:    98.5 °F (36.9 °C)   SpO2: 91% (!) 89% 92% 95%   Weight:       Height:           PHYSICAL EXAM     Constitutional:  the patient is well developed and in no acute distress  EENMT:  Sclera clear, pupils equal, oral mucosa moist  Respiratory: CTA B, no w/r/r  Cardiovascular:  RRR without M,G,R  Gastrointestinal: soft and non-tender; with positive bowel sounds. Musculoskeletal: warm without cyanosis. There is no lower leg edema.   Skin:  no jaundice or rashes, no wounds   Neurologic: no gross neuro deficits     Psychiatric:  alert and oriented x ppt    CXR:    10/11/18  IMPRESSION: No evidence of acute cardiopulmonary disease. Recent Labs      10/11/18   1153   WBC  11.3*   HGB  12.3   HCT  38.2   PLT  340     No results for input(s): NA, K, CL, GLU, CO2, BUN, CREA, MG, PHOS, CA, TROIQ, ALB, TBIL, TBILI, GPT, ALT, SGOT, BNPP in the last 72 hours. No lab exists for component: TROIP  No results for input(s): PH, PCO2, PO2, HCO3 in the last 72 hours. No results for input(s): LCAD, LAC in the last 72 hours. Assessment:  (Medical Decision Making)     Hospital Problems  Date Reviewed: 10/11/2018          Codes Class Noted POA    Lumbosacral neuritis ICD-10-CM: M54.17  ICD-9-CM: 724.4  10/11/2018 Yes        Chronic pain syndrome ICD-10-CM: G89.4  ICD-9-CM: 338.4  10/11/2018 Yes        * (Principal)Acute respiratory failure with hypoxia ([facility] [address] ICD-10-CM: J96.01  ICD-9-CM: 518.81  10/11/2018 Yes        Leukocytosis ICD-10-CM: M04.558  ICD-9-CM: 288.60  10/11/2018 Yes        Urinary frequency ICD-10-CM: R35.0  ICD-9-CM: 788.41  10/11/2018 Yes        Diabetes mellitus (HCC) (Chronic) ICD-10-CM: E11.9  ICD-9-CM: 250.00  8/29/2013 Yes        Obesity (Chronic) ICD-10-CM: E66.9  ICD-9-CM: 278.00  8/29/2013 Yes        Obstructive sleep apnea (Chronic) ICD-10-CM: G47.33  ICD-9-CM: 327.23  8/29/2013 Yes            Acute respiratory failure with hypoxia of unclear cause. Past spirometry without obstruction. Restriction felt to be due to body habitus but never had CT chest at her request.   Also had grade 2 diastolic dysfunction which may be contributing. May have pulmonary hypertension due to the above. Has untreated LALA due to CPAP intolerance which could also contribute to [place]. Finally, at risk for PE given her 6 months of relative immobility. D dimer mildly elevated. Plan:  (Medical Decision Making)     --TTE already ordered. Follow up results. --CT PE ordered. This will help rule out PE as well as other subtle pulmonary pathology.    --will have her try her home CPAP tonight and ask RT to help trouble shoot to make sure there is no easy change that can be made to help her tolerate it better. If not she will need to be plugged in to the sleep clinic as her new PCP does not do sleep medicine (last sleep study years ago in [facility]). -wean O2 based on sats and will need ambulating sat check prior to discharge for any new home O2 qualification. More than 50% of the time documented was spent in face-to-face contact with the patient and in the care of the patient on the floor/unit where the patient is located. Thank you very much for this referral.  We appreciate the opportunity to participate in this patient's care. Will follow along with above stated plan.     Charlotte Guidry MD

## 2018-10-11 NOTE — PERIOP NOTES
Notified Dr. Benson Dose of O2 sat 90-91% on 3L nasal canula post albuterol treatment. Patient complaint of back pain and requested to have [facility] lowered. O2 sat dropped to mid 80s. Encouraged patient to have head of bed elevated. Awaiting decision from Dr. Benson Dose.

## 2018-10-11 NOTE — PROGRESS NOTES
10/11/18 1616 Dual Skin Pressure Injury Assessment Dual Skin Pressure Injury Assessment WDL Second Care Provider (Based on [address] [place] [address]) Jonathan Lee RN Skin Integumentary Skin Integumentary (WDL) WDL Wound Prevention and Protection Methods Orientation of Wound Prevention Posterior Location of Wound Prevention Sacrum/Coccyx Dressing Present  No  
Wound Offloading (Prevention Methods) Bed, pressure reduction mattress;Pillows;Repositioning

## 2018-10-11 NOTE — H&P
HOSPITALIST H&P 
 
 
NAME:  Kyle Christensen Age:  68 y.o. 
:   1945 MRN:   [id number] PCP: Rodrigo Muhammad MD 
 
Attending MD: Juliana Poon DO Treatment Team: Attending Provider: Jamel Noland MD 
 
HPI:  
 
Kyle Christensen is a 68year old female with a PMH of NIDDM2, HTN, HLD, LALA, and chronic low back pain who presented to pre-op for a spinal stimulator placement and she was found to have an oxygen saturations of 83%-87% on room air. She was placed on oxygen and required 3LNC to come up to >90%. She states that she's never been known to be hypoxic during the day, but she is supposed to wear a CPAP at night, which she has not been doing recently due to getting a new machine which is too strong for her. She states that she gets bronchitis 1-2 times yearly \"for years\" but has not recently had any SOB or cough. She denies F/C/N/V. Denies CP. Denies LE edema. She does admit to urinary frequency over the past few days. Complete ROS done and is as stated in HPI or otherwise negative Past Medical History:  
Diagnosis Date  Anxiety and depression   
 since back problems--- well managed with medication  Arthritis OA hands  Chronic pain OA  Diabetes mellitus, type 2 ([facility] [address] oral agents: checks BG 1X weekly avereage 100; A1C 6.5 (17); denies hypo  GERD (gastroesophageal reflux disease) omeprazole- denies hiatal hernia- uses 2-3 extra pillows to prevent reflux- states daily reflux up throat  Hypertension   
 medication controlled  LALA on CPAP   
 does not wear CPAP  
 PONV (postoperative nausea and vomiting) states IV zofran relieves  Pulmonary nodule   
 stable, no longer monitored  Thyroid disease   
 hypo Past Surgical History:  
Procedure Laterality Date  COLONOSCOPY N/A 2017 COLONOSCOPY  BMI 41 performed by Allegra Villalpando MD at [facility] Right   HX KNEE ARTHROSCOPY  2008 Left knee partial replacement  HX SHOULDER ARTHROSCOPY  1989 Rotator cuff Prior to Admission Medications Prescriptions Last Dose Informant Patient Reported? Taking? PARoxetine (PAXIL) 20 mg tablet 10/10/2018 at Unknown time  Yes Yes Sig: Take 20 mg by mouth every morning. amLODIPine (NORVASC) 10 mg tablet 10/10/2018 at Unknown time  Yes Yes Sig: Take 10 mg by mouth. cholecalciferol, VITAMIN D3, (VITAMIN D3) 5,000 unit tab tablet 10/4/2018 at Unknown time  Yes Yes Sig: Take  by mouth daily. cpap machine kit Not Taking at Unknown time  Yes No  
Sig: by Does Not Apply route. 10 cm with O2 @@ 4 lpm  
cyanocobalamin, vitamin B-12, 2,000 mcg tab 10/4/2018 at Unknown time  Yes Yes Sig: Take  by mouth.  
gabapentin enacarbil (HORIZANT) 600 mg TbER 10/4/2018 at Unknown time  Yes Yes Sig: Take  by mouth.  
levothyroxine (SYNTHROID) 50 mcg tablet 10/10/2018 at Unknown time  Yes Yes Sig: Take 50 mcg by mouth Daily (before breakfast). losartan (COZAAR) 100 mg tablet 10/10/2018 at Unknown time  Yes Yes Sig: Take 100 mg by mouth daily. lovastatin (MEVACOR) 40 mg tablet 10/10/2018 at Unknown time  Yes Yes Sig: Take 40 mg by mouth nightly. metFORMIN (GLUCOPHAGE) 500 mg tablet 10/10/2018 at Unknown time  Yes Yes Sig: Take 1,000 mg by mouth two (2) times daily (with meals). multivitamin (ONE A DAY) tablet 10/4/2018 at Unknown time  Yes Yes Sig: Take 1 Tab by mouth daily. nadolol (CORGARD) 80 mg tablet 10/10/2018 at 1930  Yes Yes Sig: Take 80 mg by mouth nightly. omeprazole (PRILOSEC) 20 mg capsule 10/10/2018 at Unknown time  Yes Yes Sig: Take 20 mg by mouth two (2) times a day. oxyCODONE IR (ROXICODONE) 5 mg immediate release tablet 10/10/2018 at Unknown time  Yes Yes Sig: TAKE 1 TABLET BY MOUTH EVERY 8 HOURS FOR 30 DAYS  
oxybutynin (DITROPAN) 5 mg tablet 10/10/2018 at Unknown time  Yes Yes Sig: Take 5 mg by mouth two (2) times a day. zolpidem (AMBIEN) 10 mg tablet 10/10/2018 at Unknown time  Yes Yes Sig: Take 15 mg by mouth nightly as needed. Facility-Administered Medications: None No Known Allergies Social History Substance Use Topics  Smoking status: Never Smoker  Smokeless tobacco: Never Used  Alcohol use No  
  
 
Family History Problem Relation Age of Onset  Cancer Mother   
  endometrial  
 Diabetes Brother  Heart Disease Brother  Hypertension Brother  Diabetes Brother  Hypertension Brother  Heart Disease Brother Objective:  
 
 
Visit Vitals  /73 (BP 1 Location: Right arm, BP Patient Position: At rest)  Pulse 69  Temp 98.7 °F (37.1 °C)  Resp 16  
 Ht 5' 5\" (1.651 m)  Wt 101.7 kg (224 lb 3.2 oz)  SpO2 91%  BMI 37.31 kg/m2 Temp (24hrs), Av.7 °F (37.1 °C), Min:98.7 °F (37.1 °C), Max:98.7 °F (37.1 °C) Oxygen Therapy O2 Sat (%): 91 % (10/11/18 1332) Pulse via Oximetry: 66 beats per minute (10/11/18 1309) O2 Device: Nasal cannula (10/11/18 1332) O2 Flow Rate (L/min): 3 l/min (10/11/18 1332) Physical Exam: 
 
General:    Alert, cooperative, no distress, appears stated age. Eyes:   PERRLA EOMI Anicteric Head:   Normocephalic, without obvious abnormality, atraumatic. ENT:  Nares normal. No drainage. Lungs:   Clear to auscultation bilaterally. No Wheezing or Rhonchi. No rales. Heart:   Regular rate and rhythm,  no murmur, rub or gallop. No JVD. Abdomen:   Soft, non-tender. Not distended. Bowel sounds normal.  
MSK:  No edema. No clubbing or cyanosis. No deformities/lesions. Skin:     Texture, turgor normal. No rashes or lesions. No Jaundice. Neurologic: Alert and oriented x 3, no focal deficits Psychiatry:      No depression/anxiety. Mood congruent for context. Heme/Lymph/Immune: No echymoses or overt signs of bleeding. Lab/Data Review: 
Recent Results (from the past 24 hour(s)) GLUCOSE, POC  
 Collection Time: 10/11/18 11:12 AM  
Result Value Ref Range Glucose (POC) 134 (H) 65 - 100 mg/dL CBC W/O DIFF Collection Time: 10/11/18 11:53 AM  
Result Value Ref Range WBC 11.3 (H) 4.3 - 11.1 K/uL  
 RBC 4.50 4.05 - 5.2 M/uL  
 HGB 12.3 11.7 - 15.4 g/dL HCT 38.2 35.8 - 46.3 % MCV 84.9 79.6 - 97.8 FL  
 MCH 27.3 26.1 - 32.9 PG  
 MCHC 32.2 31.4 - 35.0 g/dL  
 RDW 14.0 % PLATELET 240 051 - 612 K/uL MPV 9.5 9.4 - 12.3 FL ABSOLUTE NRBC 0.00 0.0 - 0.2 K/uL Imaging: Xr Chest [facility] Result Date: 10/11/2018 IMPRESSION: No evidence of acute cardiopulmonary disease. Lesley Rotunda Cultures: All Micro Results None Assessment/Plan:  
 
Principal Problem: 
  Acute respiratory failure with hypoxia ([facility] [address] (10/11/2018) - Patient's oxygen dropped as low as 83% on RA and required 3LNC to get to >90% 
- Denies SOB/cough - CXR unremarkable - Check D-Dimer - Check ECHO 
- Consult Pulmonary to assist 
 
Active Problems: 
  Leukocytosis (10/11/2018) - Mildly elevated - CXR unremarkable - Check UA 
- Repeat CBC in AM 
 
  Urinary frequency (10/11/2018) - Has had it over past few days - Check UA to r/o UTI Diabetes mellitus ([facility] [address] (8/29/2013) - Stable - Hemoglobin 6.8 
- Hold Metformin - Add Humalog SSI Obstructive sleep apnea (8/29/2013) - Per patient, new CPAP is too strong and therefore she doesn't wear it 
- Continue Pulmonary for assistance and possible out patient follow up Lumbosacral neuritis (10/11/2018) - Was supposed to get spinal stimulator on 10/11 Chronic pain syndrome (10/11/2018) - Continue home Roxicodone - Orthopedics to see for follow up Obesity (8/29/2013) Code Status: FULL CODE 
DVT Prophylaxis: Heparin Anticipated discharge: 3 days Gabby Cash DO 
3:44 PM

## 2018-10-11 NOTE — PERIOP NOTES
Dr. Des Kaufman at bedside to discuss plan of care with patient due to elevated WBC and low O2 saturation.

## 2018-10-11 NOTE — ANESTHESIA PREPROCEDURE EVALUATION
Anesthetic History PONV Review of Systems / Medical History Patient summary reviewed and pertinent labs reviewed Pulmonary Sleep apnea: No treatment Neuro/Psych Psychiatric history (Anxiety and depression) Cardiovascular Hypertension: well controlled Exercise tolerance: >4 METS Comments: Denies CP, SOB or changes in functional status GI/Hepatic/Renal 
  
GERD: well controlled Endo/Other Diabetes: well controlled, type 2 Hypothyroidism: well controlled Morbid obesity and arthritis Other Findings Physical Exam 
 
Airway Mallampati: II 
TM Distance: 4 - 6 cm Neck ROM: normal range of motion Mouth opening: Normal 
 
 Cardiovascular Rhythm: regular Rate: normal 
 
 
 
 Dental 
 
Dentition: Upper partial plate and Lower partial plate Pulmonary Breath sounds clear to auscultation Abdominal 
GI exam deferred Other Findings Anesthetic Plan ASA: 3 Anesthesia type: general 
 
 
 
 
Induction: Intravenous Anesthetic plan and risks discussed with: Patient and Spouse

## 2018-10-11 NOTE — PROGRESS NOTES
Report given to Daniel Chaudhary RN on 7th floor. Allowed time for questions. Transported to 724 via stretcher.

## 2018-10-12 ENCOUNTER — APPOINTMENT (OUTPATIENT)
Dept: CT IMAGING | Age: 73
DRG: 189 | End: 2018-10-12
Attending: INTERNAL MEDICINE
Payer: MEDICARE

## 2018-10-12 VITALS
TEMPERATURE: 98.3 F | HEART RATE: 60 BPM | HEIGHT: 65 IN | DIASTOLIC BLOOD PRESSURE: 66 MMHG | WEIGHT: 224.2 LBS | RESPIRATION RATE: 20 BRPM | SYSTOLIC BLOOD PRESSURE: 106 MMHG | BODY MASS INDEX: 37.36 KG/M2 | OXYGEN SATURATION: 91 %

## 2018-10-12 PROBLEM — I35.0 MODERATE AORTIC STENOSIS: Status: ACTIVE | Noted: 2018-10-12

## 2018-10-12 LAB
ANION GAP SERPL CALC-SCNC: 10 MMOL/L (ref 7–16)
ARTERIAL PATENCY WRIST A: YES
BASE EXCESS BLD CALC-SCNC: 1 MMOL/L
BASOPHILS # BLD: 0.1 K/UL (ref 0–0.2)
BASOPHILS NFR BLD: 1 % (ref 0–2)
BDY SITE: ABNORMAL
BODY TEMPERATURE: 98.6
BUN SERPL-MCNC: 11 MG/DL (ref 8–23)
CALCIUM SERPL-MCNC: 9 MG/DL (ref 8.3–10.4)
CHLORIDE SERPL-SCNC: 103 MMOL/L (ref 98–107)
CO2 BLD-SCNC: 27 MMOL/L
CO2 SERPL-SCNC: 25 MMOL/L (ref 21–32)
CREAT SERPL-MCNC: 0.63 MG/DL (ref 0.6–1)
DIFFERENTIAL METHOD BLD: NORMAL
EOSINOPHIL # BLD: 0.5 K/UL (ref 0–0.8)
EOSINOPHIL NFR BLD: 4 % (ref 0.5–7.8)
ERYTHROCYTE [DISTWIDTH] IN BLOOD BY AUTOMATED COUNT: 14.1 %
GAS FLOW.O2 O2 DELIVERY SYS: ABNORMAL L/MIN
GLUCOSE BLD STRIP.AUTO-MCNC: 105 MG/DL (ref 65–100)
GLUCOSE BLD STRIP.AUTO-MCNC: 107 MG/DL (ref 65–100)
GLUCOSE BLD STRIP.AUTO-MCNC: 128 MG/DL (ref 65–100)
GLUCOSE BLD STRIP.AUTO-MCNC: 171 MG/DL (ref 65–100)
GLUCOSE SERPL-MCNC: 113 MG/DL (ref 65–100)
HCO3 BLD-SCNC: 25.9 MMOL/L (ref 22–26)
HCT VFR BLD AUTO: 37.8 % (ref 35.8–46.3)
HGB BLD-MCNC: 12 G/DL (ref 11.7–15.4)
IMM GRANULOCYTES # BLD: 0.1 K/UL (ref 0–0.5)
IMM GRANULOCYTES NFR BLD AUTO: 1 % (ref 0–5)
LYMPHOCYTES # BLD: 3.2 K/UL (ref 0.5–4.6)
LYMPHOCYTES NFR BLD: 29 % (ref 13–44)
MCH RBC QN AUTO: 27.8 PG (ref 26.1–32.9)
MCHC RBC AUTO-ENTMCNC: 31.7 G/DL (ref 31.4–35)
MCV RBC AUTO: 87.5 FL (ref 79.6–97.8)
MONOCYTES # BLD: 1.1 K/UL (ref 0.1–1.3)
MONOCYTES NFR BLD: 10 % (ref 4–12)
NEUTS SEG # BLD: 6.3 K/UL (ref 1.7–8.2)
NEUTS SEG NFR BLD: 57 % (ref 43–78)
NRBC # BLD: 0 K/UL (ref 0–0.2)
O2/TOTAL GAS SETTING VFR VENT: 21 %
PCO2 BLD: 41.8 MMHG (ref 35–45)
PH BLD: 7.4 [PH] (ref 7.35–7.45)
PLATELET # BLD AUTO: 352 K/UL (ref 150–450)
PMV BLD AUTO: 9.7 FL (ref 9.4–12.3)
PO2 BLD: 41 MMHG (ref 80–105)
POTASSIUM SERPL-SCNC: 3.2 MMOL/L (ref 3.5–5.1)
RBC # BLD AUTO: 4.32 M/UL (ref 4.05–5.2)
SAO2 % BLD: 76 % (ref 95–98)
SERVICE CMNT-IMP: ABNORMAL
SODIUM SERPL-SCNC: 138 MMOL/L (ref 136–145)
SPECIMEN TYPE: ABNORMAL
WBC # BLD AUTO: 11.1 K/UL (ref 4.3–11.1)

## 2018-10-12 PROCEDURE — 74011250636 HC RX REV CODE- 250/636: Performed by: INTERNAL MEDICINE

## 2018-10-12 PROCEDURE — 94760 N-INVAS EAR/PLS OXIMETRY 1: CPT

## 2018-10-12 PROCEDURE — 82962 GLUCOSE BLOOD TEST: CPT

## 2018-10-12 PROCEDURE — 74011250637 HC RX REV CODE- 250/637: Performed by: INTERNAL MEDICINE

## 2018-10-12 PROCEDURE — 90471 IMMUNIZATION ADMIN: CPT

## 2018-10-12 PROCEDURE — 71260 CT THORAX DX C+: CPT

## 2018-10-12 PROCEDURE — 77030021668 HC NEB PREFIL KT VYRM -A

## 2018-10-12 PROCEDURE — 94761 N-INVAS EAR/PLS OXIMETRY MLT: CPT

## 2018-10-12 PROCEDURE — 36600 WITHDRAWAL OF ARTERIAL BLOOD: CPT

## 2018-10-12 PROCEDURE — 74011000258 HC RX REV CODE- 258: Performed by: INTERNAL MEDICINE

## 2018-10-12 PROCEDURE — 36415 COLL VENOUS BLD VENIPUNCTURE: CPT

## 2018-10-12 PROCEDURE — 80048 BASIC METABOLIC PNL TOTAL CA: CPT

## 2018-10-12 PROCEDURE — 77010033678 HC OXYGEN DAILY

## 2018-10-12 PROCEDURE — 74011000250 HC RX REV CODE- 250: Performed by: INTERNAL MEDICINE

## 2018-10-12 PROCEDURE — C8929 TTE W OR WO FOL WCON,DOPPLER: HCPCS

## 2018-10-12 PROCEDURE — 74011636320 HC RX REV CODE- 636/320: Performed by: INTERNAL MEDICINE

## 2018-10-12 PROCEDURE — 82803 BLOOD GASES ANY COMBINATION: CPT

## 2018-10-12 PROCEDURE — 85025 COMPLETE CBC W/AUTO DIFF WBC: CPT

## 2018-10-12 PROCEDURE — 90686 IIV4 VACC NO PRSV 0.5 ML IM: CPT | Performed by: INTERNAL MEDICINE

## 2018-10-12 PROCEDURE — 99232 SBSQ HOSP IP/OBS MODERATE 35: CPT | Performed by: INTERNAL MEDICINE

## 2018-10-12 RX ORDER — SODIUM CHLORIDE 0.9 % (FLUSH) 0.9 %
10 SYRINGE (ML) INJECTION
Status: COMPLETED | OUTPATIENT
Start: 2018-10-12 | End: 2018-10-12

## 2018-10-12 RX ADMIN — OXYCODONE HYDROCHLORIDE 5 MG: 5 TABLET ORAL at 04:14

## 2018-10-12 RX ADMIN — LOSARTAN POTASSIUM 100 MG: 50 TABLET ORAL at 09:30

## 2018-10-12 RX ADMIN — OXYBUTYNIN CHLORIDE 5 MG: 5 TABLET ORAL at 18:15

## 2018-10-12 RX ADMIN — Medication 10 ML: at 04:16

## 2018-10-12 RX ADMIN — HEPARIN SODIUM 5000 UNITS: 5000 INJECTION INTRAVENOUS; SUBCUTANEOUS at 18:16

## 2018-10-12 RX ADMIN — SODIUM CHLORIDE 100 ML: 900 INJECTION, SOLUTION INTRAVENOUS at 11:06

## 2018-10-12 RX ADMIN — HEPARIN SODIUM 5000 UNITS: 5000 INJECTION INTRAVENOUS; SUBCUTANEOUS at 09:29

## 2018-10-12 RX ADMIN — OXYBUTYNIN CHLORIDE 5 MG: 5 TABLET ORAL at 09:30

## 2018-10-12 RX ADMIN — PERFLUTREN 1 ML: 6.52 INJECTION, SUSPENSION INTRAVENOUS at 09:00

## 2018-10-12 RX ADMIN — OXYCODONE HYDROCHLORIDE 5 MG: 5 TABLET ORAL at 18:15

## 2018-10-12 RX ADMIN — INFLUENZA VIRUS VACCINE 0.5 ML: 15; 15; 15; 15 SUSPENSION INTRAMUSCULAR at 18:17

## 2018-10-12 RX ADMIN — LEVOTHYROXINE SODIUM 50 MCG: 50 TABLET ORAL at 04:14

## 2018-10-12 RX ADMIN — PANTOPRAZOLE SODIUM 40 MG: 40 TABLET, DELAYED RELEASE ORAL at 04:13

## 2018-10-12 RX ADMIN — PAROXETINE HYDROCHLORIDE 20 MG: 20 TABLET, FILM COATED ORAL at 04:12

## 2018-10-12 RX ADMIN — Medication 10 ML: at 11:06

## 2018-10-12 RX ADMIN — AMLODIPINE BESYLATE 10 MG: 10 TABLET ORAL at 09:29

## 2018-10-12 RX ADMIN — OXYCODONE HYDROCHLORIDE 5 MG: 5 TABLET ORAL at 09:40

## 2018-10-12 RX ADMIN — HEPARIN SODIUM 5000 UNITS: 5000 INJECTION INTRAVENOUS; SUBCUTANEOUS at 00:54

## 2018-10-12 RX ADMIN — IOPAMIDOL 100 ML: 755 INJECTION, SOLUTION INTRAVENOUS at 11:06

## 2018-10-12 NOTE — PROGRESS NOTES
Order, oxygen qualifier and pertinent paperwork faxed to [facility] for delivery of oxygen to room prior to discharge.

## 2018-10-12 NOTE — DISCHARGE INSTRUCTIONS
Activity: Activity as tolerated    Diet: Diabetic Diet     Learning About Hypoxemia  What is hypoxemia? Hypoxemia means that you don't have enough oxygen in your blood. It's a result of diseases that affect your heart or lungs. These include heart failure, COPD, and pulmonary fibrosis (scarring of the lungs). Being at high altitudes can also lead to hypoxemia. What happens when you have hypoxemia? Oxygen gets into your blood through your lungs. Your blood carries the oxygen to all parts of your body. When you have too little oxygen in your blood, your body doesn't get enough of it. With too little oxygen, your heart and other parts of your body don't work very well. What are the symptoms? In addition to the symptoms of whatever is causing your hypoxemia, you may:  · Get tired quickly. · Be short of breath when you are active. · Feel like your heart is pounding or racing. · Feel weak or dizzy. · Become confused. How is hypoxemia treated? Your doctor will do tests to find out how much oxygen is in your blood. He or she will look for the cause of your hypoxemia and treat that problem. For example, if you have heart failure, you may need medicines that help your heart pump better. · If your hypoxemia is not severe, your doctor may give you oxygen through a mask or nasal cannula (say \"RAJIV-allanh-fidel\"). A cannula is a thin tube with two openings that fit just inside your nose. · If your hypoxemia is severe, you may have a breathing tube put into your windpipe. The breathing tube is attached to a machine that pushes air into your lungs. This machine is called a ventilator. · If you have a long-term problem with hypoxemia, your doctor may recommend that you use oxygen regularly. Some people need it all the time. Others need it from time to time throughout the day or overnight. Your doctor will tell you how much oxygen you need and how often to use it. Follow-up care is a key part of your treatment and safety. Be sure to make and go to all appointments, and call your doctor if you are having problems. It's also a good idea to know your test results and keep a list of the medicines you take. Where can you learn more? Go to http://philip-gloria.info/. Enter M375 in the search box to learn more about \"Learning About Hypoxemia. \"  Current as of: December 6, 2017  Content Version: 11.8  © 8549-8030 [employer]. Care instructions adapted under license by [employer] (which disclaims liability or warranty for this information). If you have questions about a medical condition or this instruction, always ask your healthcare professional. [address] any warranty or liability for your use of this information. Oxygen Therapy: Care Instructions  Your Care Instructions    Oxygen therapy helps you get more oxygen into your lungs and bloodstream. You may use it if you have a disease that makes it hard to breathe, such as COPD, pulmonary fibrosis (scarring of the lungs), or heart failure. Oxygen therapy can make it easier for you to breathe and can reduce your heart's workload. Some people need extra oxygen all the time. Others need it from time to time throughout the day or overnight. A doctor will prescribe how much oxygen you need and how often to use it. To breathe the oxygen, most people use a nasal cannula (say \"RAJIV-yuh-fidel\"). This is a thin tube with two prongs that fit just inside your nose. People who need a lot of oxygen may need to use a mask that fits over the nose and mouth. Follow-up care is a key part of your treatment and safety. Be sure to make and go to all appointments, and call your doctor if you are having problems. It's also a good idea to know your test results and keep a list of the medicines you take. How can you care for yourself at home? To help yourself  · Using oxygen may dry out your nose or lips.  Use water-based lubricants on your lips or nostrils. Do not use an oil-based product like petroleum jelly. · If you use a nasal cannula, the tubing may rub under your nostrils and around your ears. To keep your skin from getting sore, tuck some gauze under the tubing. Use a water-based lotion on rubbed areas. · Do not use alcohol or take drugs that relax you, because they will slow your breathing rate. · Keep track of how much oxygen is in the tank, and reorder before it runs out. If a holiday is coming up or you expect bad weather, order in advance or make your regular order larger. · You may need extra oxygen when you travel to high altitudes or travel by plane. Ask your doctor about this. · If you are getting oxygen directly to your windpipe through an opening in your neck, your doctor will teach you how to care for the equipment. To make sure oxygen is flowing  · Check the flow by holding your mask or cannula up to your ear and listening for the \"hiss\" of airflow. · If you have a nasal cannula, dip the prongs in a glass of water. If you see bubbles, oxygen is coming through. · Check your pressure gauge or contents indicator. · If you use an oxygen concentrator, make sure it is turned on and plugged in. If you use a cylinder, make sure the valve is open. · Look for kinks, blockages, or water in the tubing. Be sure the tubing is connected to the oxygen source. · Do not change your oxygen flow rate. Your doctor sets this at the correct level. Higher flow rates usually do not help and can increase the risk of harmful carbon dioxide buildup in the blood. To be safe  · Do not leave cords or tubing running across an area where you or someone else may trip on it. · Do not let oxygen containers get hot. Store them in a cool place where there is airflow. Do not leave them in a car trunk or a hot vehicle. · Keep oxygen containers upright. Make sure they do not fall over and get damaged.  Try securing the tanks in a sturdy container or securing them with a rope or a chain. · Watch for signs of oxygen leaks. If you hear a loud hissing from your container or if it empties too fast, stay away from the container. Open windows right away and call the company that brought the oxygen system to your home. · Do not use oxygen around anything that could spark or easily cause a fire. ¨ Do not smoke or let others smoke while you are using oxygen. Put up \"no smoking\" signs in your home. ¨ Do not use oxygen near open flames, such as candles, fireplaces, gas stoves, or hot water heaters. Do not use it near electric razors, hair dryers, heating pads, or anything that may spark. ¨ Keep a working fire extinguisher in your home where it is easy to get to. ¨ If a fire starts, turn off the oxygen right away and leave the house. ¨ If you have an oxygen concentrator, do not use it if the cord looks damaged. Do not use an extension cord to plug it in. Do not plug it into an outlet that has other appliances plugged into it. To care for the equipment  · Follow the directions that come with the equipment for using and caring for it. · Wash your cannula or mask with a liquid soap and warm water 1 or 2 times a week. Replace them every 2 to 4 weeks. · If you have a cold, change the nasal prongs when your cold symptoms are done. · If you have an oxygen concentrator, unplug the unit and wipe down the cabinet with a damp cloth daily. Clean the air filter at least 2 times a week. Where can you learn more? Go to http://philip-gloria.info/. Enter E117 in the search box to learn more about \"Oxygen Therapy: Care Instructions. \"  Current as of: December 6, 2017  Content Version: 11.8  © 2861-2577 [employer]. Care instructions adapted under license by [employer] (which disclaims liability or warranty for this information).  If you have questions about a medical condition or this instruction, always ask your healthcare professional. [employer] disclaims any warranty or liability for your use of this information. DISCHARGE SUMMARY from Nurse    PATIENT INSTRUCTIONS:    After general anesthesia or intravenous sedation, for 24 hours or while taking prescription Narcotics:  · Limit your activities  · Do not drive and operate hazardous machinery  · Do not make important personal or business decisions  · Do  not drink alcoholic beverages  · If you have not urinated within 8 hours after discharge, please contact your surgeon on call. Report the following to your surgeon:  · Excessive pain, swelling, redness or odor of or around the surgical area  · Temperature over 100.5  · Nausea and vomiting lasting longer than 4 hours or if unable to take medications  · Any signs of decreased circulation or nerve impairment to extremity: change in color, persistent  numbness, tingling, coldness or increase pain  · Any questions    What to do at Home:  Recommended activity: Activity as tolerated,     If you experience any of the following symptoms see discharge instructions please follow up with [facility]. *  Please give a list of your current medications to your Primary Care Provider. *  Please update this list whenever your medications are discontinued, doses are      changed, or new medications (including over-the-counter products) are added. *  Please carry medication information at all times in case of emergency situations. These are general instructions for a healthy lifestyle:    No smoking/ No tobacco products/ Avoid exposure to second hand smoke  Surgeon General's Warning:  Quitting smoking now greatly reduces serious risk to your health.     Obesity, smoking, and sedentary lifestyle greatly increases your risk for illness    A healthy diet, regular physical exercise & weight monitoring are important for maintaining a healthy lifestyle    You may be retaining fluid if you have a history of heart failure or if you experience any of the following symptoms:  Weight gain of 3 pounds or more overnight or 5 pounds in a week, increased swelling in our hands or feet or shortness of breath while lying flat in bed. Please call your doctor as soon as you notice any of these symptoms; do not wait until your next office visit. Recognize signs and symptoms of STROKE:    F-face looks uneven    A-arms unable to move or move unevenly    S-speech slurred or non-existent    T-time-call 911 as soon as signs and symptoms begin-DO NOT go       Back to bed or wait to see if you get better-TIME IS BRAIN. Warning Signs of HEART ATTACK     Call 911 if you have these symptoms:   Chest discomfort. Most heart attacks involve discomfort in the center of the chest that lasts more than a few minutes, or that goes away and comes back. It can feel like uncomfortable pressure, squeezing, fullness, or pain.  Discomfort in other areas of the upper body. Symptoms can include pain or discomfort in one or both arms, the back, neck, jaw, or stomach.  Shortness of breath with or without chest discomfort.  Other signs may include breaking out in a cold sweat, nausea, or lightheadedness. Don't wait more than five minutes to call 911 - MINUTES MATTER! Fast action can save your life. Calling 911 is almost always the fastest way to get lifesaving treatment. Emergency Medical Services staff can begin treatment when they arrive -- up to an hour sooner than if someone gets to the hospital by car. The discharge information has been reviewed with the patient. The patient verbalized understanding. Discharge medications reviewed with the patient and appropriate educational materials and side effects teaching were provided.   ___________________________________________________________________________________________________________________________________

## 2018-10-12 NOTE — PROGRESS NOTES
Upon entering room, Patient was on 5L NC. Room air ABG ordered. Patient taken off oxygen for 5 minutes to draw ABG. Patient SpO2 dropped to 78%, SaO2 76% on ABG. Patient immediately placed back on 5L NC, SpO2 recovered quickly to 93%.

## 2018-10-12 NOTE — PROGRESS NOTES
Rajan Roberts Admission Date: 10/11/2018 Daily Progress Note: 10/12/2018 The patient's chart is reviewed and the patient is discussed with the staff.  68 y.o.  female seen and evaluated at the request of Dr. Shahida Tom. The patient has previously been seen in [facility] Pul[facility] office, last in 2015. She had HARRELL at that time with workup only revealing restriction on spirometry felt to be coming from obesity and grade 2 diastolic dysfunction. She declined CT chest at that time and did not qualify for O2 during the day.  
  
Since that time it seems that her breathing had been stable until about 6 months ago. At that time she developed severe back pain that led to her being nearly bed bound. She is able to get up for only brief periods of time and requires a walker to help her with balance. She has been noticing that she is more SOB with any exertion and gets very fatigued with profuse sweating with any exertion. She denies chest pain, palpitations, wheeze, or LE edema. She does have some cough but clearly relates this to episodes of reflux.  
  
She was now admitted for spinal stimulator placement but was noted to be hypoxic requiring 3L O2 to maintain sats. She was admitted by the hospitalist service and her procedure delayed. Pulmonary is consulted to help with w/u of her hypoxia. Subjective: She denies any dyspnea. Slept with hospital CPAP last night. Cannot tolerate her home machine as the pressure is too high. Current Facility-Administered Medications Medication Dose Route Frequency  perflutren lipid microspheres (DEFINITY) in NS bolus IV  1 mL IntraVENous PRN  
 amLODIPine (NORVASC) tablet 10 mg  10 mg Oral DAILY  gabapentin enacarbil (HORIZANT) TbER 600 mg (Patient Supplied)  600 mg Oral DAILY WITH DINNER  
 levothyroxine (SYNTHROID) tablet 50 mcg  50 mcg Oral ACB  losartan (COZAAR) tablet 100 mg  100 mg Oral DAILY  simvastatin (ZOCOR) tablet 20 mg  20 mg Oral QHS  multivitamin, tx-iron-ca-min (THERA-M w/ IRON) tablet 1 Tab  1 Tab Oral DAILY  nadolol (CORGARD) tablet 80 mg  80 mg Oral QHS  pantoprazole (PROTONIX) tablet 40 mg  40 mg Oral ACB  oxybutynin (DITROPAN) tablet 5 mg  5 mg Oral BID  
 oxyCODONE IR (ROXICODONE) tablet 5 mg  5 mg Oral Q4H PRN  
 PARoxetine (PAXIL) tablet 20 mg  20 mg Oral 7am  
 zolpidem (AMBIEN) tablet 5 mg  5 mg Oral QHS PRN  
 sodium chloride (NS) flush 5-10 mL  5-10 mL IntraVENous Q8H  
 sodium chloride (NS) flush 5-10 mL  5-10 mL IntraVENous PRN  
 ondansetron (ZOFRAN) injection 4 mg  4 mg IntraVENous Q4H PRN  
 heparin (porcine) injection 5,000 Units  5,000 Units SubCUTAneous Q8H  
 hydrALAZINE (APRESOLINE) 20 mg/mL injection 20 mg  20 mg IntraVENous Q6H PRN  
 influenza vaccine 2018-19 (6 mos+)(PF) (FLUARIX QUAD/FLULAVAL QUAD) injection 0.5 mL  0.5 mL IntraMUSCular PRIOR TO DISCHARGE Objective:  
 
Vitals:  
 10/11/18 2219 10/11/18 2239 10/12/18 0354 10/12/18 1877 BP: 127/71  129/75 113/76 Pulse: 63  63 80 Resp: 18  18 18 Temp: 98.3 °F (36.8 °C)  98 °F (36.7 °C) 98.3 °F (36.8 °C) SpO2: 90% 90% 98% 98% Weight:      
Height:      
 
Intake and Output:  
  
  
 
Physical Exam:  
Constitutional:  the patient is well developed and in no acute distress HEENT:  Sclera clear, pupils equal, oral mucosa moist 
Lungs: clear bilaterally. Currently wearing 3 liter cannula Cardiovascular:  RRR without M,G,R 
Abd/GI: soft and non-tender; with positive bowel sounds. Ext: warm without cyanosis. There is no lower leg edema. Musculoskeletal: moves all four extremities with equal strength Skin:  no jaundice or rashes, no wounds Neuro: no gross neuro deficits Musculoskeletal: can ambulate. No deformity Psychiatric: Calm. ROS:  Chronic back pain/immobility, denies dyspnea Lines: peripheral IV 
 
CHEST XRAY:   
 
 
LAB Recent Labs 10/12/18 
 0431  10/11/18 John Villavicencio 105 WBC  11.1  11.3* HGB  12.0  12.3 HCT  37.8  38.2 PLT  352  340 Recent Labs 10/12/18 
 0431 NA  138  
K  3.2*  
CL  103 CO2  25 GLU  113* BUN  11  
CREA  0.63 Assessment:  (Medical Decision Making) Hospital Problems  Date Reviewed: 10/11/2018 Codes Class Noted POA Lumbosacral neuritis ICD-10-CM: M54.17 ICD-9-CM: 724.4  10/11/2018 Yes Here for a spinal stimulator for chronic back pain Chronic pain syndrome ICD-10-CM: G89.4 ICD-9-CM: 338.4  10/11/2018 Yes As above * (Principal)Acute respiratory failure with hypoxia ([facility] [address] ICD-10-CM: J96.01 
ICD-9-CM: 518.81  10/11/2018 Yes Now on 3 liters oxygen - no dyspnea. Workup in progress Leukocytosis ICD-10-CM: E26.851 ICD-9-CM: 288.60  10/11/2018 Yes  
 mild Urinary frequency ICD-10-CM: R35.0 ICD-9-CM: 788.41  10/11/2018 Yes  
 chronic Diabetes mellitus (HCC) (Chronic) ICD-10-CM: E11.9 ICD-9-CM: 250.00  8/29/2013 Yes  
 controlled Obesity (Chronic) ICD-10-CM: B99.2 ICD-9-CM: 278.00  8/29/2013 Yes Contributes to the complexity of care Obstructive sleep apnea (Chronic) ICD-10-CM: G47.33 
ICD-9-CM: 327.23  8/29/2013 Yes Has home CPAP but not using as the pressure is \"too high\" Plan:  (Medical Decision Making) 1. Contacted [facility] and her home CPAP is to be set on autoset - 5 to 10 cm. I have asked RT to look at the machine to be sure the settings are correct but [facility] said settings are correct based on wireless communication. RT to resume use of home machine when patient can tolerate - they will look at mask to be sure fit is good. 2. Review CT and echo - ? Etiology of hypoxia. ? Pulmonary hypertension. May need to check overnight oximetry with CPAP to see is she needs oxygen support at home. Check ABG now. 3. Spine stimulator placement on hold for now pending workup of hypoxia Kandis Williamson, NP 
 
 More than 50% of time documented was spent face-to-face contact with the patient and in the care of the patient on the floor/unit where the patient is located. Lungs:   Clear with decreased breath sounds Heart:  RRR with no Murmur/Rubs/Gallops Additional Comments: Will check ra abg,  Ct report pending but I do not see obvious pe. May need home O2 I have spoken with and examined the patient. I agree with the above assessment and plan as documented.  
 
Katarina Ricks MD

## 2018-10-12 NOTE — PROGRESS NOTES
Problem: Falls - Risk of 
Goal: *Absence of Falls Document Callylillie  Fall Risk and appropriate interventions in the flowsheet. Outcome: Progressing Towards Goal 
Fall Risk Interventions: 
Mobility Interventions: Communicate number of staff needed for ambulation/transfer Medication Interventions: Assess postural VS orthostatic hypotension

## 2018-10-12 NOTE — PROGRESS NOTES
Patient will require continuous oxygen at home. Referral sent to [facility] to obtain oxygen qualifier. CM will continue to follow.

## 2018-10-12 NOTE — DISCHARGE SUMMARY
Hospitalist Discharge Summary     Patient ID:  Shruti Delacruz  [id number]  38 y.o.  1945  Admit date: 10/11/2018 10:23 AM  Discharge date and time: 10/12/2018  Attending: Kinza Damico DO  PCP:  Adarsh Milan MD  Treatment Team: Attending Provider: Kinza Damico DO; Consulting Provider: Shiv Akins MD; Consulting Provider: Sunny Campa MD; Charge Nurse: Lonny Phalen; Utilization Review: Santamaria Stoddard; Care Manager: Ruby Montero RN    Principal Diagnosis Acute respiratory failure with hypoxia [facility])    Hospital Problems as of 10/12/2018  Date Reviewed: 10/11/2018          Codes Class Noted - Resolved POA    * (Principal)Acute respiratory failure with hypoxia ([facility] [address] ICD-10-CM: J96.01  ICD-9-CM: 518.81  10/11/2018 - Present Yes        Leukocytosis ICD-10-CM: D72.829  ICD-9-CM: 288.60  10/11/2018 - Present Yes        Urinary frequency ICD-10-CM: R35.0  ICD-9-CM: 788.41  10/11/2018 - Present Yes        Moderate aortic stenosis ICD-10-CM: I35.0  ICD-9-CM: 424.1  10/12/2018 - Present Yes        Diabetes mellitus ([facility] [address] (Chronic) ICD-10-CM: E11.9  ICD-9-CM: 250.00  8/29/2013 - Present Yes        Obstructive sleep apnea (Chronic) ICD-10-CM: G47.33  ICD-9-CM: 327.23  8/29/2013 - Present Yes        Lumbosacral neuritis ICD-10-CM: M54.17  ICD-9-CM: 724.4  10/11/2018 - Present Yes        Chronic pain syndrome ICD-10-CM: G89.4  ICD-9-CM: 338.4  10/11/2018 - Present Yes        Obesity (Chronic) ICD-10-CM: E66.9  ICD-9-CM: 278.00  8/29/2013 - Present Yes              From H&P: \" 68year old female with a PMH of NIDDM2, HTN, HLD, LALA, and chronic low back pain who presented to pre-op for a spinal stimulator placement and she was found to have an oxygen saturations of 83%-87% on room air. She was placed on oxygen and required 3LNC to come up to >90%.  She states that she's never been known to be hypoxic during the day, but she is supposed to wear a CPAP at night, which she has not been doing recently due to getting a new machine which is too strong for her. She states that she gets bronchitis 1-2 times yearly \"for years\" but has not recently had any SOB or cough. She denies F/C/N/V. Denies CP. Denies LE edema. She does admit to urinary frequency over the past few days. \"    Hospital Course: The patient was admitted to the hospital and she was requiring 3LNC. Pulmonary evaluated her and helped to get her CPAP adjusted. A CT of the chest was done which showed scarring, but nothing acute. ECHO showed moderate AS, but otherwise unremarkable. An ABG on room air revealed a pO2 of 41. It was decided this was likely and acute on chronic issue so a home oxygen eval was done. She was discharged home in stable condition with oxygen. Significant Diagnostic Studies:    Labs: Results:       Chemistry Recent Labs      10/12/18   0431   GLU  113*   NA  138   K  3.2*   CL  103   CO2  25   BUN  11   CREA  0.63   CA  9.0   AGAP  10      CBC w/Diff Recent Labs      10/12/18   0431  10/11/18   1153   WBC  11.1  11.3*   RBC  4.32  4.50   HGB  12.0  12.3   HCT  37.8  38.2   PLT  352  340   GRANS  57   --    LYMPH  29   --    EOS  4   --       Cardiac Enzymes No results for input(s): CPK, CKND1, LAURENCE in the last 72 hours. No lab exists for component: CKRMB, TROIP   Coagulation No results for input(s): PTP, INR, APTT in the last 72 hours. No lab exists for component: INREXT    Lipid Panel No results found for: CHOL, CHOLPOCT, CHOLX, CHLST, CHOLV, 213367, HDL, LDL, LDLC, DLDLP, 565703, VLDLC, VLDL, TGLX, TRIGL, TRIGP, TGLPOCT, CHHD, CHHDX   BNP No results for input(s): BNPP in the last 72 hours. Liver Enzymes No results for input(s): TP, ALB, TBIL, AP, SGOT, GPT in the last 72 hours. No lab exists for component: DBIL   Thyroid Studies No results found for: T4, T3U, TSH, TSHEXT         Imaging:  Ct Chest W Cont    Result Date: 10/12/2018  IMPRESSION: No evidence of pulmonary embolism.  Scattered scarring throughout the lungs without evidence of a focal pneumonia. 0.5 cm right lower lobe subpleural nodule, fortunately routine follow-up is recommended. Healed fractures of the lateral right seventh, eighth, and ninth ribs. VOICE DICTATED BY: Dr. Cecelia Vera    Xr Chest Port    Result Date: 10/11/2018  IMPRESSION: No evidence of acute cardiopulmonary disease. .       Microbiology/Cultures: All Micro Results     None          Discharge Exam:  Visit Vitals    /75 (BP 1 Location: Right arm, BP Patient Position: At rest)    Pulse 65    Temp 98.9 °F (37.2 °C)    Resp 20    Ht 5' 5\" (1.651 m)    Wt 101.7 kg (224 lb 3.2 oz)    SpO2 92%    BMI 37.31 kg/m2     General appearance: alert, cooperative, no distress, appears stated age  Lungs: clear to auscultation bilaterally  Heart: regular rate and rhythm, S1, S2 normal, no murmur, click, rub or gallop  Abdomen: soft, non-tender. Bowel sounds normal. No masses,  no organomegaly  Extremities: no cyanosis or edema  Neurologic: Grossly normal    Disposition: home  Discharge Condition: stable  Patient Instructions:   Current Discharge Medication List      CONTINUE these medications which have NOT CHANGED    Details   levothyroxine (SYNTHROID) 50 mcg tablet Take 50 mcg by mouth Daily (before breakfast). omeprazole (PRILOSEC) 20 mg capsule Take 20 mg by mouth two (2) times a day. cyanocobalamin, vitamin B-12, 2,000 mcg tab Take  by mouth. cholecalciferol, VITAMIN D3, (VITAMIN D3) 5,000 unit tab tablet Take  by mouth daily. oxyCODONE IR (ROXICODONE) 5 mg immediate release tablet TAKE 1 TABLET BY MOUTH EVERY 8 HOURS FOR 30 DAYS  Refills: 0      amLODIPine (NORVASC) 10 mg tablet Take 10 mg by mouth.      gabapentin enacarbil (HORIZANT) 600 mg TbER Take  by mouth.      multivitamin (ONE A DAY) tablet Take 1 Tab by mouth daily. losartan (COZAAR) 100 mg tablet Take 100 mg by mouth daily.       nadolol (CORGARD) 80 mg tablet Take 80 mg by mouth nightly. metFORMIN (GLUCOPHAGE) 500 mg tablet Take 1,000 mg by mouth two (2) times daily (with meals). PARoxetine (PAXIL) 20 mg tablet Take 20 mg by mouth every morning. oxybutynin (DITROPAN) 5 mg tablet Take 5 mg by mouth two (2) times a day. lovastatin (MEVACOR) 40 mg tablet Take 40 mg by mouth nightly. zolpidem (AMBIEN) 10 mg tablet Take 15 mg by mouth nightly as needed. cpap machine kit by Does Not Apply route.  10 cm with O2 @@ 4 lpm             Activity: Activity as tolerated  Diet: Diabetic Diet  Wound Care: None needed    Follow-up  ·   Dr. Sammi Raymond in one week  · Dr. Don Fernandes in one month  · Dr. Allyssa Petersen in one month  Time spent to discharge patient 35 minutes  Signed:  Odilon Lozoya DO  10/12/2018  6:28 PM

## 2018-10-12 NOTE — PROGRESS NOTES
Reviewed notes Will follow as needed [place], staff [address], [address] [facility] [place]  /   [email or web address]

## 2018-10-12 NOTE — PROGRESS NOTES
Problem: Interdisciplinary Rounds Goal: Interdisciplinary Rounds Outcome: Progressing Towards Goal 
Interdisciplinary team rounds were held 10/12/2018 with the following team members:Care Management, Physical Therapy, Physician and  and the patient. Plan of care discussed. See clinical pathway and/or care plan for interventions and desired outcomes.

## 2018-10-12 NOTE — PROGRESS NOTES
Discharge instructions  all new medications,medication side effects sheet, follow up appointment  reviewed and explained to the patient. Patient verbalizes understanding of instructions. A copy of discharge instructions   have been given to patient. Opportunity for questions provided. Patient to be discharged when oxygen is delivered

## 2018-10-12 NOTE — PROGRESS NOTES
Oxygen Qualifier Room air: SpO2 with O2 and liter flow Resting SpO2  77%  82% on 1L, 85% on 2L, 90% on 3L Ambulating SpO2   87% on 3L, 91% on 4L Completed by: 
 
Aydee Garber, RT

## 2018-10-15 ENCOUNTER — PATIENT OUTREACH (OUTPATIENT)
Dept: CASE MANAGEMENT | Age: 73
End: 2018-10-15

## 2018-10-15 NOTE — PROGRESS NOTES
This note will not be viewable in [address] Initial ANJANA outreach attempt to patient's home number was unsuccessful. Left message to return call. Will attempt second outreach within 24 hours.

## 2018-10-16 ENCOUNTER — PATIENT OUTREACH (OUTPATIENT)
Dept: CASE MANAGEMENT | Age: 73
End: 2018-10-16

## 2018-10-16 NOTE — PROGRESS NOTES
This note will not be viewable in [address] Transition of Care Discharge Follow-up Questionnaire Date/Time of Call: 
 10/16/18 12:50pm  
What was the patient hospitalized for? Acute Respiratory Failure With Hypoxia Does the patient understand his/her diagnosis and/or treatment and what happened during the hospitalization? Yes, spoke with patient, she states her understanding of diagnosis and treatment; and is agreeable to call. Patient states she is doing well Did the patient receive discharge instructions? Yes   
CM Assessed Risk for Readmission:  
 
 
Patient stated Risk for Readmission: Low/moderate r/t diagnosis and comorbidities To get my surgery I was supposed to have- spinal stimulator placement Review any discharge instructions (see discharge instructions/AVS in [facility]). Ask patient if they understand these. Do they have any questions? Reviewed, understanding is stated, no questions at this time Were home services ordered (nursing, PT, OT, ST, etc.)? No  
  
If so, has the first visit occurred? If not, why? (Assist with coordination of services if necessary.) 
 N/A Was any DME ordered? Yes, [facility] 
  
If so, has it been received? If not, why?  (Assist patient in obtaining DME orders &/or equipment if necessary.) Yes Complete a review of all medications (new, continued and discontinued meds per the D/C instructions and medication tab in [address] [place] [address]). Completed Were all new prescriptions filled? If not, why?  (Assist patient in obtaining medications if necessary  escalate for CCM &/or SW if ongoing issues are verbalized by pt or anticipated) No new medications at discharge Does the patient understand the purpose and dosing instructions for all medications? (If patient has questions, provide explanation and education.) Yes, understanding of medications Does the patient have any problems in performing ADLs? (If patient is unable to perform ADLs  what is the limiting factor(s)? Do they have a support system that can assist? If no support system is present, discuss possible assistance that they may be able to obtain. Escalate for CCM/SW if ongoing issues are verbalized by pt or anticipated) Independent with ADLs Does the patient have all follow-up appointments scheduled? 7 day f/up with PCP?  
(f/up with PCP may be w/in 14 days if patient has a f/up with their specialist w/in 7 days) 7-14 day f/up with specialist?  
(or per discharge instructions) If f/up has not been made  what actions has the care coordinator made to accomplish this? Has transportation been arranged? No- patient states she is calling for appointments today Inez Brooks MD Schedule an appointment as soon as possible for a visit in 1 week [address] [facility] [address] [facility] 
[phone] Kathy Guerrero MD (cardiology) Schedule an appointment as soon as possible for a visit in 1 month [address] [facility] [address] [phone] Olga Hinton MD (pulmonology) Schedule an appointment as soon as possible for a visit in 1 month [facility] [address] [facility] [address] [phone] Yes, no transportation issues Any other questions or concerns expressed by the patient? No further questions or concerns at this time. Patient states her gratitude for follow up Contact information for [facility] was given, instructed to call with new questions or concerns. Schedule next appointment with ROGELIO SCHULER Coordinator or refer to RN Case Manager/ per the workflow guidelines. When is care coordinators next follow-up call scheduled? If referred for CCM  what RN care manager was the referral assigned? Community Care Coordinator will follow per workflow guidelines 2 to 3 weeks ANJANA Call Completed By: Kelley Smith LPN Community Care Coordinator

## 2018-10-16 NOTE — PROGRESS NOTES
This note will not be viewable in [address] Second ANJANA outreach attempt made to patient's home number was unsuccessful. Left message to return call. Will attempt third outreach within 5 business days.

## 2018-10-16 NOTE — PROGRESS NOTES
This note will not be viewable in [address] Transition of Care Discharge Follow-up Questionnaire Date/Time of Call: 
 10/16/18 12:50pm  
What was the patient hospitalized for? Acute Respiratory Failure With Hypoxia Does the patient understand his/her diagnosis and/or treatment and what happened during the hospitalization? Yes, spoke with patient, she states her understanding of diagnosis and treatment; and is agreeable to call. Patient states she is doing well Did the patient receive discharge instructions? Yes   
CM Assessed Risk for Readmission:  
 
 
Patient stated Risk for Readmission: Low/moderate r/t diagnosis and comorbidities To get my surgery I was supposed to have- spinal stimulator placement Review any discharge instructions (see discharge instructions/AVS in [facility]). Ask patient if they understand these. Do they have any questions? Reviewed, understanding is stated, no questions at this time Were home services ordered (nursing, PT, OT, ST, etc.)? No  
  
If so, has the first visit occurred? If not, why? (Assist with coordination of services if necessary.) 
 N/A Was any DME ordered? Yes, [facility] 
  
If so, has it been received? If not, why?  (Assist patient in obtaining DME orders &/or equipment if necessary.) Yes Complete a review of all medications (new, continued and discontinued meds per the D/C instructions and medication tab in [facility]). Completed Were all new prescriptions filled? If not, why?  (Assist patient in obtaining medications if necessary  escalate for CCM &/or SW if ongoing issues are verbalized by pt or anticipated) No new medications at discharge Does the patient understand the purpose and dosing instructions for all medications? (If patient has questions, provide explanation and education.) Yes, understanding of medications Does the patient have any problems in performing ADLs? (If patient is unable to perform ADLs  what is the limiting factor(s)? Do they have a support system that can assist? If no support system is present, discuss possible assistance that they may be able to obtain. Escalate for CCM/SW if ongoing issues are verbalized by pt or anticipated) Independent with ADLs Does the patient have all follow-up appointments scheduled? 7 day f/up with PCP?  
(f/up with PCP may be w/in 14 days if patient has a f/up with their specialist w/in 7 days) 7-14 day f/up with specialist?  
(or per discharge instructions) If f/up has not been made  what actions has the care coordinator made to accomplish this? Has transportation been arranged? No- patient states she is calling for appointments today Manish Ramirez MD Schedule an appointment as soon as possible for a visit in 1 week [address] 
[phone] Pablo Rose MD (cardiology) Schedule an appointment as soon as possible for a visit in 1 month [address] [facility] [address] 
[phone] Otto Desai MD (pulmonology) Schedule an appointment as soon as possible for a visit in 1 month [facility] [address] [facility] [address] [phone] Yes, no transportation issues Any other questions or concerns expressed by the patient? No further questions or concerns at this time. Patient states her gratitude for follow up Contact information for [facility] was given, instructed to call with new questions or concerns. Schedule next appointment with ROGELIO SCHULER Coordinator or refer to RN Case Manager/ per the workflow guidelines. When is care coordinators next follow-up call scheduled? If referred for CCM  what RN care manager was the referral assigned? Community Care Coordinator will follow per workflow guidelines 2 to 3 weeks ANJANA Call Completed By: Lorna Reina LPN Community Care Coordinator

## 2018-10-25 RX ORDER — CEFAZOLIN SODIUM/WATER 2 G/20 ML
2 SYRINGE (ML) INTRAVENOUS ONCE
Status: CANCELLED | OUTPATIENT
Start: 2018-10-25 | End: 2018-10-25

## 2018-10-29 NOTE — H&P
Allergies:????? Medications:Ativan (0.5 MG, take 1tablet 1 hour prior to mri prn anxiety NEEDS ); Centrum Silver;Cyclobenzaprine HCl (10 MG, Take 1 tablet(s) by mouth 3 times a day as needed [PRN]);Gabapentin (300 MG, Take 1 tablet(s) by mouth 2 times a day for 30 days); GlipiZIDE; Invokana;Klor-Con;Levothyroxine Sodium; Losartan Potassium; Lovastatin;MetFORMIN HCl;Nadolol;Norco ( MG, Take 1 tablet(s) by mouth every 4-6 hours as needed [PRN]); Omeprazole;PARoxetine HCl;PredniSONE (10 MG (48), USE AS DIRECTED); Vitamin B12;Vitamin D3;Zofran (4 MG, Take 1 tablet(s) by mouth every 4-6 hours as needed [PRN]); Zolpidem Tartrate CC: Low back pain HPI:  Recheck. I last her in 04/2018. She is 68years-old. She has been having back and right leg pain with some weakness. An old scan showed an L3-4 herniated disc and a newer scan showed a bigger herniation at L3-4. She did not get long term relief with conservative treatments, but she also has a spondylolisthesis at L3-4. We did not feel that she was a good surgical candidate for a fusion. She is morbidly obese. She is very deconditioned. Her bones are osteopenic. We thought there would be lots of complications. She has been at Pain Management and they have done a stimulator trial which worked very well. It only partially helped with the leg pain, but all the back pain resolved. She is here for a permanent placement. We discussed the surgery, overnight stay, restrictions for 6 weeks; no reaching overhead, twisting or bending could make the lead move; the fact that it is not turned on the day of surgery. She has to have a follow-up a couple of days later for them to program it. We went through the risks, including death, paralysis, infection, bleeding, transfusion, heart attack, stroke, clot in the leg, clot in the lung, dural tear, migration of lead and failure to get pain relief.   I answered her questions. She would like the battery in the left buttock; she sleeps on the right and that is where we normally put it. I would say if she gets a rechargeable it has to be the small one. We did look at her back and buttocks. Even though she is morbidly obese, there is not any spot in the back for the battery. It will have to be in the buttocks. We went through her medical history. She is a noninsulin dependent diabetic. She is not on any blood thinners. She is relatively healthy outside of her obesity and general debility. She really has not walked much in a while. She is getting around in a wheelchair. PE:  On exam, pupils are equal, round and reactive to light. Oropharynx is clear. Neck is without adenopathy. Her lungs were clear to auscultation bilaterally. Heart is regular rate and rhythm without murmur. Abdomen is morbidly obese, soft and nontender. She is alert and oriented x3 with normal affect. X-RAYS:  We did get AP and lateral thoracolumbar x-rays today, 09/18/2018. On the AP view, she is straight. It looks like there is some degenerative changes around the thoracolumbar junction on the lateral view. No acute kyphosis or fracture, but I do see degenerative changes. We can see down to L3-4 where she has a spondylolisthesis and it also looks like she has a spondylolisthesis L5-S1. X-ray Impression:  Degenerative spine; the bone looks osteopenic. She has spondylolisthesis L3-4 and L5-S1. No obvious acute fractures. ASSESSMENT/PLAN:  Once again, I discussed everything and answered her questions. She would like to proceed; it is a [place] and it is for right leg pain and back pain.     
 
 
 
Electronically Signed By Ana Knapp MD

## 2018-10-31 ENCOUNTER — ANESTHESIA EVENT (OUTPATIENT)
Dept: SURGERY | Age: 73
End: 2018-10-31
Payer: MEDICARE

## 2018-11-01 ENCOUNTER — APPOINTMENT (OUTPATIENT)
Dept: GENERAL RADIOLOGY | Age: 73
End: 2018-11-01
Attending: ORTHOPAEDIC SURGERY
Payer: MEDICARE

## 2018-11-01 ENCOUNTER — ANESTHESIA (OUTPATIENT)
Dept: SURGERY | Age: 73
End: 2018-11-01
Payer: MEDICARE

## 2018-11-01 ENCOUNTER — HOSPITAL ENCOUNTER (OUTPATIENT)
Age: 73
Setting detail: OBSERVATION
Discharge: HOME | End: 2018-11-02
Attending: ORTHOPAEDIC SURGERY | Admitting: ORTHOPAEDIC SURGERY
Payer: MEDICARE

## 2018-11-01 ENCOUNTER — PATIENT OUTREACH (OUTPATIENT)
Dept: CASE MANAGEMENT | Age: 73
End: 2018-11-01

## 2018-11-01 DIAGNOSIS — G89.4 CHRONIC PAIN SYNDROME: Primary | ICD-10-CM

## 2018-11-01 LAB — GLUCOSE BLD STRIP.AUTO-MCNC: 119 MG/DL (ref 65–100)

## 2018-11-01 PROCEDURE — 77030018836 HC SOL IRR NACL ICUM -A: Performed by: ORTHOPAEDIC SURGERY

## 2018-11-01 PROCEDURE — 76010000162 HC OR TIME 1.5 TO 2 HR INTENSV-TIER 1: Performed by: ORTHOPAEDIC SURGERY

## 2018-11-01 PROCEDURE — 77030014650 HC SEAL MTRX FLOSEL BAXT -C: Performed by: ORTHOPAEDIC SURGERY

## 2018-11-01 PROCEDURE — 76210000001 HC OR PH I REC 2.5 TO 3 HR: Performed by: ORTHOPAEDIC SURGERY

## 2018-11-01 PROCEDURE — 77030020255 HC SOL INJ LR 1000ML BG

## 2018-11-01 PROCEDURE — 74011250636 HC RX REV CODE- 250/636: Performed by: ORTHOPAEDIC SURGERY

## 2018-11-01 PROCEDURE — 77030032490 HC SLV COMPR SCD KNE COVD -B: Performed by: ORTHOPAEDIC SURGERY

## 2018-11-01 PROCEDURE — 82962 GLUCOSE BLOOD TEST: CPT

## 2018-11-01 PROCEDURE — 77030037088 HC TUBE ENDOTRACH ORAL NSL COVD-A: Performed by: ANESTHESIOLOGY

## 2018-11-01 PROCEDURE — 77030031139 HC SUT VCRL2 J&J -A: Performed by: ORTHOPAEDIC SURGERY

## 2018-11-01 PROCEDURE — 77030012894: Performed by: ORTHOPAEDIC SURGERY

## 2018-11-01 PROCEDURE — 76060000034 HC ANESTHESIA 1.5 TO 2 HR: Performed by: ORTHOPAEDIC SURGERY

## 2018-11-01 PROCEDURE — 99218 HC RM OBSERVATION: CPT

## 2018-11-01 PROCEDURE — 74011000250 HC RX REV CODE- 250: Performed by: ANESTHESIOLOGY

## 2018-11-01 PROCEDURE — 77030016570 HC BLNKT BAIR HGGR 3M -B: Performed by: ANESTHESIOLOGY

## 2018-11-01 PROCEDURE — 77030008703 HC TU ET UNCUF COVD -A: Performed by: ANESTHESIOLOGY

## 2018-11-01 PROCEDURE — 74011000250 HC RX REV CODE- 250

## 2018-11-01 PROCEDURE — 77030019908 HC STETH ESOPH SIMS -A: Performed by: ANESTHESIOLOGY

## 2018-11-01 PROCEDURE — 74011250636 HC RX REV CODE- 250/636

## 2018-11-01 PROCEDURE — 77030039425 HC BLD LARYNG TRULITE DISP TELE -A: Performed by: ANESTHESIOLOGY

## 2018-11-01 PROCEDURE — 74011250637 HC RX REV CODE- 250/637: Performed by: ORTHOPAEDIC SURGERY

## 2018-11-01 PROCEDURE — C1767 GENERATOR, NEURO NON-RECHARG: HCPCS | Performed by: ORTHOPAEDIC SURGERY

## 2018-11-01 PROCEDURE — 74011250636 HC RX REV CODE- 250/636: Performed by: ANESTHESIOLOGY

## 2018-11-01 PROCEDURE — 76210000006 HC OR PH I REC 0.5 TO 1 HR: Performed by: ORTHOPAEDIC SURGERY

## 2018-11-01 PROCEDURE — 72020 X-RAY EXAM OF SPINE 1 VIEW: CPT

## 2018-11-01 PROCEDURE — C1778 LEAD, NEUROSTIMULATOR: HCPCS

## 2018-11-01 PROCEDURE — 74011250637 HC RX REV CODE- 250/637: Performed by: ANESTHESIOLOGY

## 2018-11-01 DEVICE — LEAD NERVE STIM 60 CM 16 ELECTRD PENTA: Type: IMPLANTABLE DEVICE | Site: SPINE THORACIC | Status: FUNCTIONAL

## 2018-11-01 DEVICE — GEN IPG DL QUAT SNGL 7.5AH -- W/PAT CNTRLR PROCLAIM ELITE 7: Type: IMPLANTABLE DEVICE | Site: SPINE LUMBAR | Status: FUNCTIONAL

## 2018-11-01 RX ORDER — DIPHENHYDRAMINE HCL 25 MG
25 CAPSULE ORAL
Status: DISCONTINUED | OUTPATIENT
Start: 2018-11-01 | End: 2018-11-02 | Stop reason: HOSPADM

## 2018-11-01 RX ORDER — SODIUM CHLORIDE, SODIUM LACTATE, POTASSIUM CHLORIDE, CALCIUM CHLORIDE 600; 310; 30; 20 MG/100ML; MG/100ML; MG/100ML; MG/100ML
100 INJECTION, SOLUTION INTRAVENOUS CONTINUOUS
Status: DISCONTINUED | OUTPATIENT
Start: 2018-11-01 | End: 2018-11-01 | Stop reason: HOSPADM

## 2018-11-01 RX ORDER — ONDANSETRON 2 MG/ML
4 INJECTION INTRAMUSCULAR; INTRAVENOUS
Status: DISCONTINUED | OUTPATIENT
Start: 2018-11-01 | End: 2018-11-02 | Stop reason: HOSPADM

## 2018-11-01 RX ORDER — CEFAZOLIN SODIUM/WATER 2 G/20 ML
2 SYRINGE (ML) INTRAVENOUS ONCE
Status: COMPLETED | OUTPATIENT
Start: 2018-11-01 | End: 2018-11-01

## 2018-11-01 RX ORDER — LIDOCAINE HYDROCHLORIDE 10 MG/ML
0.1 INJECTION INFILTRATION; PERINEURAL AS NEEDED
Status: CANCELLED | OUTPATIENT
Start: 2018-11-01

## 2018-11-01 RX ORDER — SODIUM CHLORIDE 0.9 % (FLUSH) 0.9 %
5-10 SYRINGE (ML) INJECTION AS NEEDED
Status: DISCONTINUED | OUTPATIENT
Start: 2018-11-01 | End: 2018-11-01 | Stop reason: HOSPADM

## 2018-11-01 RX ORDER — SODIUM CHLORIDE 0.9 % (FLUSH) 0.9 %
5-10 SYRINGE (ML) INJECTION EVERY 8 HOURS
Status: DISCONTINUED | OUTPATIENT
Start: 2018-11-01 | End: 2018-11-02 | Stop reason: HOSPADM

## 2018-11-01 RX ORDER — PROPOFOL 10 MG/ML
INJECTION, EMULSION INTRAVENOUS AS NEEDED
Status: DISCONTINUED | OUTPATIENT
Start: 2018-11-01 | End: 2018-11-01 | Stop reason: HOSPADM

## 2018-11-01 RX ORDER — VANCOMYCIN HYDROCHLORIDE 1 G/20ML
INJECTION, POWDER, LYOPHILIZED, FOR SOLUTION INTRAVENOUS AS NEEDED
Status: DISCONTINUED | OUTPATIENT
Start: 2018-11-01 | End: 2018-11-01 | Stop reason: HOSPADM

## 2018-11-01 RX ORDER — LIDOCAINE HYDROCHLORIDE 20 MG/ML
INJECTION, SOLUTION EPIDURAL; INFILTRATION; INTRACAUDAL; PERINEURAL AS NEEDED
Status: DISCONTINUED | OUTPATIENT
Start: 2018-11-01 | End: 2018-11-01 | Stop reason: HOSPADM

## 2018-11-01 RX ORDER — MIDAZOLAM HYDROCHLORIDE 1 MG/ML
2 INJECTION, SOLUTION INTRAMUSCULAR; INTRAVENOUS
Status: DISCONTINUED | OUTPATIENT
Start: 2018-11-01 | End: 2018-11-01 | Stop reason: HOSPADM

## 2018-11-01 RX ORDER — SODIUM CHLORIDE 0.9 % (FLUSH) 0.9 %
5-10 SYRINGE (ML) INJECTION AS NEEDED
Status: CANCELLED | OUTPATIENT
Start: 2018-11-01

## 2018-11-01 RX ORDER — DIPHENHYDRAMINE HYDROCHLORIDE 50 MG/ML
12.5 INJECTION, SOLUTION INTRAMUSCULAR; INTRAVENOUS ONCE
Status: DISCONTINUED | OUTPATIENT
Start: 2018-11-01 | End: 2018-11-01 | Stop reason: HOSPADM

## 2018-11-01 RX ORDER — SODIUM CHLORIDE 0.9 % (FLUSH) 0.9 %
5-10 SYRINGE (ML) INJECTION EVERY 8 HOURS
Status: DISCONTINUED | OUTPATIENT
Start: 2018-11-01 | End: 2018-11-01 | Stop reason: HOSPADM

## 2018-11-01 RX ORDER — CEFAZOLIN SODIUM/WATER 2 G/20 ML
2 SYRINGE (ML) INTRAVENOUS EVERY 8 HOURS
Status: COMPLETED | OUTPATIENT
Start: 2018-11-01 | End: 2018-11-02

## 2018-11-01 RX ORDER — GLYCOPYRROLATE 0.2 MG/ML
INJECTION INTRAMUSCULAR; INTRAVENOUS AS NEEDED
Status: DISCONTINUED | OUTPATIENT
Start: 2018-11-01 | End: 2018-11-01 | Stop reason: HOSPADM

## 2018-11-01 RX ORDER — HYDROMORPHONE HYDROCHLORIDE 2 MG/ML
0.5 INJECTION, SOLUTION INTRAMUSCULAR; INTRAVENOUS; SUBCUTANEOUS
Status: COMPLETED | OUTPATIENT
Start: 2018-11-01 | End: 2018-11-01

## 2018-11-01 RX ORDER — EPHEDRINE SULFATE 50 MG/ML
INJECTION, SOLUTION INTRAVENOUS AS NEEDED
Status: DISCONTINUED | OUTPATIENT
Start: 2018-11-01 | End: 2018-11-01 | Stop reason: HOSPADM

## 2018-11-01 RX ORDER — SODIUM CHLORIDE, SODIUM LACTATE, POTASSIUM CHLORIDE, CALCIUM CHLORIDE 600; 310; 30; 20 MG/100ML; MG/100ML; MG/100ML; MG/100ML
100 INJECTION, SOLUTION INTRAVENOUS CONTINUOUS
Status: CANCELLED | OUTPATIENT
Start: 2018-11-01 | End: 2018-11-02

## 2018-11-01 RX ORDER — OXYCODONE HYDROCHLORIDE 5 MG/1
10 TABLET ORAL
Qty: 20 TAB | Refills: 0 | Status: SHIPPED | OUTPATIENT
Start: 2018-11-01

## 2018-11-01 RX ORDER — FENTANYL CITRATE 50 UG/ML
25 INJECTION, SOLUTION INTRAMUSCULAR; INTRAVENOUS ONCE
Status: CANCELLED | OUTPATIENT
Start: 2018-11-01 | End: 2018-11-01

## 2018-11-01 RX ORDER — ONDANSETRON 2 MG/ML
INJECTION INTRAMUSCULAR; INTRAVENOUS AS NEEDED
Status: DISCONTINUED | OUTPATIENT
Start: 2018-11-01 | End: 2018-11-01 | Stop reason: HOSPADM

## 2018-11-01 RX ORDER — DEXAMETHASONE SODIUM PHOSPHATE 4 MG/ML
INJECTION, SOLUTION INTRA-ARTICULAR; INTRALESIONAL; INTRAMUSCULAR; INTRAVENOUS; SOFT TISSUE AS NEEDED
Status: DISCONTINUED | OUTPATIENT
Start: 2018-11-01 | End: 2018-11-01 | Stop reason: HOSPADM

## 2018-11-01 RX ORDER — SODIUM CHLORIDE 9 MG/ML
50 INJECTION, SOLUTION INTRAVENOUS CONTINUOUS
Status: CANCELLED | OUTPATIENT
Start: 2018-11-01 | End: 2018-11-02

## 2018-11-01 RX ORDER — HYDROMORPHONE HYDROCHLORIDE 1 MG/ML
1 INJECTION, SOLUTION INTRAMUSCULAR; INTRAVENOUS; SUBCUTANEOUS
Status: DISCONTINUED | OUTPATIENT
Start: 2018-11-01 | End: 2018-11-02 | Stop reason: HOSPADM

## 2018-11-01 RX ORDER — NADOLOL 40 MG/1
80 TABLET ORAL
Status: DISCONTINUED | OUTPATIENT
Start: 2018-11-01 | End: 2018-11-02 | Stop reason: HOSPADM

## 2018-11-01 RX ORDER — OXYCODONE AND ACETAMINOPHEN 7.5; 325 MG/1; MG/1
1 TABLET ORAL
Status: DISCONTINUED | OUTPATIENT
Start: 2018-11-01 | End: 2018-11-02 | Stop reason: HOSPADM

## 2018-11-01 RX ORDER — ZOLPIDEM TARTRATE 5 MG/1
15 TABLET ORAL
Status: DISCONTINUED | OUTPATIENT
Start: 2018-11-01 | End: 2018-11-02 | Stop reason: HOSPADM

## 2018-11-01 RX ORDER — OXYCODONE HYDROCHLORIDE 5 MG/1
5 TABLET ORAL
Status: DISCONTINUED | OUTPATIENT
Start: 2018-11-01 | End: 2018-11-01 | Stop reason: HOSPADM

## 2018-11-01 RX ORDER — SODIUM CHLORIDE, SODIUM LACTATE, POTASSIUM CHLORIDE, CALCIUM CHLORIDE 600; 310; 30; 20 MG/100ML; MG/100ML; MG/100ML; MG/100ML
100 INJECTION, SOLUTION INTRAVENOUS CONTINUOUS
Status: DISCONTINUED | OUTPATIENT
Start: 2018-11-01 | End: 2018-11-02 | Stop reason: HOSPADM

## 2018-11-01 RX ORDER — IBUPROFEN 400 MG/1
400 TABLET ORAL
Status: DISCONTINUED | OUTPATIENT
Start: 2018-11-01 | End: 2018-11-02 | Stop reason: HOSPADM

## 2018-11-01 RX ORDER — SODIUM CHLORIDE 9 MG/ML
50 INJECTION, SOLUTION INTRAVENOUS CONTINUOUS
Status: DISCONTINUED | OUTPATIENT
Start: 2018-11-01 | End: 2018-11-01 | Stop reason: HOSPADM

## 2018-11-01 RX ORDER — ROCURONIUM BROMIDE 10 MG/ML
INJECTION, SOLUTION INTRAVENOUS AS NEEDED
Status: DISCONTINUED | OUTPATIENT
Start: 2018-11-01 | End: 2018-11-01 | Stop reason: HOSPADM

## 2018-11-01 RX ORDER — CEFAZOLIN SODIUM 1 G/3ML
INJECTION, POWDER, FOR SOLUTION INTRAMUSCULAR; INTRAVENOUS AS NEEDED
Status: DISCONTINUED | OUTPATIENT
Start: 2018-11-01 | End: 2018-11-01 | Stop reason: HOSPADM

## 2018-11-01 RX ORDER — LOSARTAN POTASSIUM 50 MG/1
100 TABLET ORAL DAILY
Status: DISCONTINUED | OUTPATIENT
Start: 2018-11-02 | End: 2018-11-02 | Stop reason: HOSPADM

## 2018-11-01 RX ORDER — FAMOTIDINE 20 MG/1
20 TABLET, FILM COATED ORAL ONCE
Status: CANCELLED | OUTPATIENT
Start: 2018-11-01 | End: 2018-11-01

## 2018-11-01 RX ORDER — OXYCODONE AND ACETAMINOPHEN 5; 325 MG/1; MG/1
1 TABLET ORAL AS NEEDED
Status: DISCONTINUED | OUTPATIENT
Start: 2018-11-01 | End: 2018-11-01 | Stop reason: HOSPADM

## 2018-11-01 RX ORDER — HYDROMORPHONE HYDROCHLORIDE 2 MG/ML
0.5 INJECTION, SOLUTION INTRAMUSCULAR; INTRAVENOUS; SUBCUTANEOUS
Status: DISCONTINUED | OUTPATIENT
Start: 2018-11-01 | End: 2018-11-01 | Stop reason: HOSPADM

## 2018-11-01 RX ORDER — NEOSTIGMINE METHYLSULFATE 1 MG/ML
INJECTION INTRAVENOUS AS NEEDED
Status: DISCONTINUED | OUTPATIENT
Start: 2018-11-01 | End: 2018-11-01 | Stop reason: HOSPADM

## 2018-11-01 RX ORDER — SODIUM CHLORIDE 0.9 % (FLUSH) 0.9 %
5-10 SYRINGE (ML) INJECTION AS NEEDED
Status: DISCONTINUED | OUTPATIENT
Start: 2018-11-01 | End: 2018-11-02 | Stop reason: HOSPADM

## 2018-11-01 RX ORDER — PANTOPRAZOLE SODIUM 40 MG/1
40 TABLET, DELAYED RELEASE ORAL
Status: DISCONTINUED | OUTPATIENT
Start: 2018-11-02 | End: 2018-11-02 | Stop reason: HOSPADM

## 2018-11-01 RX ORDER — METFORMIN HYDROCHLORIDE 500 MG/1
1000 TABLET ORAL 2 TIMES DAILY WITH MEALS
Status: DISCONTINUED | OUTPATIENT
Start: 2018-11-01 | End: 2018-11-02 | Stop reason: HOSPADM

## 2018-11-01 RX ORDER — OXYBUTYNIN CHLORIDE 5 MG/1
5 TABLET ORAL 2 TIMES DAILY
Status: DISCONTINUED | OUTPATIENT
Start: 2018-11-01 | End: 2018-11-02 | Stop reason: HOSPADM

## 2018-11-01 RX ORDER — MIDAZOLAM HYDROCHLORIDE 1 MG/ML
2 INJECTION, SOLUTION INTRAMUSCULAR; INTRAVENOUS ONCE
Status: DISCONTINUED | OUTPATIENT
Start: 2018-11-01 | End: 2018-11-01 | Stop reason: HOSPADM

## 2018-11-01 RX ORDER — SODIUM CHLORIDE 0.9 % (FLUSH) 0.9 %
5-10 SYRINGE (ML) INJECTION EVERY 8 HOURS
Status: CANCELLED | OUTPATIENT
Start: 2018-11-01

## 2018-11-01 RX ORDER — CYCLOBENZAPRINE HCL 10 MG
5 TABLET ORAL
Status: DISCONTINUED | OUTPATIENT
Start: 2018-11-01 | End: 2018-11-02 | Stop reason: HOSPADM

## 2018-11-01 RX ORDER — FENTANYL CITRATE 50 UG/ML
25 INJECTION, SOLUTION INTRAMUSCULAR; INTRAVENOUS ONCE
Status: DISCONTINUED | OUTPATIENT
Start: 2018-11-01 | End: 2018-11-01 | Stop reason: HOSPADM

## 2018-11-01 RX ORDER — AMLODIPINE BESYLATE 10 MG/1
10 TABLET ORAL DAILY
Status: DISCONTINUED | OUTPATIENT
Start: 2018-11-02 | End: 2018-11-02 | Stop reason: HOSPADM

## 2018-11-01 RX ORDER — PAROXETINE HYDROCHLORIDE 20 MG/1
20 TABLET, FILM COATED ORAL DAILY
Status: DISCONTINUED | OUTPATIENT
Start: 2018-11-02 | End: 2018-11-02 | Stop reason: HOSPADM

## 2018-11-01 RX ORDER — LEVOTHYROXINE SODIUM 50 UG/1
50 TABLET ORAL
Status: DISCONTINUED | OUTPATIENT
Start: 2018-11-02 | End: 2018-11-02 | Stop reason: HOSPADM

## 2018-11-01 RX ORDER — FENTANYL CITRATE 50 UG/ML
INJECTION, SOLUTION INTRAMUSCULAR; INTRAVENOUS AS NEEDED
Status: DISCONTINUED | OUTPATIENT
Start: 2018-11-01 | End: 2018-11-01 | Stop reason: HOSPADM

## 2018-11-01 RX ORDER — MIDAZOLAM HYDROCHLORIDE 1 MG/ML
2 INJECTION, SOLUTION INTRAMUSCULAR; INTRAVENOUS ONCE
Status: CANCELLED | OUTPATIENT
Start: 2018-11-01 | End: 2018-11-01

## 2018-11-01 RX ORDER — FAMOTIDINE 20 MG/1
20 TABLET, FILM COATED ORAL ONCE
Status: COMPLETED | OUTPATIENT
Start: 2018-11-01 | End: 2018-11-01

## 2018-11-01 RX ORDER — LIDOCAINE HYDROCHLORIDE 10 MG/ML
0.1 INJECTION INFILTRATION; PERINEURAL AS NEEDED
Status: DISCONTINUED | OUTPATIENT
Start: 2018-11-01 | End: 2018-11-01 | Stop reason: HOSPADM

## 2018-11-01 RX ORDER — MIDAZOLAM HYDROCHLORIDE 1 MG/ML
2 INJECTION, SOLUTION INTRAMUSCULAR; INTRAVENOUS
Status: CANCELLED | OUTPATIENT
Start: 2018-11-01 | End: 2018-11-02

## 2018-11-01 RX ADMIN — ROCURONIUM BROMIDE 10 MG: 10 INJECTION, SOLUTION INTRAVENOUS at 08:51

## 2018-11-01 RX ADMIN — OXYBUTYNIN CHLORIDE 5 MG: 5 TABLET ORAL at 16:37

## 2018-11-01 RX ADMIN — ONDANSETRON 4 MG: 2 INJECTION INTRAMUSCULAR; INTRAVENOUS at 08:39

## 2018-11-01 RX ADMIN — PROPOFOL 150 MG: 10 INJECTION, EMULSION INTRAVENOUS at 08:05

## 2018-11-01 RX ADMIN — OXYCODONE HYDROCHLORIDE AND ACETAMINOPHEN 1 TABLET: 7.5; 325 TABLET ORAL at 17:26

## 2018-11-01 RX ADMIN — PROPOFOL 20 MG: 10 INJECTION, EMULSION INTRAVENOUS at 08:18

## 2018-11-01 RX ADMIN — LIDOCAINE HYDROCHLORIDE 100 MG: 20 INJECTION, SOLUTION EPIDURAL; INFILTRATION; INTRACAUDAL; PERINEURAL at 08:05

## 2018-11-01 RX ADMIN — FENTANYL CITRATE 25 MCG: 50 INJECTION, SOLUTION INTRAMUSCULAR; INTRAVENOUS at 08:53

## 2018-11-01 RX ADMIN — SODIUM CHLORIDE, SODIUM LACTATE, POTASSIUM CHLORIDE, AND CALCIUM CHLORIDE 100 ML/HR: 600; 310; 30; 20 INJECTION, SOLUTION INTRAVENOUS at 13:33

## 2018-11-01 RX ADMIN — NADOLOL 80 MG: 40 TABLET ORAL at 20:55

## 2018-11-01 RX ADMIN — HYDROMORPHONE HYDROCHLORIDE 0.5 MG: 2 INJECTION, SOLUTION INTRAMUSCULAR; INTRAVENOUS; SUBCUTANEOUS at 10:29

## 2018-11-01 RX ADMIN — FENTANYL CITRATE 25 MCG: 50 INJECTION, SOLUTION INTRAMUSCULAR; INTRAVENOUS at 09:13

## 2018-11-01 RX ADMIN — SODIUM CHLORIDE, SODIUM LACTATE, POTASSIUM CHLORIDE, AND CALCIUM CHLORIDE 100 ML/HR: 600; 310; 30; 20 INJECTION, SOLUTION INTRAVENOUS at 07:27

## 2018-11-01 RX ADMIN — IBUPROFEN 400 MG: 400 TABLET ORAL at 16:36

## 2018-11-01 RX ADMIN — ROCURONIUM BROMIDE 40 MG: 10 INJECTION, SOLUTION INTRAVENOUS at 08:05

## 2018-11-01 RX ADMIN — PROPOFOL 10 MG: 10 INJECTION, EMULSION INTRAVENOUS at 09:26

## 2018-11-01 RX ADMIN — EPHEDRINE SULFATE 5 MG: 50 INJECTION, SOLUTION INTRAVENOUS at 09:22

## 2018-11-01 RX ADMIN — HYDROMORPHONE HYDROCHLORIDE 0.5 MG: 2 INJECTION, SOLUTION INTRAMUSCULAR; INTRAVENOUS; SUBCUTANEOUS at 10:12

## 2018-11-01 RX ADMIN — Medication 3 AMPULE: at 07:26

## 2018-11-01 RX ADMIN — NEOSTIGMINE METHYLSULFATE 4 MG: 1 INJECTION INTRAVENOUS at 09:45

## 2018-11-01 RX ADMIN — FAMOTIDINE 20 MG: 20 TABLET ORAL at 07:26

## 2018-11-01 RX ADMIN — Medication 1 AMPULE: at 13:29

## 2018-11-01 RX ADMIN — Medication 2 G: at 08:15

## 2018-11-01 RX ADMIN — Medication 1 AMPULE: at 20:55

## 2018-11-01 RX ADMIN — HYDROMORPHONE HYDROCHLORIDE 0.5 MG: 2 INJECTION, SOLUTION INTRAMUSCULAR; INTRAVENOUS; SUBCUTANEOUS at 10:41

## 2018-11-01 RX ADMIN — Medication 10 ML: at 22:14

## 2018-11-01 RX ADMIN — DEXAMETHASONE SODIUM PHOSPHATE 4 MG: 4 INJECTION, SOLUTION INTRA-ARTICULAR; INTRALESIONAL; INTRAMUSCULAR; INTRAVENOUS; SOFT TISSUE at 08:39

## 2018-11-01 RX ADMIN — HYDROMORPHONE HYDROCHLORIDE 0.5 MG: 2 INJECTION, SOLUTION INTRAMUSCULAR; INTRAVENOUS; SUBCUTANEOUS at 11:59

## 2018-11-01 RX ADMIN — METFORMIN HYDROCHLORIDE 1000 MG: 500 TABLET ORAL at 16:36

## 2018-11-01 RX ADMIN — ZOLPIDEM TARTRATE 15 MG: 5 TABLET ORAL at 22:13

## 2018-11-01 RX ADMIN — OXYCODONE HYDROCHLORIDE AND ACETAMINOPHEN 1 TABLET: 7.5; 325 TABLET ORAL at 20:55

## 2018-11-01 RX ADMIN — Medication 2 G: at 22:14

## 2018-11-01 RX ADMIN — PROMETHAZINE HYDROCHLORIDE 6.25 MG: 25 INJECTION INTRAMUSCULAR; INTRAVENOUS at 10:04

## 2018-11-01 RX ADMIN — SODIUM CHLORIDE, SODIUM LACTATE, POTASSIUM CHLORIDE, AND CALCIUM CHLORIDE 100 ML/HR: 600; 310; 30; 20 INJECTION, SOLUTION INTRAVENOUS at 22:18

## 2018-11-01 RX ADMIN — ROCURONIUM BROMIDE 10 MG: 10 INJECTION, SOLUTION INTRAVENOUS at 09:26

## 2018-11-01 RX ADMIN — PROPOFOL 20 MG: 10 INJECTION, EMULSION INTRAVENOUS at 08:51

## 2018-11-01 RX ADMIN — FENTANYL CITRATE 50 MCG: 50 INJECTION, SOLUTION INTRAMUSCULAR; INTRAVENOUS at 08:05

## 2018-11-01 RX ADMIN — OXYCODONE HYDROCHLORIDE AND ACETAMINOPHEN 1 TABLET: 7.5; 325 TABLET ORAL at 13:29

## 2018-11-01 RX ADMIN — Medication 2 G: at 16:37

## 2018-11-01 RX ADMIN — GLYCOPYRROLATE 0.6 MG: 0.2 INJECTION INTRAMUSCULAR; INTRAVENOUS at 09:45

## 2018-11-01 NOTE — ANESTHESIA POSTPROCEDURE EVALUATION
Procedure(s): 
THORACIC LAMINOTOMY AND PLACEMENT OF SPINAL CORD STIMULATOR LEAD AND BATTERY/ ANS  NEED EXTRA SCRUB TECH. Anesthesia Post Evaluation Multimodal analgesia: multimodal analgesia used between 6 hours prior to anesthesia start to PACU discharge Patient location during evaluation: bedside Patient participation: complete - patient participated Level of consciousness: awake Pain management: adequate Airway patency: patent Anesthetic complications: no 
Cardiovascular status: acceptable and stable Respiratory status: acceptable and nasal cannula Hydration status: acceptable Visit Vitals /61 Pulse 68 Temp 37 °C (98.6 °F) Resp 9 Ht 5' 3.5\" (1.613 m) Wt 103.6 kg (228 lb 8 oz) SpO2 94% BMI 39.84 kg/m²

## 2018-11-01 NOTE — OP NOTES
[facility] REPORT    Ronald Dhillon  MR#: [id number]  : 1945  ACCOUNT #: [de-identified]   DATE OF SERVICE: 2018    SURGEON:  Sari Salas MD    ASSISTANT:  None. PREOPERATIVE DIAGNOSIS:  Chronic pain syndrome with lumbosacral neuritis. POSTOPERATIVE DIAGNOSIS:  Chronic pain syndrome with lumbosacral neuritis. PROCEDURE PERFORMED:    1.  T9-10 bilateral laminotomy and placement of [employer]ret paddle spinal cord stimulator over T8 and T9.  2.  Placement of non-rechargeable internal pulse generator from [employer] Jar[employer] into the left buttocks area. ANESTHESIA:  GETA. ESTIMATED BLOOD LOSS:  About 40 mL. FLUIDS:  A liter of crystalloid. SPECIMENS REMOVED:  None. IMPLANTS:  [employer]. Jarret. COMPLICATIONS:  None. INDICATIONS:  The patient is a 80-year-old female who has chronic pain syndrome and lumbosacral neuritis. She has been treated with multiple conservative measures including epidural blocks, pain medication. Nothing alleviates her pain on the long-term. She had a spinal cord stimulator trial, which was successful and she presents for permanent implant. PROCEDURE:  The patient was brought to the operating room. After administration of anesthesia, IV antibiotic and placement of with monitoring lines, she was positioned prone on the [place] [employer] frame. At this point surgeon called a timeout and confirmed the procedure to be performed, her allergy history and the fact that she had received her preoperative IV antibiotics. So when she has been prepped and draped, we had x-ray come in and identified the T9-T10 area. We marked it on the skin. We then made a midline incision over this, dissected down to the subcutaneous tissue to the deep fascia. Deep fascia was incised on either side of the spinous processes. We dissected down along the lamina out to the facet joint bilaterally.   We placed an angle curet in the interlaminar space to AP C-arm x-ray that showed we were at the T9-T10 area. Once we had retracted the soft tissue well enough, we removed the spinous process of T9 with a rongeur and made a laminotomy at T9-T9 with a #3 Kerrison. We then placed a trial lead. It seemed to go and then we tapped the permanent lead which was a [employer]. Jarret Penta lead. We tried to insert it. We take x-rays and that was continued to kick off to the right side, so we had to do a very large laminotomy bilaterally and way cephalad to remove spurs and eventually we were able to get the lead to lie down flat in the midline with coverage of both right and left side, we felt this was appropriate. So we sutured the lead with suture anchors to the spinous process of T10 in a 0 silk suture. Then, we irrigated the wound, suctioned it dry, cauterized any bleeders that we get find and we placed FloSeal in this wound and placed patties over this to get bleeding control. We turned our attention to the battery site. She had marked her back where she wears her pants and went several inches below that into the left buttocks and made a horizontal incision with a scalpel, dissected down with electrocautery, cauterizing the bleeders as we went until we encountered a fascial layer and then we create a battery pocket using electrocautery and blunt finger dissection and we cauterized the bleeders. We took the trial battery, fit it and it seemed to have an excellent fit, so it was removed and we then ran a tunneler from the thoracic site down to the battery site. We ran the leads through the tunneler out to the battery site and we left a loop of lead at the thoracic site for strain relief. We then irrigated this battery site thoroughly, suctioned it dry, checked for bleeders and cauterized them and then we packed it off. At this point, everybody at the table remove their outer gloves and donned new sterile outer gloves.   We then obtained the battery and we inserted the 2 ends of the lead into the battery, tightened it and tested it and the battery and lead system was working appropriately. We removed the packing, checked the battery site. No bleeding. We placed vancomycin powder in the wound site. We looped that lead under the battery and inserted into the battery site and sutured it down to the deep fascia with 2 stitches of 0 silk suture. I placed more vancomycin on top and then we were ready to close this wound. Subcutaneous tissue was closed with interrupted stitches of 2-0 Vicryl. The skin was closed with a running subcuticular stitch of 3-0 Prolene and Steri-Strips were applied. The Prolene was tied in a knot over the top and Steri-Strip down as well. At the thoracic site, we removed the patties. The bleeding seemed to be ceased. We placed some vancomycin powder in here and then the remaining FloSeal and then we were ready to close this wound. The deep fascia was closed with interrupted figure-of-eight stitch of #1 Vicryl. We then laid the loop of lead on top of the deep fascia and closed the subcutaneous tissue over this with interrupted stitches of 2-0 Vicryl and the skin was closed with a running subcuticular stitch of 3-0 Vicryl. Steri-Strips, dry sterile dressings were applied. Final   AP x-ray were obtained of the lead in place. At this point, dry sterile dressings were applied to both incisions. The patient was then rolled supine on to the recovery room bed, having tolerated the procedure well.       Pascale Blancas III, MD SEL / MICHEAL  D: 11/01/2018 10:05     T: 11/01/2018 13:47  JOB #: [id number]  CC: Sorin Dotson MD

## 2018-11-01 NOTE — PROGRESS NOTES
TRANSFER - IN REPORT: 
 
Verbal report received from PACU(name) on Marian [place]  being received from PACU(unit) for routine post - op Report consisted of patients Situation, Background, Assessment and  
Recommendations(SBAR). Information from the following report(s) SBAR, MAR and Recent Results was reviewed with the receiving nurse. Opportunity for questions and clarification was provided. Assessment to be completed upon patients arrival to unit and care assumed.

## 2018-11-01 NOTE — PROGRESS NOTES
This note will not be viewable in [address] Outreach follow up attempt. Per connect care patient is noted to be current admission. No outreach follow up indicated at this time. Patient will be reassigned pending discharge disposition.

## 2018-11-01 NOTE — PROGRESS NOTES
11/01/18 1339 Dual Skin Pressure Injury Assessment Spine (incision) Dressing to upper back and left hip. Dressing clean, dry, and intact. No other skin abnormalities noted.

## 2018-11-01 NOTE — ANESTHESIA PREPROCEDURE EVALUATION
Anesthetic History PONV Review of Systems / Medical History Patient summary reviewed and pertinent labs reviewed Pulmonary Sleep apnea: CPAP Comments: 3l oxygen now chronically Admitted 2 weeks ago for hypoxia; saw pulmonology who started her on oxygen at home. Also got an ECHO which showed moderate AS. Neuro/Psych Within defined limits Cardiovascular Hypertension: well controlled Exercise tolerance: <4 METS Comments: Pt was scheduled for this surgery 2 weeks ago and it was cancelled due to hypoxia (sats in the 80's on RA). Pt was admitted and seen by pulmonology (she had been followed by them previously). They r/o PE/pneumonia and felt this was an acute on chronic disease. She had been progressively inactive due to back pain. She was sent home on oxygen and her CPAP settings were rechecked. Cardiac ECHO showed normal EF and mod AS. GI/Hepatic/Renal 
  
GERD: well controlled Endo/Other Diabetes: well controlled Hypothyroidism: well controlled Morbid obesity and arthritis Other Findings Comments: Pt has appointments for follow up with pulmonology and cardiology in 2 weeks from admission 2 weeks ago. Physical Exam 
 
Airway Mallampati: II 
TM Distance: 4 - 6 cm Neck ROM: normal range of motion Mouth opening: Normal 
 
 Cardiovascular Regular rate and rhythm,  S1 and S2 normal,  no murmur, click, rub, or gallop Rhythm: regular Rate: normal 
 
 
 
 Dental 
 
Dentition: Full upper dentures Pulmonary Breath sounds clear to auscultation Abdominal 
Abdominal exam normal 
 
 
 Other Findings Anesthetic Plan ASA: 3 Anesthesia type: general 
 
 
 
 
Induction: Intravenous Anesthetic plan and risks discussed with: Patient Pt is doing well on home oxygen.   She was cleared for surgery by Dr. Paloma Montalvo in preassessment, but when no formal note for clearance was found for pulmonary; he opted to have her reevaluated this am.  She is wearing her oxygen at home with improvement in her symptoms of SOB. She is still not wearing her CPAP as she thinks the settings are too high. She has follow up appointment with pulmonary (Dr Shantal Marshall who has seen her for over 5 years) and cardiology. ( Her previous admission revealed low oxygen (40's) and findings consistant with mod AS on ECHO with normal EF.)  I have discussed this with the patient and Dr. Justino Ch who agree to proceed. Pt to be admitted overnight. She will continue her follow up with cardiology and pulmonology postop.

## 2018-11-01 NOTE — PERIOP NOTES
Patient non-compliant with cpap. Wears home o2 at 3L. Pt came out on 6lmp via face mask. Dilaudid was given sparingly as  Each dose caused pt is de-sat. Dr Mattie Sauer agreed it was mazariegos to spread out doses due to resp status.

## 2018-11-01 NOTE — PERIOP NOTES
TRANSFER - OUT REPORT: 
 
Verbal report given to Iad HENRY(name) on Marian Clancy  being transferred to 701(unit) for routine post - op Report consisted of patients Situation, Background, Assessment and  
Recommendations(SBAR). Information from the following report(s) SBAR, OR Summary and Procedure Summary was reviewed with the receiving nurse. Lines:  
Peripheral IV 11/01/18 Right Wrist (Active) Site Assessment Clean, dry, & intact 11/1/2018 10:38 AM  
Phlebitis Assessment 0 11/1/2018 10:38 AM  
Infiltration Assessment 0 11/1/2018 10:38 AM  
Dressing Status Clean, dry, & intact 11/1/2018 10:38 AM  
Dressing Type Tape;Transparent 11/1/2018 10:38 AM  
Hub Color/Line Status Green; Infusing 11/1/2018 10:38 AM  
  
 
Opportunity for questions and clarification was provided. Patient transported with: 
 O2 @ 4 liters VTE prophylaxis orders have been written for Marian Clancy. Patient and family given floor number and nurses name. Family updated re: pt status after security code verified.

## 2018-11-02 VITALS
BODY MASS INDEX: 39.01 KG/M2 | DIASTOLIC BLOOD PRESSURE: 62 MMHG | RESPIRATION RATE: 19 BRPM | HEART RATE: 64 BPM | WEIGHT: 228.5 LBS | OXYGEN SATURATION: 91 % | TEMPERATURE: 97.6 F | SYSTOLIC BLOOD PRESSURE: 114 MMHG | HEIGHT: 64 IN

## 2018-11-02 PROCEDURE — G8980 MOBILITY D/C STATUS: HCPCS

## 2018-11-02 PROCEDURE — 99218 HC RM OBSERVATION: CPT

## 2018-11-02 PROCEDURE — 97530 THERAPEUTIC ACTIVITIES: CPT

## 2018-11-02 PROCEDURE — G8979 MOBILITY GOAL STATUS: HCPCS

## 2018-11-02 PROCEDURE — 74011250636 HC RX REV CODE- 250/636: Performed by: ORTHOPAEDIC SURGERY

## 2018-11-02 PROCEDURE — G8978 MOBILITY CURRENT STATUS: HCPCS

## 2018-11-02 PROCEDURE — 97161 PT EVAL LOW COMPLEX 20 MIN: CPT

## 2018-11-02 PROCEDURE — 74011250637 HC RX REV CODE- 250/637: Performed by: ORTHOPAEDIC SURGERY

## 2018-11-02 RX ADMIN — Medication 2 G: at 05:59

## 2018-11-02 RX ADMIN — OXYCODONE HYDROCHLORIDE AND ACETAMINOPHEN 1 TABLET: 7.5; 325 TABLET ORAL at 05:58

## 2018-11-02 RX ADMIN — METFORMIN HYDROCHLORIDE 1000 MG: 500 TABLET ORAL at 09:02

## 2018-11-02 RX ADMIN — PAROXETINE HYDROCHLORIDE 20 MG: 20 TABLET, FILM COATED ORAL at 09:02

## 2018-11-02 RX ADMIN — Medication 1 AMPULE: at 09:02

## 2018-11-02 RX ADMIN — OXYCODONE HYDROCHLORIDE AND ACETAMINOPHEN 1 TABLET: 7.5; 325 TABLET ORAL at 11:14

## 2018-11-02 RX ADMIN — LOSARTAN POTASSIUM 100 MG: 50 TABLET ORAL at 09:02

## 2018-11-02 RX ADMIN — AMLODIPINE BESYLATE 10 MG: 10 TABLET ORAL at 09:02

## 2018-11-02 RX ADMIN — LEVOTHYROXINE SODIUM 50 MCG: 50 TABLET ORAL at 05:58

## 2018-11-02 RX ADMIN — CYCLOBENZAPRINE HYDROCHLORIDE 5 MG: 10 TABLET, FILM COATED ORAL at 09:05

## 2018-11-02 RX ADMIN — Medication 10 ML: at 05:58

## 2018-11-02 RX ADMIN — PANTOPRAZOLE SODIUM 40 MG: 40 TABLET, DELAYED RELEASE ORAL at 05:58

## 2018-11-02 RX ADMIN — OXYBUTYNIN CHLORIDE 5 MG: 5 TABLET ORAL at 09:02

## 2018-11-02 NOTE — PROGRESS NOTES
Problem: Pressure Injury - Risk of 
Goal: *Prevention of pressure injury Document Canelo Scale and appropriate interventions in the flowsheet. Outcome: Progressing Towards Goal 
Pressure Injury Interventions: 
Sensory Interventions: Assess changes in LOC Moisture Interventions: Offer toileting Q_hr, Absorbent underpads Activity Interventions: PT/OT evaluation, Increase time out of bed Mobility Interventions: HOB 30 degrees or less, PT/OT evaluation Nutrition Interventions: Offer support with meals,snacks and hydration Friction and Shear Interventions: Apply protective barrier, creams and emollients Problem: Falls - Risk of 
Goal: *Absence of Falls Document [facility] Fall Risk and appropriate interventions in the flowsheet. Outcome: Progressing Towards Goal 
Fall Risk Interventions: 
Mobility Interventions: Bed/chair exit alarm, Assess mobility with egress test, Communicate number of staff needed for ambulation/transfer, PT Consult for mobility concerns, OT consult for ADLs Medication Interventions: Bed/chair exit alarm, Evaluate medications/consider consulting pharmacy, Patient to call before getting OOB Elimination Interventions: Call light in reach, Bed/chair exit alarm, Toileting schedule/hourly rounds, Patient to call for help with toileting needs, Elevated toilet seat History of Falls Interventions: Bed/chair exit alarm, Consult care management for discharge planning

## 2018-11-02 NOTE — PROGRESS NOTES
Patient here for spinal cord stimulator insertion surgery per Dr. Trang Lloyd. Chart reviewed for discharge planning. No needs identified at this time. Please consult CM if any new issues arise.

## 2018-11-02 NOTE — PROGRESS NOTES
Pt's D/C instructions completed. Verbalized understanding of all instructions including diet, activity, s/sx to alert MD, medications, wound care, and f/u appointment. Family on way to get patient for discharge.

## 2018-11-02 NOTE — PROGRESS NOTES
Problem: Mobility Impaired (Adult and Pediatric) Goal: *Acute Goals and Plan of Care (Insert Text) N/A 
 
PHYSICAL THERAPY: Initial Assessment, Discharge, Treatment Day: Day of Assessment, AM 11/2/2018OBSERVATION: Hospital Day: 2 [place] / [place] / Product Type: Medicare /  
  
NAME/AGE/GENDER: Kyle Christensen is a 68 y.o. female PRIMARY DIAGNOSIS: Lumbosacral neuritis [M54.17] Chronic pain syndrome [G89.4] Chronic pain syndrome Chronic pain syndrome Procedure(s) (LRB): 
THORACIC LAMINOTOMY AND PLACEMENT OF SPINAL CORD STIMULATOR LEAD AND BATTERY/ ANS  NEED EXTRA SCRUB TECH (N/A) 1 Day Post-Op ICD-10: Treatment Diagnosis: · Difficulty in walking, Not elsewhere classified (R26.2) Precaution/Allergies: 
Patient has no known allergies. ASSESSMENT:  
Ms. Moriah Perez is a pleasant 68 y.o female s/p THORACIC LAMINOTOMY AND PLACEMENT OF SPINAL CORD STIMULATOR LEAD AND BATTERY/ ANS  NEED EXTRA SCRUB TECH (N/A). Pt is sitting EOB upon contact, A&O x 4, and agreeable to PT Evaluation. Pt states she lives with her  and daughter, ambulates household and community distances with rollator, no recent falls, doesn't drive, and is on 3L O2 all the time. Pt reports no pain currently. Educated pt on spinal precautions and log roll technique. Pt demonstrates good sitting balance. Pt performed STS with SBA and RW, demonstrating good standing balance. Pt ambulated 250ft in hallway with SBA and RW, demonstrating slow, shuffling gait with narrow MONIQUE and forward flexed posture but otherwise steady. Pt ambulated additional 100ft with cuing for upright posture and RW management (pt pushes RW too far from body) before returning to bedside chair. B LE AROM and strength WFL. Discussed DC needs and concerns and safety awareness around the house and reviewed spinal precautions at end of session.  Pt states she feels back at her baseline and doesn't need further PT services. Pt will be discharged at this time from PT services, no DC needs anticipated. This section established at most recent assessment PROBLEM LIST (Impairments causing functional limitations): 1. Decreased Ambulation Ability/Technique 2. Decreased Knowledge of Precautions INTERVENTIONS PLANNED: (Benefits and precautions of physical therapy have been discussed with the patient.) 1. N/A  
TREATMENT PLAN: Frequency/Duration: one time visit RECOMMENDED REHABILITATION/EQUIPMENT: (at time of discharge pending progress): Due to the probability of continued deficits (see above) this patient will not likely need continued skilled physical therapy after discharge. Equipment:  
? None at this time HISTORY:  
History of Present Injury/Illness (Reason for Referral): 
Procedure(s) (LRB): 
THORACIC LAMINOTOMY AND PLACEMENT OF SPINAL CORD STIMULATOR LEAD AND BATTERY/ ANS  NEED EXTRA SCRUB TECH (N/A) Past Medical History/Comorbidities:  
Ms. Moriah Perez  has a past medical history of Anxiety and depression, Arthritis, Chronic pain, Diabetes mellitus, type 2 ([address] GERD (gastroesophageal reflux disease), Hypertension, LALA on CPAP, Oxygen dependent, PONV (postoperative nausea and vomiting), Pulmonary nodule, and Thyroid disease. Ms. Moriah Perez  has a past surgical history that includes hx carpal tunnel release (Right, 1988); hx shoulder arthroscopy (Right, 1989); hx knee arthroscopy (2008); hx orthopaedic (Bilateral); COLONOSCOPY  BMI 41 (N/A, 7/27/2017); and ESOPHAGOGASTRODUODENOSCOPY (EGD) (N/A, 7/15/2017). Social History/Living Environment:  
Home Environment: Private residence # Steps to Enter: 0 One/Two Story Residence: One story Living Alone: No 
Support Systems: Spouse/Significant Other/Partner, Child(lorna) Patient Expects to be Discharged to[de-identified] Private residence Current DME Used/Available at Home: vandana Cassidy, [address] [facility] chair Tub or Shower Type: Shower Prior Level of Function/Work/Activity: 
Lives with  and daughter, ambulates with rollator, 3L supplemental O2 all the time, no falls, doesn't drive Number of Personal Factors/Comorbidities that affect the Plan of Care: 3+: HIGH COMPLEXITY EXAMINATION:  
Most Recent Physical Functioning:  
Gross Assessment: 
AROM: Within functional limits Strength: Within functional limits Coordination: Within functional limits Posture: 
  
Balance: 
Sitting: Intact Standing: Intact; With support Bed Mobility: 
  
Wheelchair Mobility: 
  
Transfers: 
Sit to Stand: Stand-by assistance Stand to Sit: Stand-by assistance Gait: 
  
Base of Support: Narrowed Speed/Susan: Shuffled; Slow Step Length: Left shortened;Right shortened Gait Abnormalities: Decreased step clearance; Other(forward flexed posture) Distance (ft): 350 Feet (ft) Assistive Device: Walker, rolling Ambulation - Level of Assistance: Stand-by assistance Interventions: Tactile cues; Verbal cues; Visual/Demos Body Structures Involved: 1. Nerves 2. Muscles Body Functions Affected: 1. Sensory/Pain 2. Neuromusculoskeletal 
3. Movement Related Activities and Participation Affected: 1. Domestic Life 2. Interpersonal Interactions and Relationships 3. Community, Social and [place] Number of elements that affect the Plan of Care: 4+: HIGH COMPLEXITY CLINICAL PRESENTATION:  
Presentation: Stable and uncomplicated: LOW COMPLEXITY CLINICAL DECISION MAKIN [facility] [address] [address] AM-PAC 6 Clicks Basic Mobility Inpatient Short Form How much difficulty does the patient currently have. .. Unable A Lot A Little None 1. Turning over in bed (including adjusting bedclothes, sheets and blankets)? [] 1   [] 2   [] 3   [x] 4  
2. Sitting down on and standing up from a chair with arms ( e.g., wheelchair, bedside commode, etc.)   [] 1   [] 2   [] 3   [x] 4 3. Moving from lying on back to sitting on the side of the bed? [] 1   [] 2   [] 3   [x] 4 How much help from another person does the patient currently need. .. Total A Lot A Little None 4. Moving to and from a bed to a chair (including a wheelchair)? [] 1   [] 2   [] 3   [x] 4  
5. Need to walk in hospital room? [] 1   [] 2   [] 3   [x] 4  
6. Climbing 3-5 steps with a railing? [] 1   [] 2   [x] 3   [] 4  
© 2007, Trustees of [facility] [employer], under license to [employer]. All rights reserved Score:  Initial: 23 Most Recent: X (Date: -- ) Interpretation of Tool:  Represents activities that are increasingly more difficult (i.e. Bed mobility, Transfers, Gait). Score 24 23 22-20 19-15 14-10 9-7 6 Modifier CH CI CJ CK CL CM CN   
 
? Mobility - Walking and Moving Around:  
  - CURRENT STATUS: CI - 1%-19% impaired, limited or restricted  - GOAL STATUS: CI - 1%-19% impaired, limited or restricted  - D/C STATUS:  CI - 1%-19% impaired, limited or restricted [place] / [place] / Product Type: Medicare /   
  
Use of outcome tool(s) and clinical judgement create a POC that gives a: Clear prediction of patient's progress: LOW COMPLEXITY  
  
 
 
 
TREATMENT:  
(In addition to Assessment/Re-Assessment sessions the following treatments were rendered) Pre-treatment Symptoms/Complaints:  \"I'm ok\" Pain: Initial:  
Pain Intensity 1: 0  Post Session:  0/10 In addition to PT Evaluation: 
Therapeutic Activity: (    8 minutes): Therapeutic activities including Bed transfers, Chair transfers, Ambulation on level ground and education/cuing for log roll technique, safety awareness, spinal precautions, and RW management to improve mobility, balance and coordination. Required minimal Tactile cues; Verbal cues; Visual/Demos to promote dynamic balance in standing. Braces/Orthotics/Lines/Etc:  
· IV 
· O2 Device: Nasal cannula Treatment/Session Assessment:   
· Response to Treatment:  See above · Interdisciplinary Collaboration:  
o Physical Therapist 
o Registered Nurse · After treatment position/precautions:  
o Up in chair 
o Bed/Chair-wheels locked 
o Bed in low position 
o Call light within reach Total Treatment Duration: PT Patient Time In/Time Out Time In: 5037 Time Out: 1005 Vonnie Valero PT

## 2018-11-02 NOTE — PROGRESS NOTES
LATE NOTE: 
Medicare patient discharged prior to delivery of MOON. Patient's Care Managers notified.

## 2018-11-02 NOTE — DISCHARGE INSTRUCTIONS
Spinal Cord Stimulator:  What to Expect at [address] can expect your back to feel stiff or sore after surgery. This should improve in the weeks after surgery. You may have trouble sitting or standing in one position for very long and may need pain medicine the first week after your surgery. It may take 4 to 6 weeks to get back to doing your usual activities. You will have restrictions of no bending, lifting, twisting or raising your arms overhead for 6 weeks after your surgery to make sure the implanted stimulator does not move. This care sheet gives you a general idea about how long it will take for you to recover. But each person recovers at a different pace. Follow the steps below to get better as quickly as possible. How can you care for yourself at home? Activity  · Rest when you feel tired. Getting enough sleep will help you recover. · Try to walk each day. Start by walking a little more than you did the day before. Bit by bit, increase the amount you walk. Walking boosts blood flow and helps prevent pneumonia and constipation. Walking may also decrease your muscle soreness after surgery. · You need to avoid lifting anything that would cause excessive strain on your back. This includes heavy grocery bags and milk containers, a heavy briefcase or backpack, cat litter or dog food bags, a child, or a vacuum . · Avoid strenuous activities, such as bicycle riding, jogging, weight lifting, or aerobic exercise, until your doctor says it is okay. · Do not drive after your surgery until your doctor says it is okay. Usually 10-14 days. · Avoid riding in a car for more than 30 minutes at a time for 2 to 4 weeks after surgery. If you must ride in a car for a longer distance, stop often to walk and stretch your legs. · Try to change your position about every 30 minutes while sitting or standing. This will help decrease your back pain while you are healing.   · You may have sex as soon as you feel able, but avoid positions that put stress on your back or cause pain. · You may shower the first day after surgery with the waterproof tegaderm dressings on your incisions. No tub baths. After 6-7 days you may remove your dressings. Keep your incisions clean and dry after bathing. Diet  · You can eat your normal diet. If your stomach is upset, try bland, low-fat foods like plain rice, broiled chicken, toast, and yogurt. · Drink plenty of fluids (unless your doctor tells you not to). · You may notice that your bowel movements are not regular right after your surgery. This is common. Try to avoid constipation and straining with bowel movements. You may want to take a fiber supplement every day. If you have not had a bowel movement after a couple of days, ask your doctor about taking a mild laxative. Medicines  · Take pain medicines exactly as directed. ¨ If the doctor gave you a prescription medicine for pain, take it as prescribed. ¨ If you are not taking a prescription pain medicine, take an over-the-counter medicine such as acetaminophen (Tylenol), ibuprofen (Advil, Motrin), or naproxen (Aleve). Read and follow all instructions on the label. ¨ Do not take two or more pain medicines at the same time unless the doctor told you to. Many pain medicines have acetaminophen, which is Tylenol. Too much acetaminophen (Tylenol) can be harmful. · If your doctor prescribed antibiotics, take them as directed. Do not stop taking them just because you feel better. You need to take the full course of antibiotics. · If you think your pain pill is making you sick to your stomach:  ¨ Take your pills after meals (unless your doctor has told you not to). ¨ Ask your doctor for a different pain pill. Incision care  · Your incisions will be covered with steri-strips, gauze and tegaderm. Leave the dressings on for 6-7 days. You may change the dressing if there is drainage.  After 6-7 days, you may remove the dressings and leave your incisions open. · If you have strips of tape on the cut (incision) the doctor made, leave the tape on for a week or until it falls off. · DO NOT REMOVE the blue suture that is taped down at the battery site. This will be removed when you return to your doctor. · Keep the area clean and dry. You may cover it with a gauze bandage if it weeps or rubs against clothing. Other instructions  · Use a heating pad, hot water bottle, or gentle massage on your back to reduce stiffness. Follow-up care is a key part of your treatment and safety. Be sure to make and go to all appointments, and call your doctor if you are having problems. Its also a good idea to know your test results and keep a list of the medicines you take. When should you call for help? Call 911 anytime you think you may need emergency care. For example, call if:  · You pass out (lose consciousness). · You have sudden chest pain and shortness of breath, or you cough up blood. · You lose bladder or bowel control. · One or both legs suddenly feel weak or numb. Call your doctor now or seek immediate medical care if:  · You have pain that does not get better after you take pain pills. · You have a headache that does not get better when you take medicine for it. · You have loose stitches, or your incision comes open. · You have blood or fluid draining from the incision. · You have signs of infection, such as:  ¨ Increased pain, swelling, warmth, or redness. ¨ Pus draining from the incision. ¨ Swollen lymph nodes in your neck, armpits, or groin. ¨ A fever. ¨ Red streaks leading from the incision. Watch closely for any changes in your health, and be sure to contact your doctor if:  · You have new numbness or tingling in your legs. · You have new pain or weakness in your legs. · You do not have a bowel movement after taking a laxative. - Where can you learn more? Go to [employer].   Enter H876 in the search box to learn more about \"Lumbar Laminectomy for Spinal Stenosis: What to Expect at Home. \"   © 5982-2200 [employer]. Care instructions adapted under license by Tessa Kiran (which disclaims liability or warranty for this information). This care instruction is for use with your licensed healthcare professional. If you have questions about a medical condition or this instruction, always ask your healthcare professional. Maribell Mujicacaryn any warranty or liability for your use of this information. DISCHARGE SUMMARY from Nurse    PATIENT INSTRUCTIONS:    After general anesthesia or intravenous sedation, for 24 hours or while taking prescription Narcotics:  · Limit your activities  · Do not drive and operate hazardous machinery  · Do not make important personal or business decisions  · Do  not drink alcoholic beverages  · If you have not urinated within 8 hours after discharge, please contact your surgeon on call. Report the following to your surgeon:  · Excessive pain, swelling, redness or odor of or around the surgical area  · Temperature over 100.5  · Nausea and vomiting lasting longer than 4 hours or if unable to take medications  · Any signs of decreased circulation or nerve impairment to extremity: change in color, persistent  numbness, tingling, coldness or increase pain  · Any questions    What to do at Home:  Recommended activity: Per MD instructions as above    If you experience any of the following symptoms fever > 100.5, nausea, vomiting, increased tingling and numbness in legs and arms, pain, chest pain and/or shortness of breath to ER please follow up with MD.    *  Please give a list of your current medications to your Primary Care Provider. *  Please update this list whenever your medications are discontinued, doses are      changed, or new medications (including over-the-counter products) are added.     *  Please carry medication information at all times in case of emergency situations. These are general instructions for a healthy lifestyle:    No smoking/ No tobacco products/ Avoid exposure to second hand smoke  Surgeon General's Warning:  Quitting smoking now greatly reduces serious risk to your health. Obesity, smoking, and sedentary lifestyle greatly increases your risk for illness    A healthy diet, regular physical exercise & weight monitoring are important for maintaining a healthy lifestyle    You may be retaining fluid if you have a history of heart failure or if you experience any of the following symptoms:  Weight gain of 3 pounds or more overnight or 5 pounds in a week, increased swelling in our hands or feet or shortness of breath while lying flat in bed. Please call your doctor as soon as you notice any of these symptoms; do not wait until your next office visit. Recognize signs and symptoms of STROKE:    F-face looks uneven    A-arms unable to move or move unevenly    S-speech slurred or non-existent    T-time-call 911 as soon as signs and symptoms begin-DO NOT go       Back to bed or wait to see if you get better-TIME IS BRAIN. Warning Signs of HEART ATTACK     Call 911 if you have these symptoms:   Chest discomfort. Most heart attacks involve discomfort in the center of the chest that lasts more than a few minutes, or that goes away and comes back. It can feel like uncomfortable pressure, squeezing, fullness, or pain.  Discomfort in other areas of the upper body. Symptoms can include pain or discomfort in one or both arms, the back, neck, jaw, or stomach.  Shortness of breath with or without chest discomfort.  Other signs may include breaking out in a cold sweat, nausea, or lightheadedness. Don't wait more than five minutes to call 911 - MINUTES MATTER! Fast action can save your life. Calling 911 is almost always the fastest way to get lifesaving treatment.  Emergency Medical Services staff can begin treatment when they arrive -- up to an hour sooner than if someone gets to the hospital by car. The discharge information has been reviewed with the patient. The patient verbalized understanding. Discharge medications reviewed with the patient and appropriate educational materials and side effects teaching were provided.   ___________________________________________________________________________________________________________________________________

## 2018-11-02 NOTE — PROGRESS NOTES
Pt discharged home with family care. Discharged instructions provided by discharge nurse. Pt assisted to lobby in w/c.

## 2018-11-27 PROBLEM — J96.11 CHRONIC RESPIRATORY FAILURE WITH HYPOXIA (HCC): Chronic | Status: ACTIVE | Noted: 2018-10-11

## 2018-11-27 PROBLEM — E66.01 SEVERE OBESITY (HCC): Status: ACTIVE | Noted: 2018-11-27

## 2019-01-17 NOTE — BRIEF OP NOTE
BRIEF OPERATIVE NOTE Date of Procedure: 11/1/2018 Preoperative Diagnosis: Lumbosacral neuritis [M54.17] Chronic pain syndrome [G89.4] Postoperative Diagnosis: Lumbosacral neuritis [M54.17] Chronic pain syndrome [G89.4] Procedure(s): 
THORACIC LAMINOTOMY AND PLACEMENT OF SPINAL CORD STIMULATOR LEAD AND BATTERY/ ANS  NEED EXTRA SCRUB TECH Surgeon(s) and Role: 
   * Noemi Dodd MD - Primary Surgical Assistant: none Surgical Staff: 
Circ-1: Jaquelin Hubbard RN Scrub Tech-1: Laurence Griffin Scrub Tech-2: Mayte Allan Scrub Tech-3: Radha Hong Event Time In Time Out Incision Start 0830 Incision Close 0170 Anesthesia: General  
Estimated Blood Loss: 40cc Specimens: * No specimens in log * Findings: none Complications: none Implants:  
Implant Name Type Inv. Item Serial No.  Lot No. LRB No. Used Action LEAD STIM PENTA 3MM 60CM -- [id number] - [id number]  LEAD STIM PENTA 3MM 60CM -- [id number] [employer]  N/A 1 Implanted  
(D)GEN IPG DL QUAT SNGL 7. 5AH -- DUPE USE ITEM [id number] - [id number]  (D)GEN IPG DL QUAT SNGL 7. 5AH -- DUPE USE ITEM [id number] [employer]  N/A 1 Implanted Miguel Baez presents today for Chief Complaint Patient presents with  
[facility] Follow Up  
  4-6 weeks after ablation  Chest Pain  
  intermittent discomfort, middle of chest   
 Dizziness  
  sometimes  Palpitations  
  sometimes Miguel Baez preferred language for health care discussion is english/other. Is someone accompanying this pt? No  
 
Is the patient using any DME equipment during OV? No  
 
Depression Screening: No flowsheet data found. Learning Assessment: 
No flowsheet data found. Abuse Screening: No flowsheet data found. Fall Risk No flowsheet data found. Pt currently taking Anticoagulant therapy? Eliquis Coordination of Care: 1. Have you been to the ER, urgent care clinic since your last visit? Hospitalized since your last visit? 12/11/18 - 12/12 for ablation 2. Have you seen or consulted any other health care providers outside of the [address] [place] [address] since your last visit? Include any pap smears or colon screening.  No

## 2019-02-25 ENCOUNTER — HOSPITAL ENCOUNTER (OUTPATIENT)
Dept: SLEEP MEDICINE | Age: 74
Discharge: HOME | End: 2019-02-25
Payer: MEDICARE

## 2019-02-25 PROCEDURE — 95810 POLYSOM 6/> YRS 4/> PARAM: CPT

## 2020-01-03 PROBLEM — I06.0 RHEUMATIC AORTIC STENOSIS: Status: ACTIVE | Noted: 2018-10-12

## 2022-03-18 PROBLEM — E66.01 SEVERE OBESITY (HCC): Status: ACTIVE | Noted: 2018-11-27

## 2022-03-18 PROBLEM — G89.4 CHRONIC PAIN SYNDROME: Status: ACTIVE | Noted: 2018-10-11

## 2022-03-19 PROBLEM — I06.0 RHEUMATIC AORTIC STENOSIS: Status: ACTIVE | Noted: 2018-10-12

## 2022-03-19 PROBLEM — D72.829 LEUKOCYTOSIS: Status: ACTIVE | Noted: 2018-10-11

## 2022-03-19 PROBLEM — R10.9 ABDOMINAL PAIN: Status: ACTIVE | Noted: 2017-07-13

## 2022-03-20 PROBLEM — J96.11 CHRONIC RESPIRATORY FAILURE WITH HYPOXIA (HCC): Status: ACTIVE | Noted: 2018-10-11

## 2022-03-20 PROBLEM — M54.17 LUMBOSACRAL NEURITIS: Status: ACTIVE | Noted: 2018-10-11

## 2022-03-20 PROBLEM — D64.9 ANEMIA: Status: ACTIVE | Noted: 2017-07-13

## 2022-03-20 PROBLEM — R35.0 URINARY FREQUENCY: Status: ACTIVE | Noted: 2018-10-11

## 2022-06-07 ENCOUNTER — TELEPHONE (OUTPATIENT)
Dept: PULMONOLOGY | Age: 77
End: 2022-06-07

## 2022-06-07 ENCOUNTER — OFFICE VISIT (OUTPATIENT)
Dept: PULMONOLOGY | Age: 77
End: 2022-06-07
Payer: MEDICARE

## 2022-06-07 VITALS
BODY MASS INDEX: 37.65 KG/M2 | DIASTOLIC BLOOD PRESSURE: 80 MMHG | SYSTOLIC BLOOD PRESSURE: 120 MMHG | TEMPERATURE: 98 F | HEIGHT: 65 IN | OXYGEN SATURATION: 86 % | RESPIRATION RATE: 20 BRPM | WEIGHT: 226 LBS | HEART RATE: 69 BPM

## 2022-06-07 DIAGNOSIS — J81.0 ACUTE PULMONARY EDEMA (HCC): Primary | ICD-10-CM

## 2022-06-07 DIAGNOSIS — I50.9 CONGESTIVE HEART FAILURE, UNSPECIFIED HF CHRONICITY, UNSPECIFIED HEART FAILURE TYPE (HCC): ICD-10-CM

## 2022-06-07 DIAGNOSIS — R91.8 PULMONARY INFILTRATES: ICD-10-CM

## 2022-06-07 DIAGNOSIS — R09.02 HYPOXEMIA: ICD-10-CM

## 2022-06-07 DIAGNOSIS — I27.20 PULMONARY HYPERTENSION (HCC): ICD-10-CM

## 2022-06-07 PROCEDURE — 1036F TOBACCO NON-USER: CPT | Performed by: NURSE PRACTITIONER

## 2022-06-07 PROCEDURE — G8400 PT W/DXA NO RESULTS DOC: HCPCS | Performed by: NURSE PRACTITIONER

## 2022-06-07 PROCEDURE — 1123F ACP DISCUSS/DSCN MKR DOCD: CPT | Performed by: NURSE PRACTITIONER

## 2022-06-07 PROCEDURE — 99215 OFFICE O/P EST HI 40 MIN: CPT | Performed by: NURSE PRACTITIONER

## 2022-06-07 PROCEDURE — 1090F PRES/ABSN URINE INCON ASSESS: CPT | Performed by: NURSE PRACTITIONER

## 2022-06-07 PROCEDURE — G8427 DOCREV CUR MEDS BY ELIG CLIN: HCPCS | Performed by: NURSE PRACTITIONER

## 2022-06-07 PROCEDURE — G8417 CALC BMI ABV UP PARAM F/U: HCPCS | Performed by: NURSE PRACTITIONER

## 2022-06-07 RX ORDER — AZITHROMYCIN 250 MG/1
250 TABLET, FILM COATED ORAL DAILY
COMMUNITY
End: 2022-07-20 | Stop reason: ALTCHOICE

## 2022-06-07 RX ORDER — FLUTICASONE PROPIONATE 50 MCG
1 SPRAY, SUSPENSION (ML) NASAL AS NEEDED
COMMUNITY
End: 2022-10-07 | Stop reason: ALTCHOICE

## 2022-06-07 RX ORDER — ALBUTEROL SULFATE 90 UG/1
2 AEROSOL, METERED RESPIRATORY (INHALATION) EVERY 6 HOURS PRN
COMMUNITY
End: 2022-09-26 | Stop reason: ALTCHOICE

## 2022-06-07 RX ORDER — AMOXICILLIN AND CLAVULANATE POTASSIUM 875; 125 MG/1; MG/1
1 TABLET, FILM COATED ORAL 2 TIMES DAILY
COMMUNITY
End: 2022-07-20 | Stop reason: ALTCHOICE

## 2022-06-07 RX ORDER — DULOXETIN HYDROCHLORIDE 60 MG/1
60 CAPSULE, DELAYED RELEASE ORAL DAILY
COMMUNITY
End: 2022-10-07 | Stop reason: ALTCHOICE

## 2022-06-07 RX ORDER — FLUTICASONE FUROATE AND VILANTEROL TRIFENATATE 100; 25 UG/1; UG/1
1 POWDER RESPIRATORY (INHALATION) DAILY
COMMUNITY
End: 2022-09-26 | Stop reason: ALTCHOICE

## 2022-06-07 RX ORDER — FUROSEMIDE 40 MG/1
40 TABLET ORAL DAILY
Qty: 30 TABLET | Refills: 0 | Status: SHIPPED | OUTPATIENT
Start: 2022-06-07 | End: 2022-06-23 | Stop reason: SDUPTHER

## 2022-06-07 RX ORDER — POTASSIUM CHLORIDE 750 MG/1
10 TABLET, EXTENDED RELEASE ORAL DAILY
Qty: 30 TABLET | Refills: 0 | Status: SHIPPED | OUTPATIENT
Start: 2022-06-07 | End: 2022-06-23 | Stop reason: SDUPTHER

## 2022-06-07 ASSESSMENT — ENCOUNTER SYMPTOMS
SHORTNESS OF BREATH: 1
NAUSEA: 0
ABDOMINAL PAIN: 0
VOMITING: 0
DIARRHEA: 0
CONSTIPATION: 0

## 2022-06-07 NOTE — PROGRESS NOTES
[address] [facility], [address] [facility]  [phone]        Name:  Eden Marte  YOB: 1945  MRN:  [id number]    Office visit  6/7/2022      Chief Complaint   Patient presents with    Shortness of Breath           HISTORY OF PRESENT ILLNESS:  The patient is a 68year old female who is seen as an urgent work in appointment for acute respiratory failure with hypoxemia. She is accompanied by her  and their daughter, Sudha Cooper. Patient has a history of suspected pulmonary hypertension, but has never completed work up. Has had negative PSG in the past and is maintained on nocturnal O2 only. Over the past 2 weeks has had chest congestion. This started out as \"sinus\". Has been coughing up grayish secretions. No fever or chills, but has been extremely fatigued and very short of breath. Saw her PCP on 6/2/22. O2 sat was 82-83% on room air. Recommended being admitted, but patient declined because of fear of getting Covid. Was started on azithro and Augmentin. Also given nebulizer treatment and Breo. Continues to have chest congestion. No LE edema. No orthopnea. Covid swab was negative at PCP. CXR done 6/2/22 revealed bilateral opacities consistent with infectious etiology or edema. Arrived today without O2. O2 sat 86% on room air. ASSESSMENT/PLAN:  (Medical Decision Making)  Below is the assessment and plan developed based on review of pertinent history, physical exam, labs, studies, and medications. Encounter Diagnoses   Name Primary?  Acute pulmonary edema (HCC)  --start Lasix 40 mg qd  --start KCL 10 mEq qd   Yes    Congestive heart failure, unspecified HF chronicity, unspecified heart failure type ([address]  --repeat echo soon       Pulmonary hypertension ([address]  --needs repeat echo. If RVSP elevated will need work up completed.  Hypoxemia  --start O2 at 3 lpm ([facility]).  Pulmonary infiltrates  --??  Infectious or edema--WBC slightly elevated. Long discussion with patient and family. Patient does not want to go to hospital.  She agrees to wear O2 continuously, complete antibiotics, and start Lasix and KCL as above. Also agrees to go to ER if condition worsens and/or if O2 sats cannot be maintained at 90% or higher on O2. Orders Placed This Encounter   Procedures    XR CHEST (2 VW)     Standing Status:   Future     Standing Expiration Date:   2023     Order Specific Question:   Reason for exam:     Answer:   See Diagnosis    Basic Metabolic Panel     Standing Status:   Future     Standing Expiration Date:   2023    Ambulatory referral to Cardiology     Referral Priority:   Routine     Referral Type:   Consult for Advice and Opinion     Referral Reason:   Specialty Services Required     Requested Specialty:   Cardiology     Number of Visits Requested:   1    DME - DURABLE MEDICAL EQUIPMENT     Initiate O2 3 LPM w/ continuous flow via Nasal Canula    Resting Room Air Sat: 86%    Room Air Sat while ambulatin%    Ambulatory Sat on 3 LPM 92  %    Duration: Lifetime    (X)  Stationary oxygen concentrator  (X)  Portable oxygen system to use when away from stationary unit  (X) Contents for portable system      Orders Placed This Encounter   Medications    furosemide (LASIX) 40 MG tablet     Sig: Take 1 tablet by mouth daily     Dispense:  30 tablet     Refill:  0    potassium chloride (KLOR-CON M) 10 MEQ extended release tablet     Sig: Take 1 tablet by mouth daily     Dispense:  30 tablet     Refill:  0      Follow up with Dr. Carlos A Cruz in 2 weeks as planned with CXR and BMP. Collaborating physician is Dr. Cheri Gee. SH      Total time spent was 45 minutes. This time includes chart prep, review of tests/procedures, review of other provider's notes, documentation, and counseling patient regarding disease process and medications.             Nile Hickey, NP, APRN - CNP  Electronically signed          ~~~~~~~~~~~~~~~~~~~~~~~~~~~~~~~~~~~~~~~~  REFERENCE INFORMATION    Past Medical History:   Diagnosis Date    Anxiety and depression     since back problems--- well managed with medication    Arthritis     OA hands    Chronic pain     OA    Diabetes mellitus, type 2 ([facility]     oral agents: checks BG 1X weekly avereage 100; A1C 6.5 (7/14/17); denies hypo    GERD (gastroesophageal reflux disease)     omeprazole- denies hiatal hernia- uses 2-3 extra pillows to prevent reflux- states daily reflux up throat     Hypertension     medication controlled    MISSY on CPAP     does not wear CPAP wears continuos oxygen    Oxygen dependent     PONV (postoperative nausea and vomiting)     states IV zofran relieves    Pulmonary nodule     stable, no longer monitored    Thyroid disease     hypo       Past Surgical History:   Procedure Laterality Date    CARPAL TUNNEL RELEASE Right 1988    COLONOSCOPY N/A 7/27/2017    COLONOSCOPY  BMI 41 performed by Hilary Maurice MD at [address] [place] [address] ARTHROSCOPY  2008    Left knee partial replacement    ORTHOPEDIC SURGERY Bilateral     carpal tunnel    SHOULDER ARTHROSCOPY Right 1989    Rotator cuff       Family History   Problem Relation Age of Onset    Cancer Mother         endometrial    Diabetes Brother     Heart Disease Brother     Heart Disease Brother     Diabetes Brother     Hypertension Brother     Hypertension Brother        Social History     Socioeconomic History    Marital status:      Spouse name: Not on file    Number of children: Not on file    Years of education: Not on file    Highest education level: Not on file   Occupational History    Not on file   Tobacco Use    Smoking status: Never Smoker    Smokeless tobacco: Never Used   Substance and Sexual Activity    Alcohol use: No    Drug use: No    Sexual activity: Not on file   Other Topics Concern    Not on file   Social History Narrative     and lives with . Social Determinants of Health     Financial Resource Strain:     Difficulty of Paying Living Expenses: Not on file   Food Insecurity:     Worried About Running Out of Food in the Last Year: Not on file    Jaxon of Food in the Last Year: Not on file   Transportation Needs:     Lack of Transportation (Medical): Not on file    Lack of Transportation (Non-Medical):  Not on file   Physical Activity:     Days of Exercise per Week: Not on file    Minutes of Exercise per Session: Not on file   Stress:     Feeling of Stress : Not on file   Social Connections:     Frequency of Communication with Friends and Family: Not on file    Frequency of Social Gatherings with Friends and Family: Not on file    Attends Congregation Services: Not on file    Active Member of [address] [place] [address] [employer] or Organizations: Not on file    Attends Club or Organization Meetings: Not on file    Marital Status: Not on file   Intimate Partner Violence:     Fear of Current or Ex-Partner: Not on file    Emotionally Abused: Not on file    Physically Abused: Not on file    Sexually Abused: Not on file   Housing Stability:     Unable to Pay for Housing in the Last Year: Not on file    Number of [place] in the Last Year: Not on file    Unstable Housing in the Last Year: Not on file       Patient Active Problem List   Diagnosis    Severe obesity ([address]    Chronic pain syndrome    Neuropathy    Chest pain    Dyspnea    Abdominal pain    Restless leg syndrome    Leukocytosis    Diabetes mellitus ([facility]    Rheumatic aortic stenosis    Pulmonary hypertension (HCC)    Urinary frequency    Anemia    Lumbosacral neuritis    Chronic respiratory failure with hypoxia (HCC)          Allergies   Allergen Reactions    Gabapentin Nausea And Vomiting and Other (See Comments)     Anxiety and GI upset  Anxiety and GI upset         Current Outpatient Medications   Medication Sig    azithromycin (ZITHROMAX) 250 MG tablet Take 250 mg by mouth daily    amoxicillin-clavulanate (AUGMENTIN) 875-125 MG per tablet Take 1 tablet by mouth 2 times daily    albuterol sulfate HFA (PROVENTIL;VENTOLIN;PROAIR) 108 (90 Base) MCG/ACT inhaler Inhale 2 puffs into the lungs every 6 hours as needed for Wheezing    fluticasone (FLONASE) 50 MCG/ACT nasal spray 1 spray by Each Nostril route daily    fluticasone-vilanterol (BREO ELLIPTA) 100-25 MCG/INH AEPB inhaler Inhale 1 puff into the lungs daily    DULoxetine (CYMBALTA) 60 MG extended release capsule Take 60 mg by mouth daily    furosemide (LASIX) 40 MG tablet Take 1 tablet by mouth daily    potassium chloride (KLOR-CON M) 10 MEQ extended release tablet Take 1 tablet by mouth daily    OXYGEN 4.5 L if O2 falls below 90    amLODIPine (NORVASC) 10 MG tablet Take 10 mg by mouth daily    vitamin D3 (CHOLECALCIFEROL) 125 MCG (5000 UT) TABS tablet Take by mouth daily    cyanocobalamin 2000 MCG tablet Take 2,000 mcg by mouth daily    levothyroxine (SYNTHROID) 50 MCG tablet Take 50 mcg by mouth every morning (before breakfast)    losartan (COZAAR) 100 MG tablet Take 100 mg by mouth    lovastatin (MEVACOR) 40 MG tablet Take 40 mg by mouth    metFORMIN (GLUCOPHAGE) 500 MG tablet Take 1,000 mg by mouth 2 times daily (with meals)    nadolol (CORGARD) 80 MG tablet Take 80 mg by mouth    omeprazole (PRILOSEC) 20 MG delayed release capsule Take 20 mg by mouth 2 times daily    oxybutynin (DITROPAN) 5 MG tablet Take 10 mg by mouth daily    oxyCODONE (ROXICODONE) 5 MG immediate release tablet Take 10 mg by mouth every 6 hours as needed.  zolpidem (AMBIEN) 10 MG tablet Take 10 mg by mouth.  PARoxetine (PAXIL) 20 MG tablet Take 20 mg by mouth (Patient not taking: Reported on 6/7/2022)     No current facility-administered medications for this visit. Review of Systems   Constitutional: Positive for fatigue. Negative for chills, diaphoresis and fever. Respiratory: Positive for shortness of breath. Cardiovascular: Negative for chest pain, palpitations and leg swelling. Gastrointestinal: Negative for abdominal pain, constipation, diarrhea, nausea and vomiting. Neurological: Negative for dizziness, tremors, seizures, syncope, weakness and headaches. PHYSICAL EXAM:    Vitals:    22 1541   BP: 120/80   Pulse: 69   Resp: 20   Temp: 98 °F (36.7 °C)   TempSrc: Temporal   SpO2: (!) 86%  Comment: Room air   Weight: 226 lb (102.5 kg)   Height: 5' 5\" (1.651 m)      Body mass index is 37.61 kg/m². GENERAL APPEARANCE:  The patient is obese and in no respiratory distress. HEENT:  PERRL. Conjunctivae unremarkable. Nasal mucosa is without epistaxis, exudate, or polyps. Gums and dentition are unremarkable. There is mild oropharyngeal narrowing. TMs are clear. NECK/LYMPHATIC:  Symmetrical with no elevation of jugular venous pulsation. Trachea midline. No thyroid enlargement. No cervical adenopathy. LUNGS:  Normal respiratory effort with symmetrical lung expansion. Breath sounds with rhonchi bilaterally. HEART:  There is a regular rate and rhythm. No murmur, rub, or gallop. There is 1+ edema in the lower extremities. ABDOMEN:  Soft and non-tender. No hepatosplenomegaly. Bowel sounds are normal.    NEURO:  The patient is alert and oriented to person, place, and time. Memory appears intact and mood is normal.  No gross sensorimotor deficits are present. DIAGNOSTIC TESTS:   [facility]agin[facility]                Spirometry/Complete pulmonary function tests:    2019         Labs:     2022--             Exercise oximetry:  O2 sat room air at rest is 86%. O2 sat room air exertion (sitting to standing) is 85%. O2 sat 3 lpm is 92-94%. Larisa Jarquin NP, APRN - CNP  Electronically signed    Dictated using voice recognition software.   Proof read but unrecognized errors may exist.

## 2022-06-07 NOTE — TELEPHONE ENCOUNTER
Last seen: 5/11/21  Hx: MISSY , PAH, anxiety/depression, DM2    Pt reports not using CPAP but 5L O2; last visit plan to check VISHNU, ECHO & f/u in 2 months. Family calling to report increased sob & coughing; confirmed daughter Estefani Maher listed on EDIE; worsening symptoms over the last 2 weeks, PCP saw patient Thursday, had CXR done, prescribed inhalers & antibiotics; was advised to report to hospital for tx but patient refusing to go to hospital, wants to know if she can be seen sooner. Able to work in this afternoon. Reminded that our provider will help patient identify her goals of tx.

## 2022-06-07 NOTE — TELEPHONE ENCOUNTER
TRIAGE CALL      Complaint: SOB/Coughing  Cough: yes  Productive:  no  Bloody Sputum:  no  Increased SOB/Wheezing:  yes  Duration: 2 weeks  Fever/Chills: no  OTC Meds tried: none

## 2022-06-09 ENCOUNTER — TELEPHONE (OUTPATIENT)
Dept: SLEEP MEDICINE | Age: 77
End: 2022-06-09

## 2022-06-09 NOTE — TELEPHONE ENCOUNTER
Patient states no one has called to set up with O2. Please call pt with DME information. Please follow up with DME.

## 2022-06-09 NOTE — TELEPHONE ENCOUNTER
Tried calling [facility] and was not able to get a hold of anyone. It also stated that the mailbox was full so I was not able to leave a message. Will try calling them back later.  // Montez Skiff

## 2022-06-20 ENCOUNTER — HOSPITAL ENCOUNTER (OUTPATIENT)
Dept: GENERAL RADIOLOGY | Age: 77
Discharge: HOME | End: 2022-06-23
Payer: MEDICARE

## 2022-06-20 ENCOUNTER — HOSPITAL ENCOUNTER (OUTPATIENT)
Dept: LAB | Age: 77
Discharge: HOME | End: 2022-06-23
Payer: MEDICARE

## 2022-06-20 DIAGNOSIS — J81.0 ACUTE PULMONARY EDEMA (HCC): ICD-10-CM

## 2022-06-20 DIAGNOSIS — I50.9 CONGESTIVE HEART FAILURE, UNSPECIFIED HF CHRONICITY, UNSPECIFIED HEART FAILURE TYPE (HCC): ICD-10-CM

## 2022-06-20 PROCEDURE — 71046 X-RAY EXAM CHEST 2 VIEWS: CPT

## 2022-06-23 ENCOUNTER — OFFICE VISIT (OUTPATIENT)
Dept: PULMONOLOGY | Age: 77
End: 2022-06-23
Payer: MEDICARE

## 2022-06-23 VITALS
OXYGEN SATURATION: 91 % | DIASTOLIC BLOOD PRESSURE: 80 MMHG | TEMPERATURE: 98 F | HEART RATE: 71 BPM | RESPIRATION RATE: 20 BRPM | HEIGHT: 66 IN | BODY MASS INDEX: 36.16 KG/M2 | SYSTOLIC BLOOD PRESSURE: 120 MMHG | WEIGHT: 225 LBS

## 2022-06-23 DIAGNOSIS — R91.8 PULMONARY INFILTRATES: ICD-10-CM

## 2022-06-23 DIAGNOSIS — E66.01 SEVERE OBESITY (BMI 35.0-39.9) WITH COMORBIDITY (HCC): ICD-10-CM

## 2022-06-23 DIAGNOSIS — I27.20 PULMONARY HYPERTENSION (HCC): ICD-10-CM

## 2022-06-23 DIAGNOSIS — J81.0 ACUTE PULMONARY EDEMA (HCC): Primary | ICD-10-CM

## 2022-06-23 DIAGNOSIS — J96.11 CHRONIC RESPIRATORY FAILURE WITH HYPOXIA (HCC): ICD-10-CM

## 2022-06-23 PROCEDURE — 1123F ACP DISCUSS/DSCN MKR DOCD: CPT | Performed by: INTERNAL MEDICINE

## 2022-06-23 PROCEDURE — G8427 DOCREV CUR MEDS BY ELIG CLIN: HCPCS | Performed by: INTERNAL MEDICINE

## 2022-06-23 PROCEDURE — 1036F TOBACCO NON-USER: CPT | Performed by: INTERNAL MEDICINE

## 2022-06-23 PROCEDURE — G8417 CALC BMI ABV UP PARAM F/U: HCPCS | Performed by: INTERNAL MEDICINE

## 2022-06-23 PROCEDURE — 99214 OFFICE O/P EST MOD 30 MIN: CPT | Performed by: INTERNAL MEDICINE

## 2022-06-23 PROCEDURE — G8400 PT W/DXA NO RESULTS DOC: HCPCS | Performed by: INTERNAL MEDICINE

## 2022-06-23 PROCEDURE — 1090F PRES/ABSN URINE INCON ASSESS: CPT | Performed by: INTERNAL MEDICINE

## 2022-06-23 RX ORDER — FUROSEMIDE 40 MG/1
40 TABLET ORAL DAILY
Qty: 30 TABLET | Refills: 0 | Status: SHIPPED | OUTPATIENT
Start: 2022-06-23 | End: 2022-09-22

## 2022-06-23 RX ORDER — POTASSIUM CHLORIDE 750 MG/1
10 TABLET, EXTENDED RELEASE ORAL DAILY
Qty: 30 TABLET | Refills: 0 | Status: SHIPPED | OUTPATIENT
Start: 2022-06-23 | End: 2022-07-01

## 2022-06-23 ASSESSMENT — ENCOUNTER SYMPTOMS
COUGH: 0
DIARRHEA: 0
APNEA: 0
CONSTIPATION: 0
SHORTNESS OF BREATH: 1
NAUSEA: 0
WHEEZING: 0
CHEST TIGHTNESS: 0
ABDOMINAL PAIN: 0
VOMITING: 0

## 2022-06-23 NOTE — PROGRESS NOTES
Doc Alon Guerrero, bttn. [address], [address] [facility]  [phone]    Patient Name:  Elvi Adam  YOB: 1945    Office Visit 6/23/2022    CHIEF COMPLAINT:    Chief Complaint   Patient presents with    Other     Chronic Respiratory Failure, Acute pulmonary edema         HISTORY OF PRESENT ILLNESS:    I had the pleasure of seeing Ms. Cathy Francis in our clinic today. Ms. Yeny Liu is a 68 y.o. female who presents with a history of obesity, obstructive sleep apnea, nocturnal hypoxia, and pulmonary hypertension felt to be due to her chronic hypoxia. She is here for follow-up appointment. She was just seen in our office for urgent work in on 6/7 with acute respiratory failure associated with 2 weeks of increased chest congestion. Saw PCP and sat in low 80's on RA. Declined admission. Started on azithro and augmentin. Covid swab negative. CXR with B opacities. Was 86% in our office. She was started on 40mg lasix for suspected pulmonary edema. Repeat TTE was ordered. Started on 3L O2. She had a repeat CXR on 6/20 that was clear. She is here today for follow up appointment. She states she has been using O2 24/7 while at home. Never got portable O2. Interested in getting portable concentrator. States that she is feeling some better with the addition of lasix. Tells me she had blood drawn 6/20 but I cannot see these results. No LE edema. No wheeze.        Past Medical History:   Diagnosis Date    Anxiety and depression     since back problems--- well managed with medication    Arthritis     OA hands    Chronic pain     OA    Diabetes mellitus, type 2 ([facility] [address]     oral agents: checks BG 1X weekly avereage 100; A1C 6.5 (7/14/17); denies hypo    GERD (gastroesophageal reflux disease)     omeprazole- denies hiatal hernia- uses 2-3 extra pillows to prevent reflux- states daily reflux up throat     Hypertension     medication controlled    MISSY on CPAP     does not wear CPAP wears continuos oxygen    Oxygen dependent     PONV (postoperative nausea and vomiting)     states IV zofran relieves    Pulmonary nodule     stable, no longer monitored    Thyroid disease     hypo       Patient Active Problem List   Diagnosis    Severe obesity ([address]    Chronic pain syndrome    Neuropathy    Chest pain    Dyspnea    Abdominal pain    Restless leg syndrome    Leukocytosis    Diabetes mellitus ([facility] [address]    Rheumatic aortic stenosis    Pulmonary hypertension (HCC)    Urinary frequency    Anemia    Lumbosacral neuritis    Chronic respiratory failure with hypoxia (HCC)       Past Surgical History:   Procedure Laterality Date    CARPAL TUNNEL RELEASE Right 1988    COLONOSCOPY N/A 7/27/2017    COLONOSCOPY  BMI 41 performed by Marielle Berry MD at [facility] ENDOSCOPY    KNEE ARTHROSCOPY  2008    Left knee partial replacement    ORTHOPEDIC SURGERY Bilateral     carpal tunnel    SHOULDER ARTHROSCOPY Right 1989    Rotator cuff       [unfilled]    Social History     Socioeconomic History    Marital status:      Spouse name: Not on file    Number of children: Not on file    Years of education: Not on file    Highest education level: Not on file   Occupational History    Not on file   Tobacco Use    Smoking status: Never Smoker    Smokeless tobacco: Never Used   Substance and Sexual Activity    Alcohol use: No    Drug use: No    Sexual activity: Not on file   Other Topics Concern    Not on file   Social History Narrative     and lives with . Social Determinants of Health     Financial Resource Strain:     Difficulty of Paying Living Expenses: Not on file   Food Insecurity:     Worried About Running Out of Food in the Last Year: Not on file    Jaxon of Food in the Last Year: Not on file   Transportation Needs:     Lack of Transportation (Medical): Not on file    Lack of Transportation (Non-Medical):  Not on file   Physical Activity:     Days of Exercise per Week: Not on file    Minutes of Exercise per Session: Not on file   Stress:     Feeling of Stress : Not on file   Social Connections:     Frequency of Communication with Friends and Family: Not on file    Frequency of Social Gatherings with Friends and Family: Not on file    Attends Buddhist Services: Not on file    Active Member of Clubs or Organizations: Not on file    Attends Club or Organization Meetings: Not on file    Marital Status: Not on file   Intimate Partner Violence:     Fear of Current or Ex-Partner: Not on file    Emotionally Abused: Not on file    Physically Abused: Not on file    Sexually Abused: Not on file   Housing Stability:     Unable to Pay for Housing in the Last Year: Not on file    Number of [place] in the Last Year: Not on file    Unstable Housing in the Last Year: Not on file         Family History   Problem Relation Age of Onset    Cancer Mother         endometrial    Diabetes Brother     Heart Disease Brother     Heart Disease Brother     Diabetes Brother     Hypertension Brother     Hypertension Brother        Allergies   Allergen Reactions    Gabapentin Nausea And Vomiting and Other (See Comments)     Anxiety and GI upset  Anxiety and GI upset         Current Outpatient Medications   Medication Sig    furosemide (LASIX) 40 MG tablet Take 1 tablet by mouth daily    potassium chloride (KLOR-CON M) 10 MEQ extended release tablet Take 1 tablet by mouth daily    azithromycin (ZITHROMAX) 250 MG tablet Take 250 mg by mouth daily    amoxicillin-clavulanate (AUGMENTIN) 875-125 MG per tablet Take 1 tablet by mouth 2 times daily    albuterol sulfate HFA (PROVENTIL;VENTOLIN;PROAIR) 108 (90 Base) MCG/ACT inhaler Inhale 2 puffs into the lungs every 6 hours as needed for Wheezing    fluticasone (FLONASE) 50 MCG/ACT nasal spray 1 spray by Each Nostril route daily    fluticasone-vilanterol (BREO ELLIPTA) 100-25 MCG/INH AEPB inhaler Inhale 1 puff into the lungs daily    DULoxetine (CYMBALTA) 60 MG extended release capsule Take 60 mg by mouth daily    OXYGEN 4.5 L if O2 falls below 90    amLODIPine (NORVASC) 10 MG tablet Take 10 mg by mouth daily    vitamin D3 (CHOLECALCIFEROL) 125 MCG (5000 UT) TABS tablet Take by mouth daily    cyanocobalamin 2000 MCG tablet Take 2,000 mcg by mouth daily    levothyroxine (SYNTHROID) 50 MCG tablet Take 50 mcg by mouth every morning (before breakfast)    losartan (COZAAR) 100 MG tablet Take 100 mg by mouth    lovastatin (MEVACOR) 40 MG tablet Take 40 mg by mouth    metFORMIN (GLUCOPHAGE) 500 MG tablet Take 1,000 mg by mouth 2 times daily (with meals)    nadolol (CORGARD) 80 MG tablet Take 80 mg by mouth    omeprazole (PRILOSEC) 20 MG delayed release capsule Take 20 mg by mouth 2 times daily    oxybutynin (DITROPAN) 5 MG tablet Take 10 mg by mouth daily    oxyCODONE (ROXICODONE) 5 MG immediate release tablet Take 10 mg by mouth every 6 hours as needed.  PARoxetine (PAXIL) 20 MG tablet Take 20 mg by mouth     zolpidem (AMBIEN) 10 MG tablet Take 10 mg by mouth. No current facility-administered medications for this visit. REVIEW OF SYSTEMS:    Review of Systems   Constitutional: Positive for unexpected weight change. Negative for chills and fatigue. Respiratory: Positive for shortness of breath. Negative for apnea, cough, chest tightness and wheezing. Cardiovascular: Positive for leg swelling. Negative for chest pain and palpitations. Gastrointestinal: Negative for abdominal pain, constipation, diarrhea, nausea and vomiting. Neurological: Positive for headaches. Negative for dizziness, tremors, seizures and weakness. PHYSICAL EXAM:  Vitals:    06/23/22 1621   BP: 120/80   Pulse: 71   Resp: 20   Temp: 98 °F (36.7 °C)   SpO2: 91%       GENERAL APPEARANCE:  The patient is over weight and in no respiratory distress. HEENT:  PERRL.   Conjunctivae unremarkable. NECK/LYMPHATIC:  Symmetrical with no elevation of jugular venous pulsation. Trachea midline. No thyroid enlargement. No cervical adenopathy. LUNGS:  Normal respiratory effort with symmetrical lung expansion. Breath sounds reveal inspiratory crackles at the left base. HEART:  There is a regular rate and rhythm. No murmur, rub, or gallop. ABDOMEN:  Soft and non-tender. No hepatosplenomegaly. Bowel sounds are normal.    NEURO/PSYCH:  The patient is alert and oriented to person, place, and time. Memory appears intact and mood is normal.  No gross sensorimotor deficits are present. MS/SKIN:  There is no edema in the lower extremities. No rashes, bruises, lesions, ulcers visible. DIAGNOSTIC TESTS:  CXR:    XR CHEST (2 VW) 06/20/2022    Narrative  PA AND LATERAL CHEST X-RAY. Clinical Indication: Acute on chronic cough with chest congestion    Comparison: Chest x-ray dated 6/2/2022    Findings: 2 views of the chest submitted demonstrate the cardiac silhouette and  mediastinum to be unremarkable. There is no pleural effusion or pneumothorax. The lung parenchyma is clear. The included osseous structures are unremarkable. A thoracic spinal cord stimulator device is in place. Impression  No acute cardiopulmonary abnormality. CT of chest:     10/12/18   No evidence of pulmonary embolism.       Scattered scarring throughout the lungs without evidence of a focal pneumonia.       0.5 cm right lower lobe subpleural nodule, fortunately routine follow-up is   recommended.       Healed fractures of the lateral right seventh, eighth, and ninth ribs.                    Exercise oximetry:     5/8/2019   Resting room air sat 95%   Lowest ambulating room air sat 94%      11/27/18   Resting sat on 3 L: 86%   Resting sat on 4 L: 100%   Ambulate etc. 4 L: 100%      12/17/14   Resting RA 93%   Ambulating RA min 90%                 Spirometry:                 Date: 8/29/13 12/17/14 1/8/2019 FVC     2.58 - 92%   67%  2.47-92           FEV1     2.16 - 100%   75%  2.           FEV1/FVC     84%   85%  85           FEF 25-75%     2.73 - 135%   120%  2.           Bronchodilator Response          negative           TLC         4.35-89           RV         1.82-83           DLCO         11.5-52                 Methacholine Challenge - negative   TTE - EF 60-65%. Grade 2 diastolic. No evidence of pulmonary hypertension. Spirometry:   No flowsheet data found. .    ASSESSMENT:  (Medical Decision Making)   Diagnosis Orders   1. Acute pulmonary edema ([address]  CT CHEST WO CONTRAST    Ambulatory referral to Cardiology   2. Pulmonary hypertension ([facility] [address]  DME - DURABLE MEDICAL EQUIPMENT    CT CHEST WO CONTRAST    Ambulatory referral to Cardiology   3. Pulmonary infiltrates  CT CHEST WO CONTRAST    Ambulatory referral to Cardiology   4. Chronic respiratory failure with hypoxia (HCC)     5. Severe obesity (BMI 35.0-39. 9) with comorbidity [facility])           Patient with a history of morbid obesity and obstructive sleep apnea. Reports of pulmonary hypertension that this did not show up on her last echocardiogram.      Chronic hypoxic respiratory failure that is likely due to multiple factors. Suspected untreated obstructive sleep apnea though she did not have apneas on her recent sleep study. She did have significant ongoing hypoxemia. This is despite her using  4 L of oxygen at night. Now supposed to be on 5 L of oxygen at night. Now seen with new daytime hypoxia. Treated for possible pulmonary edema and clinically better. CXR still with some mild ILD like changes. PLAN:  -will check CT scan now to rule out new ILD changes. -will get cPFT's looking for reduction in lung function. -will get TTE to look for signs of worsening PH.   -continue lasix and Kcl for now.   -need to follow up lab results when available.  Need to know current K and creatinine levels.   -try to get portable concentrator for o2 outside the house.   -cont 5l o2 at night. F?u in 3 months. Portions of this note were created using voice recognition software. As such, error of speech recognition may have occurred. Orders Placed This Encounter   Procedures    CT CHEST WO CONTRAST     Standing Status:   Future     Standing Expiration Date:   12/23/2023     Order Specific Question:   Reason for exam:     Answer:   See Diagnosis    Ambulatory referral to Cardiology     Referral Priority:   Routine     Referral Type:   Consult for Advice and Opinion     Referral Reason:   Specialty Services Required     Requested Specialty:   Cardiology     Number of Visits Requested:   1    DME [address]     DME: [facility]    Please evaluate and provide patient for a Portable Concentrator.   Patient must maintain above 90%       Orders Placed This Encounter   Medications    furosemide (LASIX) 40 MG tablet     Sig: Take 1 tablet by mouth daily     Dispense:  30 tablet     Refill:  0    potassium chloride (KLOR-CON M) 10 MEQ extended release tablet     Sig: Take 1 tablet by mouth daily     Dispense:  30 tablet     Refill:  0         Portia Hauser MD  Electronically signed

## 2022-07-01 RX ORDER — POTASSIUM CHLORIDE 750 MG/1
TABLET, EXTENDED RELEASE ORAL
Qty: 30 TABLET | Refills: 2 | Status: SHIPPED | OUTPATIENT
Start: 2022-07-01 | End: 2022-07-27

## 2022-07-20 ENCOUNTER — OFFICE VISIT (OUTPATIENT)
Dept: CARDIOLOGY CLINIC | Age: 77
End: 2022-07-20
Payer: MEDICARE

## 2022-07-20 VITALS
DIASTOLIC BLOOD PRESSURE: 76 MMHG | WEIGHT: 226 LBS | HEIGHT: 66 IN | BODY MASS INDEX: 36.32 KG/M2 | HEART RATE: 72 BPM | SYSTOLIC BLOOD PRESSURE: 130 MMHG

## 2022-07-20 DIAGNOSIS — I50.32 CHRONIC HEART FAILURE WITH PRESERVED EJECTION FRACTION (HFPEF) (HCC): ICD-10-CM

## 2022-07-20 DIAGNOSIS — I06.0 RHEUMATIC AORTIC STENOSIS: Primary | ICD-10-CM

## 2022-07-20 DIAGNOSIS — I27.20 PULMONARY HYPERTENSION (HCC): ICD-10-CM

## 2022-07-20 PROCEDURE — 1090F PRES/ABSN URINE INCON ASSESS: CPT | Performed by: INTERNAL MEDICINE

## 2022-07-20 PROCEDURE — 1036F TOBACCO NON-USER: CPT | Performed by: INTERNAL MEDICINE

## 2022-07-20 PROCEDURE — 1123F ACP DISCUSS/DSCN MKR DOCD: CPT | Performed by: INTERNAL MEDICINE

## 2022-07-20 PROCEDURE — G8427 DOCREV CUR MEDS BY ELIG CLIN: HCPCS | Performed by: INTERNAL MEDICINE

## 2022-07-20 PROCEDURE — 93000 ELECTROCARDIOGRAM COMPLETE: CPT | Performed by: INTERNAL MEDICINE

## 2022-07-20 PROCEDURE — G8417 CALC BMI ABV UP PARAM F/U: HCPCS | Performed by: INTERNAL MEDICINE

## 2022-07-20 PROCEDURE — G8400 PT W/DXA NO RESULTS DOC: HCPCS | Performed by: INTERNAL MEDICINE

## 2022-07-20 PROCEDURE — 99214 OFFICE O/P EST MOD 30 MIN: CPT | Performed by: INTERNAL MEDICINE

## 2022-07-20 RX ORDER — OXYCODONE HYDROCHLORIDE 15 MG/1
TABLET ORAL
COMMUNITY
Start: 2022-07-13

## 2022-07-20 ASSESSMENT — ENCOUNTER SYMPTOMS: SHORTNESS OF BREATH: 1

## 2022-07-20 NOTE — PATIENT INSTRUCTIONS
Patient Education        Aortic Valve Stenosis: Care Instructions  Overview     Having aortic valve stenosis means that the valve between your heart and the large blood vessel that carries blood to the body (aorta) has narrowed. That forces the heart to pump harder to get enough blood through the valve. As stenosis gets worse, the valve gets narrower. This can cause symptoms. Symptomsinclude chest pain, dizziness, fainting, or shortness of breath. Your doctor will check your heart regularly. You may take medicine to lower blood pressure or cholesterol. If the stenosis gets worse, you may choose tohave the valve replaced. Follow-up care is a key part of your treatment and safety. Be sure to make and go to all appointments, and call your doctor if you are having problems. It's also a good idea to know your test results and keep alist of the medicines you take. How can you care for yourself at home? Be safe with medicines. Take your medicines exactly as prescribed. Call your doctor if you think you are having a problem with your medicine. Call your doctor if you have new symptoms or your symptoms get worse. Eat heart-healthy foods. These include vegetables, fruits, nuts, beans, lean meat, fish, and whole grains. Limit sodium, alcohol, and sugar. Be active. Ask your doctor what type and level of exercise is safe for you. Do not smoke. Stay at a healthy weight. Lose weight if you need to. Manage other health problems. If you think you may have a problem with alcohol or drug use, talk to your doctor. Avoid colds and flu. Get a pneumococcal vaccine shot if you need to. Get a flu vaccine every year. Take care of your teeth and gums. Get regular dental checkups. Good dental health is important because bacteria can spread from infected teeth and gums to the heart valves. When should you call for help? Call 911 anytime you think you may need emergency care.  For example, call if:    You passed out (lost consciousness). You have symptoms of a heart attack. These may include:  Chest pain or pressure, or a strange feeling in the chest.  Sweating. Shortness of breath. Nausea or vomiting. Pain, pressure, or a strange feeling in the back, neck, jaw, or upper belly or in one or both shoulders or arms. Lightheadedness or sudden weakness. A fast or irregular heartbeat. After you call 911, the  may tell you to chew 1 adult-strength or 2 to 4 low-doseaspirin. Wait for an ambulance. Do not try to drive yourself. Call your doctor now or seek immediate medical care if:    You have new symptoms or your symptoms get worse. You have new or increased shortness of breath. You are dizzy or lightheaded, or you feel like you may faint. You have sudden weight gain, such as more than 2 to 3 pounds in a day or 5 pounds in a week. (Your doctor may suggest a different range of weight gain.)     You have new or increased swelling in your legs, ankles, or feet. You are suddenly so tired or weak that you cannot do your usual activities. Watch closely for changes in your health, and be sure to contact your doctor ifyou have any problems. Where can you learn more? Go to https://[employer]. org and sign in to your [employer] account. Enter N516 in the [facility] box to learn more about \"Aortic Valve Stenosis: Care Instructions. \"     If you do not have an account, please click on the \"Sign Up Now\" link. Current as of: January 10, 2022               Content Version: 13.3  © 7590-8062 [employer]. Care instructions adapted under license by [facility] ([facility]). If you have questions about a medical condition or this instruction, always ask your healthcare professional. [address] any warranty or liability for your use of this information. Please visit www.[employer]iosmart. org for more information regarding cardiovascular disease prevention and

## 2022-07-20 NOTE — PROGRESS NOTES
[facility] CARDIOLOGY  [address] [facility], [address] [facility], [address] [place] [address]  PHONE: [phone]      22    NAME:  Carlos Vail  : 1945  MRN: [id number]         SUBJECTIVE:   Carlos Vail is a 68 y.o. female seen for a follow up visit regarding the following:     Chief Complaint   Patient presents with    Other     Rheumatic Aortic Stenosis            HPI:  Follow up  Other (Rheumatic Aortic Stenosis)   . Lost to follow up of valvular AS since . Complicated by depression and chronic pain, chronic respiratory failure on supplemental oxygen and followed by pulmonary, sleep apnea. Returns having been to pulmonary with worsening respiratory symptoms last month, declined hospital admission, treated with antibiotics and found to have pulmonary edema responsive to diuretics. She has felt better with lasix and denies cp, palpitations or peripheral edema, near syncope         Past cardiac history:   WHO group 3 PHTN d/t MISSY and obesity, on oxygen therapy   - normal coronary arteries at cath    - normal coronary arteries by CTA. Calcium score = 1. Oct 2018 - borderline LVH with EF 60-65%, moderate AS with mean gradient 21, peak 39, peak LVOT 3.1 m/s, normal diastolic parameters and no other significant abnormality   Dec 2019- normal LV size, EF 65-70%, impaired relaxation, mild to mod AS, mean gradient 18, 3 m/s, SVI 42, GUSTABO 1.2. Mild RVE measuring 3.8 with normal function and no TR  LOST TO FOLLOW UP          Past Medical History, Past Surgical History, Family history, Social History, and Medications were all reviewed with the patient today and updated as necessary. Prior to Admission medications    Medication Sig Start Date End Date Taking?  Authorizing Provider   KLOR-CON M1Damon 10 MEQ extended release tablet TAKE 1 TABLET BY MOUTH EVERY DAY 22  Yes Sukumar Lamb MD   furosemide (LASIX) 40 MG tablet Take 1 tablet by mouth daily 22  Yes Sukumar Lamb MD albuterol sulfate HFA (PROVENTIL;VENTOLIN;PROAIR) 108 (90 Base) MCG/ACT inhaler Inhale 2 puffs into the lungs every 6 hours as needed for Wheezing   Yes Historical Provider, MD   fluticasone (FLONASE) 50 MCG/ACT nasal spray 1 spray by Each Nostril route as needed   Yes Historical Provider, MD   fluticasone-vilanterol (BREO ELLIPTA) 100-25 MCG/INH AEPB inhaler Inhale 1 puff into the lungs daily   Yes Historical Provider, MD   DULoxetine (CYMBALTA) 60 MG extended release capsule Take 60 mg by mouth daily   Yes Historical Provider, MD   OXYGEN 4.5 L if O2 falls below 90   Yes Ar Automatic Reconciliation   amLODIPine (NORVASC) 10 MG tablet Take 10 mg by mouth daily   Yes Ar Automatic Reconciliation   vitamin D3 (CHOLECALCIFEROL) 125 MCG (5000 UT) TABS tablet Take by mouth daily   Yes Ar Automatic Reconciliation   cyanocobalamin 2000 MCG tablet Take 2,000 mcg by mouth daily   Yes Ar Automatic Reconciliation   levothyroxine (SYNTHROID) 50 MCG tablet Take 50 mcg by mouth every morning (before breakfast)   Yes Ar Automatic Reconciliation   losartan (COZAAR) 100 MG tablet Take 100 mg by mouth   Yes Ar Automatic Reconciliation   lovastatin (MEVACOR) 40 MG tablet Take 40 mg by mouth   Yes Ar Automatic Reconciliation   metFORMIN (GLUCOPHAGE) 500 MG tablet Take 1,000 mg by mouth 2 times daily (with meals)   Yes Ar Automatic Reconciliation   nadolol (CORGARD) 80 MG tablet Take 80 mg by mouth   Yes Ar Automatic Reconciliation   omeprazole (PRILOSEC) 20 MG delayed release capsule Take 20 mg by mouth 2 times daily   Yes Ar Automatic Reconciliation   oxybutynin (DITROPAN) 5 MG tablet Take 10 mg by mouth daily   Yes Ar Automatic Reconciliation   oxyCODONE (ROXICODONE) 5 MG immediate release tablet Take 10 mg by mouth every 6 hours as needed. 11/1/18  Yes Ar Automatic Reconciliation   zolpidem (AMBIEN) 10 MG tablet Take 10 mg by mouth.    Yes Ar Automatic Reconciliation   oxyCODONE (OXY-IR) 15 MG immediate release tablet TAKE 1 TABLET BY MOUTH EVERY 6 HOURS AS NEEDED FOR PAIN 7/13/22   Historical Provider, MD     Allergies   Allergen Reactions    Gabapentin Nausea And Vomiting and Other (See Comments)     Anxiety and GI upset  Anxiety and GI upset       Past Medical History:   Diagnosis Date    Anxiety and depression     since back problems--- well managed with medication    Arthritis     OA hands    Chronic pain     OA    Diabetes mellitus, type 2 (HCC)     oral agents: checks BG 1X weekly avereage 100; A1C 6.5 (7/14/17); denies hypo    GERD (gastroesophageal reflux disease)     omeprazole- denies hiatal hernia- uses 2-3 extra pillows to prevent reflux- states daily reflux up throat     Hypertension     medication controlled    MISSY on CPAP     does not wear CPAP wears continuos oxygen    Oxygen dependent     PONV (postoperative nausea and vomiting)     states IV zofran relieves    Pulmonary nodule     stable, no longer monitored    Thyroid disease     hypo     Past Surgical History:   Procedure Laterality Date    CARPAL TUNNEL RELEASE Right 1988    COLONOSCOPY N/A 7/27/2017    COLONOSCOPY  BMI 41 performed by Jill Paige MD at [facility] ENDOSCOPY    KNEE ARTHROSCOPY  2008    Left knee partial replacement    ORTHOPEDIC SURGERY Bilateral     carpal tunnel    SHOULDER ARTHROSCOPY Right 1989    Rotator cuff     Family History   Problem Relation Age of Onset    Cancer Mother         endometrial    Diabetes Brother     Heart Disease Brother     Heart Disease Brother     Diabetes Brother     Hypertension Brother     Hypertension Brother      Social History     Tobacco Use    Smoking status: Never    Smokeless tobacco: Never   Substance Use Topics    Alcohol use: No       ROS:    Review of Systems   Cardiovascular:  Negative for chest pain and near-syncope. Respiratory:  Positive for shortness of breath.          PHYSICAL EXAM:   /76   Pulse 72   Ht 5' 6\" (1.676 m)   Wt 226 lb (102.5 kg)   BMI 36.48 kg/m²      Wt Readings from Last 3 Encounters:   07/20/22 226 lb (102.5 kg)   06/23/22 225 lb (102.1 kg)   06/07/22 226 lb (102.5 kg)     BP Readings from Last 3 Encounters:   07/20/22 130/76   06/23/22 120/80   06/07/22 120/80     Pulse Readings from Last 3 Encounters:   07/20/22 72   06/23/22 71   06/07/22 69           Physical Exam  Constitutional:       Appearance: Normal appearance. HENT:      Head: Normocephalic and atraumatic. Eyes:      Conjunctiva/sclera: Conjunctivae normal.   Neck:      Vascular: No carotid bruit. Cardiovascular:      Rate and Rhythm: Normal rate and regular rhythm. Heart sounds: Murmur heard. Harsh mid to late systolic murmur is present with a grade of 3/6 at the upper right sternal border radiating to the neck. No friction rub. No gallop. Pulmonary:      Effort: No respiratory distress. Breath sounds: No wheezing or rales. Musculoskeletal:         General: No swelling. Cervical back: Neck supple. Skin:     General: Skin is warm and dry. Neurological:      General: No focal deficit present. Mental Status: She is alert. Psychiatric:         Mood and Affect: Mood normal.         Behavior: Behavior normal.       Medical problems and test results were reviewed with the patient today.      DATA REVIEW       6/2/22 1339     Glucose 70 - 99 mg/dL 154 High     BUN 8.0 - 23.0 mg/dL 9.0    Creatinine 0.50 - 0.90 mg/dL 0.56    BUN/Creat Ratio  16    Sodium 135 - 146 mmol/L 139    K (Potassium) 3.5 - 5.2 mmol/L 4.4    Chloride 98 - 107 mmol/L 100    CO2 22 - 29 mmol/L 25    Anion Gap 4 - 15 mmol/L 14    Calcium 8.6 - 10.0 mg/dL 9.2    Total Protein 6.4 - 8.3 g/dL 7.0    Albumin 3.9 - 4.9 g/dL 3.7 Low     Globulin  3.3    Albumin/Globulin Ratio  1.1    Bili T 0.2 - 1.2 mg/dL 0.3    Alkaline Phosphatase 35 - 104 U/L 91    AST 5 - 32 U/L 14    ALT 5 - 33 U/L 7    eGFR >=60 mL/min/1.73m*2 >60      6/2/22 1339     Cholesterol <200 mg/dL 147    Triglycerides <=150 mg/dL 130    HDL >65 mg/dL 44 Low LDL 0 - 99 mg/dL 77    VLDL 0 - 30 mg/dL 26    RISK RATIO 0.00 - 4.97 3.34      6/2/22 1339     TSH 0.300 - 4.000 uIU/mL 0.508      6/2/22 1339     WBC 3.50 - 10.80 K/microL 12.55 High     RBC 3.60 - 5.00 M/microL 4.89    HGB 11.5 - 15.3 g/dL 11.7    HCT 33.5 - 46.0 % 38.1    MCV 80.0 - 100.0 fL 77.9 Low     MCH 25.9 - 34.9 PG 23.9 Low     MCHC 32.0 - 36.0 g/dL 30.7 Low     RDW 11.0 - 15.0 % 18.2 High      - 450 K/microL 492 High           EKG    sinus rhythm 72  normal axis  RBBB with secondary T wave abnormality    CXR/IMAGING        DEVICE INTERROGATION        OUTSIDE RECORDS REVIEW    Records from outside providers have been reviewed and summarized as noted in the HPI, past history and data review sections of this note, and reviewed with patient. .       ASSESSMENT and Fan Nico was seen today for other. Diagnoses and all orders for this visit:    Rheumatic aortic stenosis  -     Transthoracic echocardiogram (TTE) complete with contrast, bubble, strain, and 3D PRN; Future  -     EKG 12 Lead    Pulmonary hypertension (HCC)    Chronic heart failure with preserved ejection fraction (HFpEF) (HCC)        IMPRESSION:    Suspect worsening AS, need new echo, discussed pathophysiology and progression. Ultimately will probably need TAVR, would be a poor SAVR candidate d/t comorbid conditions. Hgb is normal but microcytic indices, at risk for AVMS with AS--will get iron studies next visit. Return for AFTER PROCEDURE TO REVIEW RESULTS. Thank you for allowing me to participate in this patient's care. Please call or contact me if there are any questions or concerns regarding the above.       Cristian Heredia MD  07/20/22  12:11 PM

## 2022-07-22 ENCOUNTER — HOSPITAL ENCOUNTER (OUTPATIENT)
Dept: CT IMAGING | Age: 77
Discharge: HOME | End: 2022-07-25
Payer: MEDICARE

## 2022-07-22 DIAGNOSIS — I27.20 PULMONARY HYPERTENSION (HCC): ICD-10-CM

## 2022-07-22 DIAGNOSIS — J81.0 ACUTE PULMONARY EDEMA (HCC): ICD-10-CM

## 2022-07-22 DIAGNOSIS — R91.8 PULMONARY INFILTRATES: ICD-10-CM

## 2022-07-22 PROCEDURE — 71250 CT THORAX DX C-: CPT

## 2022-07-27 RX ORDER — POTASSIUM CHLORIDE 750 MG/1
TABLET, EXTENDED RELEASE ORAL
Qty: 30 TABLET | Refills: 2 | Status: SHIPPED | OUTPATIENT
Start: 2022-07-27 | End: 2022-10-07

## 2022-07-27 NOTE — TELEPHONE ENCOUNTER
Dr. Melva Chavez, patient has previously seen Dr. Elana Barnett on 06/23/22 and in his note it states that the patient should continue the potassium for now. Patient does have an upcoming appointment with Lord Velez in September. Can you please sign this off since Dr. Elana Barnett is on vacation? I pended the prescription with 30 tabs and 2 refills. Please sign if appropriate. Thank you so much!

## 2022-07-28 ENCOUNTER — NURSE ONLY (OUTPATIENT)
Dept: PULMONOLOGY | Age: 77
End: 2022-07-28
Payer: MEDICARE

## 2022-07-28 ENCOUNTER — TELEPHONE (OUTPATIENT)
Dept: PULMONOLOGY | Age: 77
End: 2022-07-28

## 2022-07-28 DIAGNOSIS — I27.20 PULMONARY HTN (HCC): Primary | ICD-10-CM

## 2022-07-28 LAB
FEF25-27, POC: 3.12 L/S
FET, POC: NORMAL
FEV 1 , POC: 2.21 L
FEV1/FVC, POC: NORMAL
FVC, POC: NORMAL
LUNG AGE, POC: NORMAL
PEF, POC: 6.37 L/S

## 2022-07-28 PROCEDURE — 94010 BREATHING CAPACITY TEST: CPT | Performed by: INTERNAL MEDICINE

## 2022-07-28 PROCEDURE — 94726 PLETHYSMOGRAPHY LUNG VOLUMES: CPT | Performed by: INTERNAL MEDICINE

## 2022-07-28 PROCEDURE — 94729 DIFFUSING CAPACITY: CPT | Performed by: INTERNAL MEDICINE

## 2022-07-28 NOTE — TELEPHONE ENCOUNTER
Spoke with the patient's spouse Audrey Montes) in regards to their CT scan results, explained per Dr. Bairon Omer that the CT scan was normal and that we will contact her further after her breathing and echocardiogram tests. Yossi Hernandez understood the results and will notify the patient. No further questions or concerns were asked at this time. // Chiquita ANDREA

## 2022-07-28 NOTE — TELEPHONE ENCOUNTER
----- Message from Sukumar Lamb MD sent at 7/28/2022  9:43 AM EDT -----  Can you let the patient know that her CT scan was normal. We will contact her further after her pulmonary function tests and echocardiogram.   Sukumar Lamb MD

## 2022-08-26 PROBLEM — I06.0 RHEUMATIC AORTIC STENOSIS: Status: ACTIVE | Noted: 2018-10-12

## 2022-09-09 ENCOUNTER — HOSPITAL ENCOUNTER (EMERGENCY)
Age: 77
Discharge: HOME | End: 2022-09-09
Attending: EMERGENCY MEDICINE
Payer: MEDICARE

## 2022-09-09 ENCOUNTER — HOSPITAL ENCOUNTER (EMERGENCY)
Dept: CT IMAGING | Age: 77
Discharge: HOME | End: 2022-09-12
Payer: MEDICARE

## 2022-09-09 VITALS
OXYGEN SATURATION: 98 % | WEIGHT: 240 LBS | HEIGHT: 65 IN | SYSTOLIC BLOOD PRESSURE: 140 MMHG | BODY MASS INDEX: 39.99 KG/M2 | RESPIRATION RATE: 18 BRPM | DIASTOLIC BLOOD PRESSURE: 68 MMHG | TEMPERATURE: 98.3 F | HEART RATE: 65 BPM

## 2022-09-09 DIAGNOSIS — S32.050D CLOSED COMPRESSION FRACTURE OF L5 LUMBAR VERTEBRA WITH ROUTINE HEALING, SUBSEQUENT ENCOUNTER: ICD-10-CM
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
## 2022-09-09 LAB
ALBUMIN SERPL-MCNC: 3.5 G/DL (ref 3.2–4.6)
ALBUMIN/GLOB SERPL: 0.7 {RATIO} (ref 1.2–3.5)
ALP SERPL-CCNC: 79 U/L (ref 50–136)
ALT SERPL-CCNC: 13 U/L (ref 12–65)
ANION GAP SERPL CALC-SCNC: 9 MMOL/L (ref 4–13)
APPEARANCE UR: CLEAR
AST SERPL-CCNC: 9 U/L (ref 15–37)
BACTERIA URNS QL MICRO: ABNORMAL /HPF
BASOPHILS # BLD: 0.1 K/UL (ref 0–0.2)
BASOPHILS NFR BLD: 1 % (ref 0–2)
BILIRUB SERPL-MCNC: 0.3 MG/DL (ref 0.2–1.1)
BILIRUB UR QL: NEGATIVE
BUN SERPL-MCNC: 17 MG/DL (ref 8–23)
CALCIUM SERPL-MCNC: 9.6 MG/DL (ref 8.3–10.4)
CASTS URNS QL MICRO: ABNORMAL /LPF
CHLORIDE SERPL-SCNC: 99 MMOL/L (ref 101–110)
CO2 SERPL-SCNC: 27 MMOL/L (ref 21–32)
COLOR UR: ABNORMAL
CREAT SERPL-MCNC: 0.9 MG/DL (ref 0.6–1)
CRP SERPL-MCNC: 2.3 MG/DL (ref 0–0.9)
DIFFERENTIAL METHOD BLD: ABNORMAL
EOSINOPHIL # BLD: 0.5 K/UL (ref 0–0.8)
EOSINOPHIL NFR BLD: 4 % (ref 0.5–7.8)
EPI CELLS #/AREA URNS HPF: ABNORMAL /HPF
ERYTHROCYTE [DISTWIDTH] IN BLOOD BY AUTOMATED COUNT: 16.9 % (ref 11.9–14.6)
GLOBULIN SER CALC-MCNC: 4.9 G/DL (ref 2.3–3.5)
GLUCOSE SERPL-MCNC: 133 MG/DL (ref 65–100)
GLUCOSE UR STRIP.AUTO-MCNC: NEGATIVE MG/DL
HCT VFR BLD AUTO: 40.4 % (ref 35.8–46.3)
HGB BLD-MCNC: 12.3 G/DL (ref 11.7–15.4)
HGB UR QL STRIP: NEGATIVE
IMM GRANULOCYTES # BLD AUTO: 0.1 K/UL (ref 0–0.5)
IMM GRANULOCYTES NFR BLD AUTO: 1 % (ref 0–5)
KETONES UR QL STRIP.AUTO: ABNORMAL MG/DL
LEUKOCYTE ESTERASE UR QL STRIP.AUTO: ABNORMAL
LYMPHOCYTES # BLD: 2.4 K/UL (ref 0.5–4.6)
LYMPHOCYTES NFR BLD: 15 % (ref 13–44)
MCH RBC QN AUTO: 24.8 PG (ref 26.1–32.9)
MCHC RBC AUTO-ENTMCNC: 30.4 G/DL (ref 31.4–35)
MCV RBC AUTO: 81.5 FL (ref 79.6–97.8)
MONOCYTES # BLD: 1.4 K/UL (ref 0.1–1.3)
MONOCYTES NFR BLD: 9 % (ref 4–12)
MUCOUS THREADS URNS QL MICRO: ABNORMAL /LPF
NEUTS SEG # BLD: 11 K/UL (ref 1.7–8.2)
NEUTS SEG NFR BLD: 71 % (ref 43–78)
NITRITE UR QL STRIP.AUTO: NEGATIVE
NRBC # BLD: 0 K/UL (ref 0–0.2)
OTHER OBSERVATIONS: ABNORMAL
PH UR STRIP: 5 [PH] (ref 5–9)
PLATELET # BLD AUTO: 428 K/UL (ref 150–450)
PMV BLD AUTO: 9.4 FL (ref 9.4–12.3)
POTASSIUM SERPL-SCNC: 3.6 MMOL/L (ref 3.5–5.1)
PROCALCITONIN SERPL-MCNC: <0.05 NG/ML (ref 0–0.49)
PROT SERPL-MCNC: 8.4 G/DL (ref 6.3–8.2)
PROT UR STRIP-MCNC: ABNORMAL MG/DL
RBC # BLD AUTO: 4.96 M/UL (ref 4.05–5.2)
SODIUM SERPL-SCNC: 135 MMOL/L (ref 136–145)
SP GR UR REFRACTOMETRY: 1.03 (ref 1–1.02)
UROBILINOGEN UR QL STRIP.AUTO: 1 EU/DL (ref 0.2–1)
WBC # BLD AUTO: 15.6 K/UL (ref 4.3–11.1)
WBC URNS QL MICRO: ABNORMAL /HPF

## 2022-09-09 PROCEDURE — 86140 C-REACTIVE PROTEIN: CPT

## 2022-09-09 PROCEDURE — 2580000003 HC RX 258: Performed by: EMERGENCY MEDICINE

## 2022-09-09 PROCEDURE — 72132 CT LUMBAR SPINE W/DYE: CPT

## 2022-09-09 PROCEDURE — 96374 THER/PROPH/DIAG INJ IV PUSH: CPT

## 2022-09-09 PROCEDURE — 99285 EMERGENCY DEPT VISIT HI MDM: CPT

## 2022-09-09 PROCEDURE — 87086 URINE CULTURE/COLONY COUNT: CPT

## 2022-09-09 PROCEDURE — 81001 URINALYSIS AUTO W/SCOPE: CPT

## 2022-09-09 PROCEDURE — 80053 COMPREHEN METABOLIC PANEL: CPT

## 2022-09-09 PROCEDURE — 96375 TX/PRO/DX INJ NEW DRUG ADDON: CPT

## 2022-09-09 PROCEDURE — 84145 PROCALCITONIN (PCT): CPT

## 2022-09-09 PROCEDURE — 6360000002 HC RX W HCPCS: Performed by: EMERGENCY MEDICINE

## 2022-09-09 PROCEDURE — 85025 COMPLETE CBC W/AUTO DIFF WBC: CPT

## 2022-09-09 PROCEDURE — 6360000004 HC RX CONTRAST MEDICATION: Performed by: EMERGENCY MEDICINE

## 2022-09-09 RX ORDER — MORPHINE SULFATE 4 MG/ML
4 INJECTION, SOLUTION INTRAMUSCULAR; INTRAVENOUS
Status: COMPLETED | OUTPATIENT
Start: 2022-09-09 | End: 2022-09-09

## 2022-09-09 RX ORDER — 0.9 % SODIUM CHLORIDE 0.9 %
100 INTRAVENOUS SOLUTION INTRAVENOUS ONCE
Status: COMPLETED | OUTPATIENT
Start: 2022-09-09 | End: 2022-09-09

## 2022-09-09 RX ORDER — SODIUM CHLORIDE 0.9 % (FLUSH) 0.9 %
10 SYRINGE (ML) INJECTION
Status: COMPLETED | OUTPATIENT
Start: 2022-09-09 | End: 2022-09-09

## 2022-09-09 RX ORDER — CEPHALEXIN 500 MG/1
500 CAPSULE ORAL 2 TIMES DAILY
Qty: 28 CAPSULE | Refills: 0 | Status: SHIPPED | OUTPATIENT
Start: 2022-09-09 | End: 2022-09-19

## 2022-09-09 RX ORDER — ONDANSETRON 2 MG/ML
4 INJECTION INTRAMUSCULAR; INTRAVENOUS
Status: COMPLETED | OUTPATIENT
Start: 2022-09-09 | End: 2022-09-09

## 2022-09-09 RX ADMIN — ONDANSETRON 4 MG: 2 INJECTION INTRAMUSCULAR; INTRAVENOUS at 19:41

## 2022-09-09 RX ADMIN — MORPHINE SULFATE 4 MG: 4 INJECTION INTRAVENOUS at 19:41

## 2022-09-09 RX ADMIN — IOPAMIDOL 100 ML: 755 INJECTION, SOLUTION INTRAVENOUS at 17:16

## 2022-09-09 RX ADMIN — SODIUM CHLORIDE, PRESERVATIVE FREE 10 ML: 5 INJECTION INTRAVENOUS at 17:16

## 2022-09-09 RX ADMIN — SODIUM CHLORIDE 100 ML: 9 INJECTION, SOLUTION INTRAVENOUS at 17:16

## 2022-09-09 ASSESSMENT — ENCOUNTER SYMPTOMS
VOMITING: 0
SHORTNESS OF BREATH: 0
DIARRHEA: 0
RHINORRHEA: 0
ABDOMINAL PAIN: 0
NAUSEA: 0
COUGH: 0
BACK PAIN: 1

## 2022-09-09 ASSESSMENT — PAIN SCALES - GENERAL
PAINLEVEL_OUTOF10: 10
PAINLEVEL_OUTOF10: 10

## 2022-09-09 ASSESSMENT — PAIN DESCRIPTION - LOCATION: LOCATION: BACK

## 2022-09-09 ASSESSMENT — PAIN - FUNCTIONAL ASSESSMENT: PAIN_FUNCTIONAL_ASSESSMENT: 0-10

## 2022-09-09 ASSESSMENT — PAIN DESCRIPTION - ORIENTATION: ORIENTATION: LOWER

## 2022-09-09 ASSESSMENT — PAIN DESCRIPTION - FREQUENCY: FREQUENCY: CONTINUOUS

## 2022-09-09 NOTE — ED PROVIDER NOTES
Emergency Department Provider Note                   PCP:                Pamela Dunn MD               Age: 68 y.o. Sex: female       ICD-10-CM    1. Closed compression fracture of L5 lumbar vertebra, [facility]  S32.050A           DISPOSITION          MDM  Number of Diagnoses or Management Options  Closed compression fracture of L5 lumbar vertebra with routine healing, subsequent encounter: new, needed workup  Fall, initial encounter: new, needed workup  Diagnosis management comments: Patient was seen by pain management specialist to rule out cauda equina syndrome. Patient was spinal stimulator. Unable to get MRI. Discussed with radiologist.  Recommends patient have CT L-spine with IV contrast only at this time. Blood cell count noted to be slightly to 15.6. Patient with history of elevated white blood cell count in the past at baseline. CT L-spine with multilevel spondylosis and facet arthrosis. Age-indeterminate L5 compression deformity new since July 2017 CT, favored to be chronic. No definite acute process evident by CT. UA with micro pending. No concern for epidural abscess as symptoms all occurred after fall with worsening pain. Morphine, Zofran given for pain. CRP, procalcitonin added on. Will handoff for oncoming physician to follow-up on UA, inflammatory markers.        Amount and/or Complexity of Data Reviewed  Clinical lab tests: ordered and reviewed  Tests in the radiology section of CPT®: ordered and reviewed  Tests in the medicine section of CPT®: ordered and reviewed  Review and summarize past medical records: yes  Discuss the patient with other providers: yes  Independent visualization of images, tracings, or specimens: yes    Risk of Complications, Morbidity, and/or Mortality  Presenting problems: moderate  Diagnostic procedures: moderate  Management options: moderate    Patient Progress  Patient progress: stable       Orders Placed This Encounter Neurological:  Negative for dizziness, facial asymmetry, weakness, light-headedness, numbness and headaches. Hematological:  Does not bruise/bleed easily. Psychiatric/Behavioral:  Negative for confusion. Past Medical History:   Diagnosis Date    Anxiety and depression     since back problems--- well managed with medication    Arthritis     OA hands    Chronic pain     OA    [facility]     oral agents: checks BG 1X weekly avereage 100; A1C 6.5 (7/14/17); denies hypo    GERD (gastroesophageal reflux disease)     omeprazole- denies hiatal hernia- uses 2-3 extra pillows to prevent reflux- states daily reflux up throat     Hypertension     medication controlled    MISSY on CPAP     does not wear CPAP wears continuos oxygen    Oxygen dependent     PONV (postoperative nausea and vomiting)     states IV zofran relieves    Pulmonary nodule     stable, no longer monitored    Thyroid disease     hypo        Past Surgical History:   Procedure Laterality Date    CARPAL TUNNEL RELEASE Right 1988    COLONOSCOPY N/A 7/27/2017    COLONOSCOPY  BMI 41 performed by Tina Mcmillan MD at [address] [place] [address] ARTHROSCOPY  2008    Left knee partial replacement    ORTHOPEDIC SURGERY Bilateral     carpal tunnel    SHOULDER ARTHROSCOPY Right 1989    Rotator cuff        Family History   Problem Relation Age of Onset    Cancer Mother         endometrial    Diabetes Brother     Heart Disease Brother     Heart Disease Brother     Diabetes Brother     Hypertension Brother     Hypertension Brother         Social History     Socioeconomic History    Marital status:    Tobacco Use    Smoking status: Never    Smokeless tobacco: Never   Substance and Sexual Activity    Alcohol use: No    Drug use: No   Social History Narrative     and lives with .          Gabapentin     Previous Medications    ALBUTEROL SULFATE HFA (PROVENTIL;VENTOLIN;PROAIR) 108 (90 BASE) MCG/ACT INHALER    Inhale 2 puffs into the lungs every 6 hours as needed for Wheezing    AMLODIPINE (NORVASC) 10 MG TABLET    Take 10 mg by mouth daily    CYANOCOBALAMIN 2000 MCG TABLET    Take 2,000 mcg by mouth daily    DULOXETINE (CYMBALTA) 60 MG EXTENDED RELEASE CAPSULE    Take 60 mg by mouth daily    FLUTICASONE (FLONASE) 50 MCG/ACT NASAL SPRAY    1 spray by Each Nostril route as needed    FLUTICASONE-VILANTEROL (BREO ELLIPTA) 100-25 MCG/INH AEPB INHALER    Inhale 1 puff into the lungs daily    FUROSEMIDE (LASIX) 40 MG TABLET    Take 1 tablet by mouth daily    LEVOTHYROXINE (SYNTHROID) 50 MCG TABLET    Take 50 mcg by mouth every morning (before breakfast)    LOSARTAN (COZAAR) 100 MG TABLET    Take 100 mg by mouth    LOVASTATIN (MEVACOR) 40 MG TABLET    Take 40 mg by mouth    METFORMIN (GLUCOPHAGE) 500 MG TABLET    Take 1,000 mg by mouth 2 times daily (with meals)    NADOLOL (CORGARD) 80 MG TABLET    Take 80 mg by mouth    OMEPRAZOLE (PRILOSEC) 20 MG DELAYED RELEASE CAPSULE    Take 20 mg by mouth 2 times daily    OXYBUTYNIN (DITROPAN) 5 MG TABLET    Take 10 mg by mouth daily    OXYCODONE (OXY-IR) 15 MG IMMEDIATE RELEASE TABLET    TAKE 1 TABLET BY MOUTH EVERY 6 HOURS AS NEEDED FOR PAIN    OXYCODONE (ROXICODONE) 5 MG IMMEDIATE RELEASE TABLET    Take 10 mg by mouth every 6 hours as needed. OXYGEN    4.5 L if O2 falls below 90    POTASSIUM CHLORIDE (KLOR-CON M10) 10 MEQ EXTENDED RELEASE TABLET    TAKE 1 TABLET BY MOUTH EVERY DAY    VITAMIN D3 (CHOLECALCIFEROL) 125 MCG (5000 UT) TABS TABLET    Take by mouth daily    ZOLPIDEM (AMBIEN) 10 MG TABLET    Take 10 mg by mouth. Vitals signs and nursing note reviewed. No data found. Physical Exam  Vitals and nursing note reviewed. Constitutional:       Appearance: Normal appearance. HENT:      Head: Normocephalic and atraumatic. Comments: Atraumatic. No evidence of basilar skull fracture.      Nose: Nose normal.      Mouth/Throat:      Mouth: Mucous membranes are moist.   Eyes:      Extraocular Movements: Extraocular movements intact. Pupils: Pupils are equal, round, and reactive to light. Neck:      Comments: Full range of motion. No midline C-spine tenderness. No step-off. Cardiovascular:      Rate and Rhythm: Normal rate. Pulses: Normal pulses. Heart sounds: Normal heart sounds. Pulmonary:      Effort: Pulmonary effort is normal.      Breath sounds: Normal breath sounds. Abdominal:      General: Bowel sounds are normal.      Palpations: Abdomen is soft. Tenderness: There is no abdominal tenderness. There is no guarding or rebound. Comments: Soft, nontender, nondistended. No rebound or guarding. No CVA tenderness. Musculoskeletal:         General: No swelling, tenderness or deformity. Normal range of motion. Cervical back: Normal range of motion. No rigidity. Comments: No midline T-spine or L-spine tenderness palpation. No step-off. Moderate bilateral lumbar paraspinal muscle tenderness palpation. Full range of motion of bilateral lower extremities. Skin:     General: Skin is warm. Findings: No erythema or rash. Neurological:      General: No focal deficit present. Mental Status: She is alert and oriented to person, place, and time. Cranial Nerves: No cranial nerve deficit. Motor: No weakness. Comments: Strength 5 out of 5 throughout. Normal sensory exam.  No saddle anesthesia. Normal DTRs.         Procedures      Results Include:    Recent Results (from the past 24 hour(s))   CMP    Collection Time: 09/09/22  3:40 PM   Result Value Ref Range    Sodium 135 (L) 136 - 145 mmol/L    Potassium 3.6 3.5 - 5.1 mmol/L    Chloride 99 (L) 101 - 110 mmol/L    CO2 27 21 - 32 mmol/L    Anion Gap 9 4 - 13 mmol/L    Glucose 133 (H) 65 - 100 mg/dL    BUN 17 8 - 23 MG/DL    Creatinine 0.90 0.6 - 1.0 MG/DL    GFR African American >60 >60 ml/min/1.73m2    GFR Non- >60 >60 ml/min/1.73m2    Calcium 9.6 8.3 - 10.4 MG/DL    Total Bilirubin 0.3 0.2 - 1.1 MG/DL    ALT 13 12 - 65 U/L    AST 9 (L) 15 - 37 U/L    Alk Phosphatase 79 50 - 136 U/L    Total Protein 8.4 (H) 6.3 - 8.2 g/dL    Albumin 3.5 3.2 - 4.6 g/dL    Globulin 4.9 (H) 2.3 - 3.5 g/dL    Albumin/Globulin Ratio 0.7 (L) 1.2 - 3.5     CBC with Auto Differential    Collection Time: 09/09/22  3:40 PM   Result Value Ref Range    WBC 15.6 (H) 4.3 - 11.1 K/uL    RBC 4.96 4.05 - 5.2 M/uL    Hemoglobin 12.3 11.7 - 15.4 g/dL    Hematocrit 40.4 35.8 - 46.3 %    MCV 81.5 79.6 - 97.8 FL    MCH 24.8 (L) 26.1 - 32.9 PG    MCHC 30.4 (L) 31.4 - 35.0 g/dL    RDW 16.9 (H) 11.9 - 14.6 %    Platelets 213 243 - 701 K/uL    MPV 9.4 9.4 - 12.3 FL    nRBC 0.00 0.0 - 0.2 K/uL    Differential Type AUTOMATED      Seg Neutrophils 71 43 - 78 %    Lymphocytes 15 13 - 44 %    Monocytes 9 4.0 - 12.0 %    Eosinophils % 4 0.5 - 7.8 %    Basophils 1 0.0 - 2.0 %    Immature Granulocytes 1 0.0 - 5.0 %    Segs Absolute 11.0 (H) 1.7 - 8.2 K/UL    Absolute Lymph # 2.4 0.5 - 4.6 K/UL    Absolute Mono # 1.4 (H) 0.1 - 1.3 K/UL    Absolute Eos # 0.5 0.0 - 0.8 K/UL    Basophils Absolute 0.1 0.0 - 0.2 K/UL    Absolute Immature Granulocyte 0.1 0.0 - 0.5 K/UL          CT LUMBAR SPINE W CONTRAST   Final Result   1. Multilevel spondylosis and facet arthrosis. 2.  Age-indeterminate L5 compression deformity new since the 7/2017 CT, favored   to be chronic. 3.  No definite acute process evident by CT. ED Course as of 09/09/22 1844   Fri Sep 09, 2022   1737 WBC(!): 15.6 [DF]   1827 CT L spine IMPRESSION:  1.  Multilevel spondylosis and facet arthrosis. 2.  Age-indeterminate L5 compression deformity new since the 7/2017 CT, favored  to be chronic. 3.  No definite acute process evident by CT. [DF]      ED Course User Index  [DF] Mele Lew MD        Voice dictation software was used during the making of this note.   This software is not perfect and grammatical and other typographical errors may be present. This note has not been completely proofread for errors.      Iggy Pierce., MD  09/09/22 1831       Jesús Negron MD  09/09/22 1667

## 2022-09-09 NOTE — ED TRIAGE NOTES
Reports having lower back pain after she fell several days ago and reports having new onset urinary incontinence. States she has hx of chronic back pain with stimulator in place. Has taken Oxycodone without relief. Was seen at pain management yesterday.

## 2022-09-10 NOTE — ED NOTES
I have reviewed discharge instructions with the patient. The patient verbalized understanding. Patient left ED via Discharge Method: wheelchair to Home with selfOpportunity for questions and clarification provided. Patient given 1 scripts. Medication explained to pt and pt v\u about medication, pt in no acute distress at discharge. To continue your aftercare when you leave the hospital, you may receive an automated call from our care team to check in on how you are doing. This is a free service and part of our promise to provide the best care and service to meet your aftercare needs.  If you have questions, or wish to unsubscribe from this service please call [phone]. Thank you for Choosing our [address] [place] [address] Emergency Department.        Dayna Purvis RN  09/09/22 7514

## 2022-09-12 LAB
BACTERIA SPEC CULT: NORMAL
BACTERIA SPEC CULT: NORMAL
SERVICE CMNT-IMP: NORMAL

## 2022-09-21 NOTE — TELEPHONE ENCOUNTER
Patient Refill Request     Last Office Visit: 06/23/22     When they were supposed to follow up: 3 months      Upcoming Office Visit: 09/26/22 with Renella Fix FNP     Refills Requested: 30 day     Type of refill: Lasix 40 mg     Requested Pharmacy: [address])     Is prescription pended? Yes    Dr. Norma Vanessa, I pended the lasix with 30 tablets and 0 refills. I wasn't sure if you wanted to continue this medication.   Please edit if not appropriate. // Amado Plaster

## 2022-09-22 RX ORDER — FUROSEMIDE 40 MG/1
TABLET ORAL
Qty: 30 TABLET | Refills: 0 | Status: SHIPPED | OUTPATIENT
Start: 2022-09-22 | End: 2022-10-07 | Stop reason: CLARIF

## 2022-09-23 DIAGNOSIS — I27.20 PULMONARY HYPERTENSION (HCC): Primary | ICD-10-CM

## 2022-09-23 NOTE — PROGRESS NOTES
In previous encounter on 6/23/22 Dr. Bairon Omer wanted to get labs drawn per note. BMP was placed per Dr. Bairon Omer.  Called pt to confirm that she will get them done prior to her appt with Leilani Johnson on 09/26. // Stepan Mcdowell MA

## 2022-09-26 ENCOUNTER — OFFICE VISIT (OUTPATIENT)
Dept: PULMONOLOGY | Age: 77
End: 2022-09-26
Payer: MEDICARE

## 2022-09-26 VITALS
HEIGHT: 65 IN | SYSTOLIC BLOOD PRESSURE: 108 MMHG | OXYGEN SATURATION: 95 % | BODY MASS INDEX: 39.99 KG/M2 | HEART RATE: 65 BPM | TEMPERATURE: 97.5 F | WEIGHT: 240 LBS | DIASTOLIC BLOOD PRESSURE: 60 MMHG

## 2022-09-26 DIAGNOSIS — I27.20 PULMONARY HYPERTENSION (HCC): Primary | ICD-10-CM

## 2022-09-26 DIAGNOSIS — I50.9 CONGESTIVE HEART FAILURE, UNSPECIFIED HF CHRONICITY, UNSPECIFIED HEART FAILURE TYPE (HCC): ICD-10-CM

## 2022-09-26 DIAGNOSIS — N39.0 URINARY TRACT INFECTION WITHOUT HEMATURIA, SITE UNSPECIFIED: ICD-10-CM

## 2022-09-26 DIAGNOSIS — R09.02 HYPOXEMIA: ICD-10-CM

## 2022-09-26 DIAGNOSIS — J96.11 CHRONIC RESPIRATORY FAILURE WITH HYPOXIA (HCC): ICD-10-CM

## 2022-09-26 DIAGNOSIS — I27.20 PULMONARY HYPERTENSION (HCC): ICD-10-CM

## 2022-09-26 PROCEDURE — G8417 CALC BMI ABV UP PARAM F/U: HCPCS | Performed by: NURSE PRACTITIONER

## 2022-09-26 PROCEDURE — G8427 DOCREV CUR MEDS BY ELIG CLIN: HCPCS | Performed by: NURSE PRACTITIONER

## 2022-09-26 PROCEDURE — 99213 OFFICE O/P EST LOW 20 MIN: CPT | Performed by: NURSE PRACTITIONER

## 2022-09-26 PROCEDURE — G8400 PT W/DXA NO RESULTS DOC: HCPCS | Performed by: NURSE PRACTITIONER

## 2022-09-26 PROCEDURE — 1090F PRES/ABSN URINE INCON ASSESS: CPT | Performed by: NURSE PRACTITIONER

## 2022-09-26 PROCEDURE — 1036F TOBACCO NON-USER: CPT | Performed by: NURSE PRACTITIONER

## 2022-09-26 PROCEDURE — 1123F ACP DISCUSS/DSCN MKR DOCD: CPT | Performed by: NURSE PRACTITIONER

## 2022-09-26 RX ORDER — CEPHALEXIN 500 MG/1
500 CAPSULE ORAL 2 TIMES DAILY
Qty: 20 CAPSULE | Refills: 0 | Status: SHIPPED | OUTPATIENT
Start: 2022-09-26 | End: 2022-10-06

## 2022-09-26 NOTE — PROGRESS NOTES
Patient Name:  Elle Childers                             YOB: 1945  MRN: [id number]                                              Office Visit 9/26/2022    ASSESSMENT AND PLAN:  (Medical Decision Making)    Hortencia Saleem was seen today for shortness of breath. Diagnoses and all orders for this visit:    Pulmonary hypertension ([facility] [address]  --resolved with diuresis. Chronic respiratory failure with hypoxia (HCC)  --improved. No longer requires O2. Hypoxemia  --resolved. Congestive heart failure, unspecified HF chronicity, unspecified heart failure type ([address]  --BMP today. Will adjust Lasix and KCL based upon results. Urinary tract infection without hematuria, site unspecified  --keflex 500 mg bid x 10 days. Other orders  -     cephALEXin (KEFLEX) 500 MG capsule; Take 1 capsule by mouth 2 times daily for 10 days    Orders Placed This Encounter   Medications    cephALEXin (KEFLEX) 500 MG capsule     Sig: Take 1 capsule by mouth 2 times daily for 10 days     Dispense:  20 capsule     Refill:  0       No orders of the defined types were placed in this encounter. Follow-up and Dispositions    Return in about 6 months (around 3/26/2023) for Ronald Metcalf or Smith//respiratory failure. Jodie Coats NP, APRN - CNP    _________________________________________________________________________    HISTORY OF PRESENT ILLNESS:    Ms. Luis Olson is a 68 y.o. female who is seen at [facility] today for  Shortness of Breath    The patient is a 80-year-old female who is seen for follow-up of shortness of breath. She has a history of obesity, obstructive sleep apnea, nocturnal hypoxemia, and pulmonary hypertension felt to be secondary to chronic hypoxemia. She is accompanied by her . She is examined in a wheelchair--very limited mobility due to chronic back pain. From a pulmonary standpoint, she feels well. No cough or wheezing. Has not required her O2.     A follow-up echo on 8/25/2022 which revealed an EF of 60 to 65% and no evidence of pulmonary hypertension. Had follow up CPFTs which revealed normal spirometry and no restriction. IC:ERV ratio was elevated consistent with obesity. DLCO was decreased at 58%. Does complain of UTI symptoms and is requesting antibiotics. REVIEW OF SYSTEMS: 10 point review of systems is negative except as reported in HPI. PHYSICAL EXAM: Body mass index is 39.94 kg/m². Vitals:    09/26/22 1310   BP: 108/60   Pulse: 65   Temp: 97.5 °F (36.4 °C)   SpO2: 95%   Weight: 240 lb (108.9 kg)   Height: 5' 5\" (1.651 m)         General:   Alert, cooperative, no distress, appears stated age. Eyes:   Conjunctivae/corneas clear. PERRL        Mouth/Throat:  Lips, mucosa, and tongue normal. Teeth and gums normal.        Lungs:     Breath sounds clear bilaterally. Heart:   Regular rate and rhythm, S1, S2 normal, no murmur, click, rub or gallop. Abdomen:    Soft, non-tender. Extremities:  Extremities normal, atraumatic, no cyanosis or edema. Skin:  Skin color normal. No rashes or lesions     Neurologic:  A&Ox3     DIAGNOSTIC TESTS:                                                                                    LABS:   Lab Results   Component Value Date/Time    WBC 15.6 09/09/2022 03:40 PM    HGB 12.3 09/09/2022 03:40 PM    HCT 40.4 09/09/2022 03:40 PM     09/09/2022 03:40 PM    CRP 2.3 09/09/2022 03:40 PM     Imaging: I performed an independent interpretation of the patient's images. CXR:   XR CHEST STANDARD TWO VW 06/20/2022    Narrative  PA AND LATERAL CHEST X-RAY. Clinical Indication: Acute on chronic cough with chest congestion    Comparison: Chest x-ray dated 6/2/2022    Findings: 2 views of the chest submitted demonstrate the cardiac silhouette and  mediastinum to be unremarkable. There is no pleural effusion or pneumothorax. The lung parenchyma is clear. The included osseous structures are unremarkable.   A thoracic spinal cord stimulator device is in place. Impression  No acute cardiopulmonary abnormality. CT Chest:   CT LUMBAR SPINE W CONTRAST 09/09/2022    Narrative  CT lumbar spine with IV contrast    INDICATION: Lower back pain with urinary incontinence    COMPARISON: MRI lumbar spine 11/18/2016, CT abdomen and pelvis 7/13/2017. Heidi Patricia TECHNIQUE: Axial CT images were obtained through the lumbar spine following  administration of 100 mL Isovue-370 IV contrast. Coronal and sagittal reformats  are provided. FINDINGS:  The bones are demineralized. Age indeterminate compression fracture at the L5  superior endplate with resultant 25-50% vertebral body height loss. Prominent L5  anterior osteophyte is fractured but this also appears to be old. No significant  retropulsion of fracture fragments. There is grade 1 L3 on L4 anterolisthesis. Severe degeneration at L3-4, L4-5, and L5-S1 with disc space narrowing, endplate  sclerosis, and endplate osteophyte development. There is also mild to moderate  multilevel facet arthrosis. No definite epidural fluid collection. Arteriosclerotic calcifications. L1-2: Shallow disc osteophyte complex without significant narrowing of the  spinal canal or neural foramina. L2-3: Broad-based disc bulge causing at least mild narrowing of the central  spinal canal and bilateral neural foramina. L3-4: Anterolisthesis with disc pseudobulge and osteophyte development causing  mild narrowing of the central spinal canal and mild to moderate narrowing of  bilateral neural foramina. L4-5: Disc bulge and prominent posterior osteophytes as well as mild facet  arthrosis causing at least mild narrowing of the central spinal canal, moderate  left, and mild to moderate right neuroforaminal narrowing. L5-S1 disc osteophyte complex and facet arthrosis causing mild narrowing of the  central spinal canal and bilateral neuroforamina. Impression  1. Multilevel spondylosis and facet arthrosis.   2. Age-indeterminate L5 compression deformity new since the 7/2017 CT, favored  to be chronic. 3.  No definite acute process evident by CT. Nuclear Medicine: No results found for this or any previous visit from the past 3650 days. PFTs:         Echo:   TRANSTHORACIC ECHOCARDIOGRAM (TTE) COMPLETE (CONTRAST/BUBBLE/3D PRN) 08/25/2022    Interpretation Summary    Left Ventricle: Normal left ventricular systolic function with a visually estimated EF of 60 - 65%. Left ventricle size is normal. Mildly increased wall thickness. Normal wall motion. Abnormal diastolic function. Aortic Valve: Mildly calcified cusp. Moderate stenosis of the aortic valve, mean gradient 21mmHg. Mitral Valve: Mild regurgitation. Left Atrium: Left atrium is moderately dilated. LA Vol Index A/L is 33 mL/m2.     [facility] Reference Info:                                                                                                                  Past Medical History:   Diagnosis Date    Anxiety and depression     since back problems--- well managed with medication    Arthritis     OA hands    Chronic pain     OA    Diabetes mellitus, type 2 ([facility] [address]     oral agents: checks BG 1X weekly avereage 100; A1C 6.5 (7/14/17); denies hypo    GERD (gastroesophageal reflux disease)     omeprazole- denies hiatal hernia- uses 2-3 extra pillows to prevent reflux- states daily reflux up throat     Hypertension     medication controlled    MISSY on CPAP     does not wear CPAP wears continuos oxygen    Oxygen dependent     PONV (postoperative nausea and vomiting)     states IV zofran relieves    Pulmonary nodule     stable, no longer monitored    Thyroid disease     hypo        Tobacco Use      Smoking status: Never      Smokeless tobacco: Never    Allergies   Allergen Reactions    Gabapentin Nausea And Vomiting and Other (See Comments)     Anxiety and GI upset  Anxiety and GI upset       Current Outpatient Medications   Medication Instructions    amLODIPine (NORVASC) 10 mg, Oral, DAILY    cephALEXin (KEFLEX) 500 mg, Oral, 2 TIMES DAILY    cyanocobalamin 2,000 mcg, Oral, DAILY    DULoxetine (CYMBALTA) 60 mg, DAILY    fluticasone (FLONASE) 50 MCG/ACT nasal spray 1 spray, AS NEEDED    furosemide (LASIX) 40 MG tablet TAKE 1 TABLET BY MOUTH EVERY DAY    levothyroxine (SYNTHROID) 50 mcg, Oral, DAILY BEFORE BREAKFAST    losartan (COZAAR) 100 mg, Oral    lovastatin (MEVACOR) 40 mg, Oral    metFORMIN (GLUCOPHAGE) 1,000 mg, Oral, 2 TIMES DAILY WITH MEALS    nadolol (CORGARD) 80 mg, Oral    omeprazole (PRILOSEC) 20 mg, Oral, 2 TIMES DAILY    oxybutynin (DITROPAN) 10 mg, Oral, DAILY    oxyCODONE (OXY-IR) 15 MG immediate release tablet TAKE 1 TABLET BY MOUTH EVERY 6 HOURS AS NEEDED FOR PAIN    oxyCODONE (ROXICODONE) 10 mg, EVERY 6 HOURS PRN    OXYGEN 2-4 lpm prn    potassium chloride (KLOR-CON M10) 10 MEQ extended release tablet TAKE 1 TABLET BY MOUTH EVERY DAY    vitamin D3 (CHOLECALCIFEROL) 125 MCG (5000 UT) TABS tablet Oral, DAILY    zolpidem (AMBIEN) 10 mg, Oral

## 2022-09-27 LAB
ANION GAP SERPL CALC-SCNC: 6 MMOL/L (ref 4–13)
BUN SERPL-MCNC: 19 MG/DL (ref 8–23)
CALCIUM SERPL-MCNC: 9.6 MG/DL (ref 8.3–10.4)
CHLORIDE SERPL-SCNC: 109 MMOL/L (ref 101–110)
CO2 SERPL-SCNC: 19 MMOL/L (ref 21–32)
CREAT SERPL-MCNC: 1.1 MG/DL (ref 0.6–1)
GLUCOSE SERPL-MCNC: 130 MG/DL (ref 65–100)
POTASSIUM SERPL-SCNC: 5.3 MMOL/L (ref 3.5–5.1)
SODIUM SERPL-SCNC: 134 MMOL/L (ref 136–145)

## 2022-09-28 ENCOUNTER — TELEPHONE (OUTPATIENT)
Dept: PULMONOLOGY | Age: 77
End: 2022-09-28

## 2022-09-28 NOTE — TELEPHONE ENCOUNTER
Denies known Latex allergy or symptoms of Latex sensitivity.  Health Maintenance Due   Topic Date Due   • Influenza Vaccine (1) 09/01/2020   • Annual Physical (ages 3-18)  11/25/2020       Patient is due for topics as listed above but is not proceeding with Immunization(s) Influenza at this time.     Medications verified    Brook Gann is a 18 year old female presenting for a cpe.  Refill needed and set up.   Spoke with the patient in regards to their Lab results, explained per Geoff HOWELL that the kidney function is ok, but the potassium level is slightly elevated. I told the patient that Dulce Maria Woods recommends to stop taking the potassium tablets and to decrease the Lasix to 1/2 tab daily. Patient understood the results and did not have any further questions or concerns at this time. // Sally RASMUSSEN.

## 2022-10-07 ENCOUNTER — OFFICE VISIT (OUTPATIENT)
Dept: CARDIOLOGY CLINIC | Age: 77
End: 2022-10-07
Payer: MEDICARE

## 2022-10-07 VITALS
BODY MASS INDEX: 39.99 KG/M2 | DIASTOLIC BLOOD PRESSURE: 50 MMHG | WEIGHT: 240 LBS | HEART RATE: 56 BPM | SYSTOLIC BLOOD PRESSURE: 110 MMHG | HEIGHT: 65 IN

## 2022-10-07 DIAGNOSIS — R06.02 SHORTNESS OF BREATH: Primary | ICD-10-CM

## 2022-10-07 DIAGNOSIS — I50.32 CHRONIC HEART FAILURE WITH PRESERVED EJECTION FRACTION (HFPEF) (HCC): ICD-10-CM

## 2022-10-07 DIAGNOSIS — I06.0 RHEUMATIC AORTIC STENOSIS: ICD-10-CM

## 2022-10-07 PROCEDURE — G8400 PT W/DXA NO RESULTS DOC: HCPCS | Performed by: INTERNAL MEDICINE

## 2022-10-07 PROCEDURE — G8427 DOCREV CUR MEDS BY ELIG CLIN: HCPCS | Performed by: INTERNAL MEDICINE

## 2022-10-07 PROCEDURE — 1090F PRES/ABSN URINE INCON ASSESS: CPT | Performed by: INTERNAL MEDICINE

## 2022-10-07 PROCEDURE — 1123F ACP DISCUSS/DSCN MKR DOCD: CPT | Performed by: INTERNAL MEDICINE

## 2022-10-07 PROCEDURE — G8484 FLU IMMUNIZE NO ADMIN: HCPCS | Performed by: INTERNAL MEDICINE

## 2022-10-07 PROCEDURE — 99214 OFFICE O/P EST MOD 30 MIN: CPT | Performed by: INTERNAL MEDICINE

## 2022-10-07 PROCEDURE — G8417 CALC BMI ABV UP PARAM F/U: HCPCS | Performed by: INTERNAL MEDICINE

## 2022-10-07 PROCEDURE — 1036F TOBACCO NON-USER: CPT | Performed by: INTERNAL MEDICINE

## 2022-10-07 RX ORDER — FUROSEMIDE 40 MG/1
40 TABLET ORAL DAILY
Qty: 90 TABLET | Refills: 3
Start: 2022-10-07 | End: 2022-11-02 | Stop reason: SDUPTHER

## 2022-10-07 ASSESSMENT — ENCOUNTER SYMPTOMS: SHORTNESS OF BREATH: 1

## 2022-10-07 NOTE — PROGRESS NOTES
[facility] CARDIOLOGY  [address], [address]  PHONE: [phone]      10/07/22    NAME:  Clifton Suazo  : 1945  MRN: [id number]         SUBJECTIVE:   Clifton Suazo is a 68 y.o. female seen for a follow up visit regarding the following:     Chief Complaint   Patient presents with    Results     echo    Cardiac Valve Problem     Aortic Stenosis             HPI:  Follow up  Results (echo) and Cardiac Valve Problem (Aortic Stenosis )   . Follow up AS and HFpEF. AS remains moderate. She has diastolic dysfunction and chronic multifactorial dyspnea    Unfortunately her med list is in high question. It lists that she is not taking lasix at all, she says she ran out and thought she wasn't supposed to resume, saw pulmonary on  whom she says resumed it but their notes indicate they later contacted her to stop the potassium and note says to reduce lasix to 1/2 dose, but patient unaware of that and unsure what her original dose was. She had a prescription at the pharmacy that her daughter picked up but the patient has not been taking it and doesn't know what the dose was. She is uncertain of her other doses either. That said, she reports her dyspnea has been better of late, no peripheral edema. BP at home generally 120/70. Past cardiac history:   WHO group 3 PHTN d/t MISSY and obesity, on oxygen therapy   - normal coronary arteries at cath    - normal coronary arteries by CTA. Calcium score = 1. Oct 2018 - borderline LVH with EF 60-65%, moderate AS with mean gradient 21, peak 39, peak LVOT 3.1 m/s, normal diastolic parameters and no other significant abnormality   Dec 2019- normal LV size, EF 65-70%, impaired relaxation, mild to mod AS, mean gradient 18, 3 m/s, SVI 42, GUSTABO 1.2.   Mild RVE measuring 3.8 with normal function and no TR  LOST TO FOLLOW UP  Sep 2022        Echo - EF 55-60%, abn DF, mod AS, peak velocity 3.2 m/s          Key CAD CHF Meds            furosemide (LASIX) 40 MG tablet (Taking)    Sig - Route: Take 1 tablet by mouth daily - Oral    Class: NO PRINT    amLODIPine (NORVASC) 10 MG tablet (Taking)    Class: Historical Med    losartan (COZAAR) 100 MG tablet (Taking)    Class: Historical Med    lovastatin (MEVACOR) 40 MG tablet (Taking)    Class: Historical Med    nadolol (CORGARD) 80 MG tablet (Taking)    Class: Historical Med              Past Medical History, Past Surgical History, Family history, Social History, and Medications were all reviewed with the patient today and updated as necessary. Prior to Admission medications    Medication Sig Start Date End Date Taking?  Authorizing Provider   furosemide (LASIX) 40 MG tablet Take 1 tablet by mouth daily 10/7/22  Yes Bienvenido Murrieta MD   oxyCODONE (OXY-IR) 15 MG immediate release tablet TAKE 1 TABLET BY MOUTH EVERY 6 HOURS AS NEEDED FOR PAIN 7/13/22  Yes Historical Provider, MD   OXYGEN 2-4 lpm prn   Yes Ar Automatic Reconciliation   amLODIPine (NORVASC) 10 MG tablet Take 10 mg by mouth daily   Yes Ar Automatic Reconciliation   vitamin D3 (CHOLECALCIFEROL) 125 MCG (5000 UT) TABS tablet Take by mouth daily   Yes Ar Automatic Reconciliation   cyanocobalamin 2000 MCG tablet Take 2,000 mcg by mouth daily   Yes Ar Automatic Reconciliation   levothyroxine (SYNTHROID) 50 MCG tablet Take 50 mcg by mouth every morning (before breakfast)   Yes Ar Automatic Reconciliation   losartan (COZAAR) 100 MG tablet Take 100 mg by mouth   Yes Ar Automatic Reconciliation   lovastatin (MEVACOR) 40 MG tablet Take 40 mg by mouth   Yes Ar Automatic Reconciliation   metFORMIN (GLUCOPHAGE) 500 MG tablet Take 1,000 mg by mouth 2 times daily (with meals)   Yes Ar Automatic Reconciliation   nadolol (CORGARD) 80 MG tablet Take 80 mg by mouth   Yes Ar Automatic Reconciliation   omeprazole (PRILOSEC) 20 MG delayed release capsule Take 20 mg by mouth 2 times daily   Yes Ar Automatic Reconciliation   oxybutynin (DITROPAN) 5 MG tablet Take 10 mg by mouth daily   Yes Ar Automatic Reconciliation   zolpidem (AMBIEN) 10 MG tablet Take 10 mg by mouth. Yes Ar Automatic Reconciliation     Allergies   Allergen Reactions    Gabapentin Nausea And Vomiting and Other (See Comments)     Anxiety and GI upset  Anxiety and GI upset       Past Medical History:   Diagnosis Date    Anxiety and depression     since back problems--- well managed with medication    Arthritis     OA hands    Chronic pain     OA    Diabetes mellitus, type 2 (HCC)     oral agents: checks BG 1X weekly avereage 100; A1C 6.5 (7/14/17); denies hypo    GERD (gastroesophageal reflux disease)     omeprazole- denies hiatal hernia- uses 2-3 extra pillows to prevent reflux- states daily reflux up throat     Hypertension     medication controlled    MISSY on CPAP     does not wear CPAP wears continuos oxygen    Oxygen dependent     PONV (postoperative nausea and vomiting)     states IV zofran relieves    Pulmonary nodule     stable, no longer monitored    Thyroid disease     hypo     Past Surgical History:   Procedure Laterality Date    CARPAL TUNNEL RELEASE Right 1988    COLONOSCOPY N/A 7/27/2017    COLONOSCOPY  BMI 41 performed by Kathryn Lincoln MD at [facility] ENDOSCOPY    KNEE ARTHROSCOPY  2008    Left knee partial replacement    ORTHOPEDIC SURGERY Bilateral     carpal tunnel    SHOULDER ARTHROSCOPY Right 1989    Rotator cuff     Family History   Problem Relation Age of Onset    Cancer Mother         endometrial    Diabetes Brother     Heart Disease Brother     Heart Disease Brother     Diabetes Brother     Hypertension Brother     Hypertension Brother      Social History     Tobacco Use    Smoking status: Never    Smokeless tobacco: Never   Substance Use Topics    Alcohol use: No       ROS:    Review of Systems   Cardiovascular:  Negative for chest pain and leg swelling. Respiratory:  Positive for shortness of breath (better).          PHYSICAL EXAM:   BP (!) 110/50 Pulse 56   Ht 5' 5\" (1.651 m)   Wt 240 lb (108.9 kg)   BMI 39.94 kg/m²      Wt Readings from Last 3 Encounters:   10/07/22 240 lb (108.9 kg)   09/26/22 240 lb (108.9 kg)   09/09/22 240 lb (108.9 kg)     BP Readings from Last 3 Encounters:   10/07/22 (!) 110/50   09/26/22 108/60   09/09/22 (!) 140/68     Pulse Readings from Last 3 Encounters:   10/07/22 56   09/26/22 65   09/09/22 65           Physical Exam  Constitutional:       Appearance: Normal appearance. HENT:      Head: Normocephalic and atraumatic. Eyes:      Conjunctiva/sclera: Conjunctivae normal.   Neck:      Vascular: No carotid bruit. Cardiovascular:      Rate and Rhythm: Normal rate and regular rhythm. Heart sounds: No murmur heard. No friction rub. No gallop. Pulmonary:      Effort: No respiratory distress. Breath sounds: No wheezing or rales. Musculoskeletal:         General: No swelling. Cervical back: Neck supple. Skin:     General: Skin is warm and dry. Neurological:      General: No focal deficit present. Mental Status: She is alert. Psychiatric:         Mood and Affect: Mood normal.         Behavior: Behavior normal.       Medical problems and test results were reviewed with the patient today. DATA REVIEW    BMP  Lab Results   Component Value Date/Time     09/26/2022 12:40 PM    K 5.3 09/26/2022 12:40 PM     09/26/2022 12:40 PM    CO2 19 09/26/2022 12:40 PM    BUN 19 09/26/2022 12:40 PM    CREATININE 1.10 09/26/2022 12:40 PM    GLUCOSE 130 09/26/2022 12:40 PM    CALCIUM 9.6 09/26/2022 12:40 PM          EKG        CXR/IMAGING        DEVICE INTERROGATION        OUTSIDE RECORDS REVIEW    Records from outside providers have been reviewed and summarized as noted in the HPI, past history and data review sections of this note, and reviewed with patient. .       ASSESSMENT and PLAN    Royal Nguyen was seen today for results and cardiac valve problem.     Diagnoses and all orders for this visit:    Shortness of breath  -     Basic Metabolic Panel; Future  -     Ferritin; Future  -     Iron; Future    Chronic heart failure with preserved ejection fraction (HFpEF) (HCC)    Rheumatic aortic stenosis    Other orders  -     furosemide (LASIX) 40 MG tablet; Take 1 tablet by mouth daily        IMPRESSION:    Stable valve disease, continue regular echo surveillance    Dyspnea has improved of late which is good news, but explained with hfpef need to be on some daily dose of loop diuretic. There is significant confusion, however, regarding what she has at home and what she may have been taking. I have asked her to take her AVS today which will reflect what we believe her to be taking, as well as a 40 mg daily lasix dose without potassium for now until we have follow up labs in 2 weeks    Have asked her to check BP at home, notify me right away if staying < 110/60. Reviewed daily weights protocol and gave written instructions to bring bp and weight diaries and med bottles to follow up visit. Return in about 6 weeks (around 11/18/2022) for bring med bottles, home weight and home BP diary please. Thank you for allowing me to participate in this patient's care. Please call or contact me if there are any questions or concerns regarding the above.       Cyn Kerr MD  10/07/22  1:58 PM

## 2022-10-07 NOTE — PATIENT INSTRUCTIONS
Patient Education        Heart Failure: Care Instructions  Your Care Instructions     Heart failure occurs when your heart does not pump as much blood as the body needs. Failure does not mean that the heart has stopped pumping but rather that it is not pumping as well as it should. Over time, this causes fluid buildup in your lungs and other parts of your body. Fluid buildup can cause shortness of breath, fatigue, swollen ankles, and other problems. By taking medicines regularly, reducing sodium (salt) in your diet, checking your weight every day, and making lifestyle changes, you can feel better and live longer. Follow-up care is a key part of your treatment and safety. Be sure to make and go to all appointments, and call your doctor if you are having problems. It's also a good idea to know your test results and keep a list of the medicines you take. How can you care for yourself at home? Medicines    Be safe with medicines. Take your medicines exactly as prescribed. Call your doctor if you think you are having a problem with your medicine. Do not take any vitamins, over-the-counter medicine, or herbal products without talking to your doctor first. Hyla Dense not take ibuprofen (Advil or Motrin) and naproxen (Aleve) without talking to your doctor first. They could make your heart failure worse. You may take some of the following medicine. Angiotensin-converting enzyme inhibitors (ACEIs) or angiotensin II receptor blockers (ARBs) reduce the heart's workload, lower blood pressure, and reduce swelling. Taking an ACEI or ARB may lower your chance of needing to be hospitalized. Beta-blockers can slow heart rate, decrease blood pressure, and improve your condition. Taking a beta-blocker may lower your chance of needing to be hospitalized. Diuretics, also called water pills, reduce swelling. You will get more details on the specific medicines your doctor prescribes.   Diet    Your doctor may suggest that you limit sodium. Your doctor can tell you how much sodium is right for you. An example is less than 3,000 mg a day. This includes all the salt you eat in cooking or in packaged foods. People get most of their sodium from processed foods. Fast food and restaurant meals also tend to be very high in sodium. Ask your doctor how much liquid you can drink each day. You may have to limit liquids. Weight    Weigh yourself without clothing at the same time each day. Record your weight. Call your doctor if you have a sudden weight gain, such as more than 2 to 3 pounds in a day or 5 pounds in a week. (Your doctor may suggest a different range of weight gain.) A sudden weight gain may mean that your heart failure is getting worse. Activity level    Start light exercise (if your doctor says it is okay). Even if you can only do a small amount, exercise will help you get stronger, have more energy, and manage your weight and your stress. Walking is an easy way to get exercise. Start out by walking a little more than you did before. Bit by bit, increase the amount you walk. When you exercise, watch for signs that your heart is working too hard. You are pushing yourself too hard if you cannot talk while you are exercising. If you become short of breath or dizzy or have chest pain, stop, sit down, and rest.     If you feel \"wiped out\" the day after you exercise, walk slower or for a shorter distance until you can work up to a better pace. Get enough rest at night. Sleeping with 1 or 2 pillows under your upper body and head may help you breathe easier. Lifestyle changes    Do not smoke. Smoking can make a heart condition worse. If you need help quitting, talk to your doctor about stop-smoking programs and medicines. These can increase your chances of quitting for good. Quitting smoking may be the most important step you can take to protect your heart. Limit alcohol to 2 drinks a day for men and 1 drink a day for women. Too much alcohol can cause health problems. Avoid getting sick from colds and the flu. Get a pneumococcal vaccine shot. If you have had one before, ask your doctor whether you need another dose. Get a flu shot each year. If you must be around people with colds or the flu, wash your hands often. When should you call for help? Call 911  if you have symptoms of sudden heart failure such as: You have severe trouble breathing. You cough up pink, foamy mucus. You have a new irregular or rapid heartbeat. Call your doctor now or seek immediate medical care if:    You have new or increased shortness of breath. You are dizzy or lightheaded, or you feel like you may faint. You have sudden weight gain, such as more than 2 to 3 pounds in a day or 5 pounds in a week. (Your doctor may suggest a different range of weight gain.)     You have increased swelling in your legs, ankles, or feet. You are suddenly so tired or weak that you cannot do your usual activities. Watch closely for changes in your health, and be sure to contact your doctor if you develop new symptoms. Where can you learn more? Go to https://Brandsclub.DigiMeld. org and sign in to your [employer] account. Enter Y624 in the Okta box to learn more about \"Heart Failure: Care Instructions. \"     If you do not have an account, please click on the \"Sign Up Now\" link. Current as of: January 10, 2022               Content Version: 13.4  © 2006-2022 [employer]. Care instructions adapted under license by [facility] ([facility]). If you have questions about a medical condition or this instruction, always ask your healthcare professional. [place] [address] any warranty or liability for your use of this information. Please visit www.cardiosmart. org for more information regarding cardiovascular disease prevention and treatment.   1. Weigh daily after arising from bed and emptying your bladder  2. Keep a diary of your daily weights  3. Notify me if your weight increases by 2 lbs in 24 hours or 5 lbs in one week, as this may be an early indicator of increased fluid.

## 2022-10-14 DIAGNOSIS — R06.02 SHORTNESS OF BREATH: ICD-10-CM

## 2022-10-15 LAB
ANION GAP SERPL CALC-SCNC: 9 MMOL/L (ref 2–11)
BUN SERPL-MCNC: 19 MG/DL (ref 8–23)
CALCIUM SERPL-MCNC: 9.6 MG/DL (ref 8.3–10.4)
CHLORIDE SERPL-SCNC: 103 MMOL/L (ref 101–110)
CO2 SERPL-SCNC: 22 MMOL/L (ref 21–32)
CREAT SERPL-MCNC: 1.1 MG/DL (ref 0.6–1)
FERRITIN SERPL-MCNC: 20 NG/ML (ref 8–388)
GLUCOSE SERPL-MCNC: 132 MG/DL (ref 65–100)
IRON SERPL-MCNC: 57 UG/DL (ref 35–150)
POTASSIUM SERPL-SCNC: 5.2 MMOL/L (ref 3.5–5.1)
SODIUM SERPL-SCNC: 134 MMOL/L (ref 133–143)

## 2022-10-17 ENCOUNTER — TELEPHONE (OUTPATIENT)
Dept: CARDIOLOGY CLINIC | Age: 77
End: 2022-10-17

## 2022-10-17 DIAGNOSIS — E87.5 HYPERKALEMIA: Primary | ICD-10-CM

## 2022-10-19 ENCOUNTER — TELEPHONE (OUTPATIENT)
Dept: CARDIOLOGY CLINIC | Age: 77
End: 2022-10-19

## 2022-10-19 NOTE — TELEPHONE ENCOUNTER
Pt called back because she forgot to list two of her medications.     Baclofen - 10 mg - as needed    Oxycodone - 15 mg - 4x a day (every 6 hours)

## 2022-10-20 RX ORDER — BACLOFEN 10 MG/1
10 TABLET ORAL PRN
COMMUNITY

## 2022-10-21 ENCOUNTER — TELEPHONE (OUTPATIENT)
Dept: CARDIOLOGY CLINIC | Age: 77
End: 2022-10-21

## 2022-10-21 RX ORDER — PAROXETINE HYDROCHLORIDE 20 MG/1
20 TABLET, FILM COATED ORAL EVERY MORNING
COMMUNITY

## 2022-10-24 ENCOUNTER — TELEPHONE (OUTPATIENT)
Dept: SLEEP MEDICINE | Age: 77
End: 2022-10-24

## 2022-11-01 ENCOUNTER — TELEPHONE (OUTPATIENT)
Dept: CARDIOLOGY CLINIC | Age: 77
End: 2022-11-01

## 2022-11-01 NOTE — TELEPHONE ENCOUNTER
MEDICATION REFILL REQUEST      Name of Medication:  Furosemide  Dose:  40 mg  Frequency: 1 a day  Quantity:  90  Days' supply:  80      Pharmacy Name/Location: pt did not leave any info on Pharmacy,she also states she needs to know if Dr Albino Otto wants her to keep taking this medication if Dr Albino Otto does she needs a refill

## 2022-11-02 RX ORDER — FUROSEMIDE 40 MG/1
40 TABLET ORAL DAILY
Qty: 90 TABLET | Refills: 3 | Status: SHIPPED | OUTPATIENT
Start: 2022-11-02

## 2022-11-02 NOTE — TELEPHONE ENCOUNTER
Initially sent in on 10/07/2022. Medication, strength, directions and requested pharmacy reviewed/verified.  Prescription pending physician's approval.

## 2022-11-02 NOTE — TELEPHONE ENCOUNTER
MEDICATION REFILL REQUEST      Name of Medication:  Furosemide  Dose:  40 mg  Frequency:  1 tab daily  Quantity:  ?  Days' supply:  ? Pharmacy Name/Location:  [facility] [address]  Phone: [phone]    Pt states her pharmacy never received the refill request and is requesting we send it again.

## 2022-11-02 NOTE — TELEPHONE ENCOUNTER
Patient notified lasix is to be continued, medication was authorized for a year at the last office visit with refills. She will contact pharmacy. Patient also reminded she is due for labs.

## 2022-11-04 RX ORDER — POTASSIUM CHLORIDE 750 MG/1
TABLET, EXTENDED RELEASE ORAL
Qty: 90 TABLET | OUTPATIENT
Start: 2022-11-04

## 2022-12-16 DIAGNOSIS — E87.5 HYPERKALEMIA: ICD-10-CM

## 2022-12-16 LAB
ANION GAP SERPL CALC-SCNC: 7 MMOL/L (ref 2–11)
BUN SERPL-MCNC: 16 MG/DL (ref 8–23)
CALCIUM SERPL-MCNC: 9.1 MG/DL (ref 8.3–10.4)
CHLORIDE SERPL-SCNC: 108 MMOL/L (ref 101–110)
CO2 SERPL-SCNC: 25 MMOL/L (ref 21–32)
CREAT SERPL-MCNC: 0.9 MG/DL (ref 0.6–1)
GLUCOSE SERPL-MCNC: 129 MG/DL (ref 65–100)
POTASSIUM SERPL-SCNC: 4.4 MMOL/L (ref 3.5–5.1)
SODIUM SERPL-SCNC: 140 MMOL/L (ref 133–143)

## 2022-12-23 NOTE — PATIENT INSTRUCTIONS
Patient Education        Aortic Valve Stenosis: Care Instructions  Overview     Having aortic valve stenosis means that the valve between your heart and the large blood vessel that carries blood to the body (aorta) has narrowed. That forces the heart to pump harder to get enough blood through the valve. As stenosis gets worse, the valve gets narrower. This can cause symptoms. Symptoms include chest pain, dizziness, fainting, or shortness of breath. Your doctor will check your heart regularly. You may take medicine to lower blood pressure or cholesterol. If the stenosis gets worse, you may choose to have the valve replaced. Follow-up care is a key part of your treatment and safety. Be sure to make and go to all appointments, and call your doctor if you are having problems. It's also a good idea to know your test results and keep a list of the medicines you take. How can you care for yourself at home? Be safe with medicines. Take your medicines exactly as prescribed. Call your doctor if you think you are having a problem with your medicine. Call your doctor if you have new symptoms or your symptoms get worse. Eat heart-healthy foods. These include vegetables, fruits, nuts, beans, lean meat, fish, and whole grains. Limit sodium, alcohol, and sugar. Be active. Ask your doctor what type and level of exercise is safe for you. Do not smoke. Stay at a healthy weight. Lose weight if you need to. Manage other health problems. If you think you may have a problem with alcohol or drug use, talk to your doctor. Get vaccinated against COVID-23, the flu, and pneumonia. Take care of your teeth and gums. Get regular dental checkups. Good dental health is important because bacteria can spread from infected teeth and gums to the heart valves. When should you call for help? Call 911 anytime you think you may need emergency care. For example, call if:    You passed out (lost consciousness).      You have symptoms of a heart attack. These may include:  Chest pain or pressure, or a strange feeling in the chest.  Sweating. Shortness of breath. Nausea or vomiting. Pain, pressure, or a strange feeling in the back, neck, jaw, or upper belly or in one or both shoulders or arms. Lightheadedness or sudden weakness. A fast or irregular heartbeat. After you call 911, the  may tell you to chew 1 adult-strength or 2 to 4 low-dose aspirin. Wait for an ambulance. Do not try to drive yourself. Call your doctor now or seek immediate medical care if:    You have new symptoms or your symptoms get worse. You have new or increased shortness of breath. You are dizzy or lightheaded, or you feel like you may faint. You have sudden weight gain, such as more than 2 to 3 pounds in a day or 5 pounds in a week. (Your doctor may suggest a different range of weight gain.)     You have new or increased swelling in your legs, ankles, or feet. You are suddenly so tired or weak that you cannot do your usual activities. Watch closely for changes in your health, and be sure to contact your doctor if you have any problems. Where can you learn more? Go to http://www.woods.com/ and enter L898 to learn more about \"Aortic Valve Stenosis: Care Instructions. \"  Current as of: September 7, 2022               Content Version: 13.5  © 2006-2022 [employer], [employer]. Care instructions adapted under license by [facility] ([facility]). If you have questions about a medical condition or this instruction, always ask your healthcare professional. [place] [address] any warranty or liability for your use of this information.

## 2022-12-23 NOTE — PROGRESS NOTES
[facility] CARDIOLOGY  [address] [place] [address], [address] [facility], [address] [place] [address]  PHONE: [phone]      22    NAME:  Karrie Dawson  : 1945  MRN: [id number]         SUBJECTIVE:   Karrie Dawson is a 68 y.o. female seen for a follow up visit regarding the following:     Chief Complaint   Patient presents with    Follow-up     6 weeks    Hypertension              HPI:  Follow up  Follow-up (6 weeks) and Hypertension   . Follow up AS and HFpEF. She has diastolic dysfunction and chronic multifactorial dyspnea. She is feeling pretty well. Notes a 6 month history reduced taste and smell, no lateralizing neuro symptoms and no known covid infection, did get new dentures in that time. Secondly, noting that her left shoulder she's unable to move past midline if she reaches for her night table. She has prior right rotator cuff repair. Denies chest discomfort. Dyspnea is better with 30+ lb intentional weight loss!!          Past cardiac history:   WHO group 3 PHTN d/t MISSY and obesity, on oxygen therapy   - normal coronary arteries at cath    - normal coronary arteries by CTA. Calcium score = 1. Oct 2018 - borderline LVH with EF 60-65%, moderate AS with mean gradient 21, peak 39, peak LVOT 3.1 m/s, normal diastolic parameters and no other significant abnormality   Dec 2019- normal LV size, EF 65-70%, impaired relaxation, mild to mod AS, mean gradient 18, 3 m/s, SVI 42, GUSATBO 1.2. Mild RVE measuring 3.8 with normal function and no TR  LOST TO FOLLOW UP  Sep 2022        Echo - EF 55-60%, abn DF, mod AS, peak velocity 3.2 m/s          Key CAD CHF Meds            furosemide (LASIX) 40 MG tablet (Taking)    Sig - Route:  Take 1 tablet by mouth daily - Oral    amLODIPine (NORVASC) 10 MG tablet (Taking)    Class: Historical Med    losartan (COZAAR) 100 MG tablet (Taking)    Class: Historical Med    lovastatin (MEVACOR) 40 MG tablet (Taking)    Class: Historical Med    nadolol (CORGARD) 80 MG tablet (Taking)    Class: Historical Med          Key Antihyperglycemic Medications            metFORMIN (GLUCOPHAGE) 500 MG tablet (Taking)    Class: Historical Med                Past Medical History, Past Surgical History, Family history, Social History, and Medications were all reviewed with the patient today and updated as necessary. Prior to Admission medications    Medication Sig Start Date End Date Taking?  Authorizing Provider   furosemide (LASIX) 40 MG tablet Take 1 tablet by mouth daily 11/2/22  Yes Reji Grady MD   PARoxetine (PAXIL) 20 MG tablet Take 20 mg by mouth every morning   Yes Historical Provider, MD   baclofen (LIORESAL) 10 MG tablet Take 10 mg by mouth as needed   Yes Historical Provider, MD   oxyCODONE (OXY-IR) 15 MG immediate release tablet  7/13/22  Yes Historical Provider, MD   OXYGEN 2-4 lpm prn   Yes Ar Automatic Reconciliation   amLODIPine (NORVASC) 10 MG tablet Take 10 mg by mouth daily   Yes Ar Automatic Reconciliation   vitamin D3 (CHOLECALCIFEROL) 125 MCG (5000 UT) TABS tablet Take by mouth daily   Yes Ar Automatic Reconciliation   cyanocobalamin 2000 MCG tablet Take 2,000 mcg by mouth daily   Yes Ar Automatic Reconciliation   levothyroxine (SYNTHROID) 50 MCG tablet Take 50 mcg by mouth every morning (before breakfast)   Yes Ar Automatic Reconciliation   losartan (COZAAR) 100 MG tablet Take 100 mg by mouth   Yes Ar Automatic Reconciliation   lovastatin (MEVACOR) 40 MG tablet Take 40 mg by mouth   Yes Ar Automatic Reconciliation   metFORMIN (GLUCOPHAGE) 500 MG tablet Take 1,000 mg by mouth 2 times daily (with meals)   Yes Ar Automatic Reconciliation   nadolol (CORGARD) 80 MG tablet Take 80 mg by mouth   Yes Ar Automatic Reconciliation   omeprazole (PRILOSEC) 20 MG delayed release capsule Take 20 mg by mouth 2 times daily   Yes Ar Automatic Reconciliation   oxybutynin (DITROPAN) 5 MG tablet Take 10 mg by mouth daily   Yes Ar Automatic Reconciliation   zolpidem (AMBIEN) 10 MG tablet Take 10 mg by mouth. Yes Ar Automatic Reconciliation     Allergies   Allergen Reactions    Gabapentin Nausea And Vomiting and Other (See Comments)     Anxiety and GI upset  Anxiety and GI upset       Past Medical History:   Diagnosis Date    Anxiety and depression     since back problems--- well managed with medication    Arthritis     OA hands    Chronic pain     OA    Diabetes mellitus, type 2 (HCC)     oral agents: checks BG 1X weekly avereage 100; A1C 6.5 (7/14/17); denies hypo    GERD (gastroesophageal reflux disease)     omeprazole- denies hiatal hernia- uses 2-3 extra pillows to prevent reflux- states daily reflux up throat     Hypertension     medication controlled    MISSY on CPAP     does not wear CPAP wears continuos oxygen    Oxygen dependent     PONV (postoperative nausea and vomiting)     states IV zofran relieves    Pulmonary nodule     stable, no longer monitored    Thyroid disease     hypo     Past Surgical History:   Procedure Laterality Date    CARPAL TUNNEL RELEASE Right 1988    COLONOSCOPY N/A 7/27/2017    COLONOSCOPY  BMI 41 performed by Derek Pena MD at [facility]    KNEE ARTHROSCOPY  2008    Left knee partial replacement    ORTHOPEDIC SURGERY Bilateral     carpal tunnel    SHOULDER ARTHROSCOPY Right 1989    Rotator cuff     Family History   Problem Relation Age of Onset    Cancer Mother         endometrial    Diabetes Brother     Heart Disease Brother     Heart Disease Brother     Diabetes Brother     Hypertension Brother     Hypertension Brother      Social History     Tobacco Use    Smoking status: Never    Smokeless tobacco: Never   Substance Use Topics    Alcohol use: No       ROS:    Review of Systems   Constitutional: Positive for weight loss (intentional 30+ lb weight loss). Cardiovascular:  Negative for chest pain. Respiratory:  Positive for shortness of breath.          PHYSICAL EXAM:   /62   Pulse 62   Ht 5' 5\" (1.651 m)   Wt 212 lb 4.8 oz (96.3 kg) Comment: with shoes  BMI 35.33 kg/m²      Wt Readings from Last 3 Encounters:   12/27/22 212 lb 4.8 oz (96.3 kg)   10/07/22 240 lb (108.9 kg)   09/26/22 240 lb (108.9 kg)     BP Readings from Last 3 Encounters:   12/27/22 130/62   10/07/22 (!) 110/50   09/26/22 108/60     Pulse Readings from Last 3 Encounters:   12/27/22 62   10/07/22 56   09/26/22 65           Physical Exam  Constitutional:       Appearance: Normal appearance. HENT:      Head: Normocephalic and atraumatic. Eyes:      Conjunctiva/sclera: Conjunctivae normal.   Neck:      Vascular: No carotid bruit. Cardiovascular:      Rate and Rhythm: Normal rate and regular rhythm. Heart sounds: Murmur heard. Harsh midsystolic murmur is present with a grade of 3/6 at the upper right sternal border radiating to the neck. No friction rub. No gallop. Pulmonary:      Effort: No respiratory distress. Breath sounds: No wheezing or rales. Musculoskeletal:         General: Tenderness (anterior left bicep tendon tenderness with reduced ROM) present. No swelling. Cervical back: Neck supple. Skin:     General: Skin is warm and dry. Neurological:      General: No focal deficit present. Mental Status: She is alert. Psychiatric:         Mood and Affect: Mood normal.         Behavior: Behavior normal.       Medical problems and test results were reviewed with the patient today.      DATA REVIEW      BMP  Lab Results   Component Value Date/Time     12/16/2022 02:49 PM    K 4.4 12/16/2022 02:49 PM     12/16/2022 02:49 PM    CO2 25 12/16/2022 02:49 PM    BUN 16 12/16/2022 02:49 PM    CREATININE 0.90 12/16/2022 02:49 PM    GLUCOSE 129 12/16/2022 02:49 PM    CALCIUM 9.1 12/16/2022 02:49 PM          EKG        CXR/IMAGING        DEVICE INTERROGATION        OUTSIDE RECORDS REVIEW    Records from outside providers have been reviewed and summarized as noted in the HPI, past history and data review sections of this note, and reviewed with patient. .       ASSESSMENT and Mya Fink was seen today for follow-up and hypertension. Diagnoses and all orders for this visit:    Rheumatic aortic stenosis  -     Transthoracic echocardiogram (TTE) complete with contrast, bubble, strain, and 3D PRN; Future    Severe obesity ([address]    Pulmonary hypertension (HCC)    Essential hypertension    Dyslipidemia    Type 2 diabetes mellitus with diabetic neuropathy, without long-term current use of insulin ([address]        IMPRESSION:    Great job with weight loss! Dyspnea better    Stable valve disease, continue regular echo surveillance    Shoulder symptoms appear to be orthopedic, she will contact her provider    BP controlled    Lipids at goal              Return in about 6 months (around 6/27/2023) for ECHO BEFORE VISIT. Thank you for allowing me to participate in this patient's care. Please call or contact me if there are any questions or concerns regarding the above.       Cindy Mckinley MD  12/27/22  2:51 PM

## 2022-12-27 ENCOUNTER — OFFICE VISIT (OUTPATIENT)
Dept: CARDIOLOGY CLINIC | Age: 77
End: 2022-12-27
Payer: MEDICARE

## 2022-12-27 VITALS
SYSTOLIC BLOOD PRESSURE: 130 MMHG | HEART RATE: 62 BPM | WEIGHT: 212.3 LBS | HEIGHT: 65 IN | BODY MASS INDEX: 35.37 KG/M2 | DIASTOLIC BLOOD PRESSURE: 62 MMHG

## 2022-12-27 DIAGNOSIS — I10 ESSENTIAL HYPERTENSION: ICD-10-CM

## 2022-12-27 DIAGNOSIS — I27.20 PULMONARY HYPERTENSION (HCC): ICD-10-CM

## 2022-12-27 DIAGNOSIS — I06.0 RHEUMATIC AORTIC STENOSIS: Primary | ICD-10-CM

## 2022-12-27 DIAGNOSIS — E11.40 TYPE 2 DIABETES MELLITUS WITH DIABETIC NEUROPATHY, WITHOUT LONG-TERM CURRENT USE OF INSULIN (HCC): ICD-10-CM

## 2022-12-27 DIAGNOSIS — E66.01 SEVERE OBESITY (HCC): ICD-10-CM

## 2022-12-27 DIAGNOSIS — E78.5 DYSLIPIDEMIA: ICD-10-CM

## 2022-12-27 PROCEDURE — 1036F TOBACCO NON-USER: CPT | Performed by: INTERNAL MEDICINE

## 2022-12-27 PROCEDURE — 3074F SYST BP LT 130 MM HG: CPT | Performed by: INTERNAL MEDICINE

## 2022-12-27 PROCEDURE — G8427 DOCREV CUR MEDS BY ELIG CLIN: HCPCS | Performed by: INTERNAL MEDICINE

## 2022-12-27 PROCEDURE — G8417 CALC BMI ABV UP PARAM F/U: HCPCS | Performed by: INTERNAL MEDICINE

## 2022-12-27 PROCEDURE — 99214 OFFICE O/P EST MOD 30 MIN: CPT | Performed by: INTERNAL MEDICINE

## 2022-12-27 PROCEDURE — 3078F DIAST BP <80 MM HG: CPT | Performed by: INTERNAL MEDICINE

## 2022-12-27 PROCEDURE — 1123F ACP DISCUSS/DSCN MKR DOCD: CPT | Performed by: INTERNAL MEDICINE

## 2022-12-27 PROCEDURE — G8400 PT W/DXA NO RESULTS DOC: HCPCS | Performed by: INTERNAL MEDICINE

## 2022-12-27 PROCEDURE — G8484 FLU IMMUNIZE NO ADMIN: HCPCS | Performed by: INTERNAL MEDICINE

## 2022-12-27 PROCEDURE — 1090F PRES/ABSN URINE INCON ASSESS: CPT | Performed by: INTERNAL MEDICINE

## 2022-12-27 ASSESSMENT — ENCOUNTER SYMPTOMS: SHORTNESS OF BREATH: 1

## 2023-04-17 NOTE — PROGRESS NOTES
Name:  Twyla Sue  YOB: 1945   MRN: [id number]      Office Visit: 4/18/2023        ASSESSMENT AND PLAN:  (Medical Decision Making)    Impression: 66 y.o. female With chronic respiratory failure due to CHF. She previously had elevated RVSP, but this resolved with diuresis and treatment of her hypoxemia. 1. Chronic respiratory failure with hypoxia (HCC)  --Has been noncompliant with her oxygen since last visit. She continues to have desaturations into the 80s with exertion. We will send an updated order to her DME company. She would like to have a portable concentrator. In addition, she needs repeat overnight oximetry on room air. If she continues to desaturate, she will need to resume her nocturnal oxygen. - DME - DURABLE MEDICAL EQUIPMENT  - DME - DURABLE MEDICAL EQUIPMENT    2. Congestive heart failure, unspecified HF chronicity, unspecified heart failure type ([facility] [address] [facility]  --See above. - DME - DURABLE MEDICAL EQUIPMENT    3. Obesity (BMI 30.0-34.9)  --This has improved significantly with lifestyle changes. She is encouraged to continue this. No orders of the defined types were placed in this encounter. Procedures    DME - DURABLE MEDICAL EQUIPMENT     [facility] on room air  Fax results to [phone]    [address]     Scheduling Instructions:      Please supply patient with POC for 2 lpm with exertion. O2 sat room air rest is 94%. O2 sat room air exertion is 87%. O2 sat  2 lpm exertion is 94%. DME--[facility]     Follow-up and Dispositions    Return in about 6 months (around 10/18/2023) for Korey segura MD, Chronic respiratory failure. Collaborating physician is Dr. Luis Max, APRN - CNP    DME=[facility]    Total time for encounter on day of encounter was 34 minutes.   This time includes chart prep, review of tests/procedures, review of other provider's notes, documentation and counseling patient regarding disease process

## 2023-04-18 ENCOUNTER — OFFICE VISIT (OUTPATIENT)
Dept: PULMONOLOGY | Age: 78
End: 2023-04-18
Payer: MEDICARE

## 2023-04-18 VITALS
BODY MASS INDEX: 33.82 KG/M2 | SYSTOLIC BLOOD PRESSURE: 128 MMHG | HEART RATE: 65 BPM | TEMPERATURE: 97.1 F | HEIGHT: 65 IN | OXYGEN SATURATION: 95 % | DIASTOLIC BLOOD PRESSURE: 86 MMHG | RESPIRATION RATE: 18 BRPM | WEIGHT: 203 LBS

## 2023-04-18 DIAGNOSIS — I50.9 CONGESTIVE HEART FAILURE, UNSPECIFIED HF CHRONICITY, UNSPECIFIED HEART FAILURE TYPE (HCC): ICD-10-CM

## 2023-04-18 DIAGNOSIS — E66.9 OBESITY (BMI 30.0-34.9): ICD-10-CM

## 2023-04-18 DIAGNOSIS — J96.11 CHRONIC RESPIRATORY FAILURE WITH HYPOXIA (HCC): Primary | ICD-10-CM

## 2023-04-18 PROCEDURE — G8417 CALC BMI ABV UP PARAM F/U: HCPCS | Performed by: NURSE PRACTITIONER

## 2023-04-18 PROCEDURE — 99213 OFFICE O/P EST LOW 20 MIN: CPT | Performed by: NURSE PRACTITIONER

## 2023-04-18 PROCEDURE — 1090F PRES/ABSN URINE INCON ASSESS: CPT | Performed by: NURSE PRACTITIONER

## 2023-04-18 PROCEDURE — 1123F ACP DISCUSS/DSCN MKR DOCD: CPT | Performed by: NURSE PRACTITIONER

## 2023-04-18 PROCEDURE — 1036F TOBACCO NON-USER: CPT | Performed by: NURSE PRACTITIONER

## 2023-04-18 PROCEDURE — G8400 PT W/DXA NO RESULTS DOC: HCPCS | Performed by: NURSE PRACTITIONER

## 2023-04-18 PROCEDURE — G8427 DOCREV CUR MEDS BY ELIG CLIN: HCPCS | Performed by: NURSE PRACTITIONER

## 2023-06-27 ENCOUNTER — OFFICE VISIT (OUTPATIENT)
Age: 78
End: 2023-06-27
Payer: MEDICARE

## 2023-06-27 VITALS
HEART RATE: 61 BPM | WEIGHT: 202.4 LBS | SYSTOLIC BLOOD PRESSURE: 110 MMHG | BODY MASS INDEX: 33.72 KG/M2 | HEIGHT: 65 IN | DIASTOLIC BLOOD PRESSURE: 56 MMHG

## 2023-06-27 DIAGNOSIS — I06.0 RHEUMATIC AORTIC STENOSIS: Primary | ICD-10-CM

## 2023-06-27 DIAGNOSIS — J96.11 CHRONIC RESPIRATORY FAILURE WITH HYPOXIA (HCC): ICD-10-CM

## 2023-06-27 DIAGNOSIS — I27.20 PULMONARY HYPERTENSION (HCC): ICD-10-CM

## 2023-06-27 PROCEDURE — G8400 PT W/DXA NO RESULTS DOC: HCPCS | Performed by: INTERNAL MEDICINE

## 2023-06-27 PROCEDURE — 93000 ELECTROCARDIOGRAM COMPLETE: CPT | Performed by: INTERNAL MEDICINE

## 2023-06-27 PROCEDURE — 99214 OFFICE O/P EST MOD 30 MIN: CPT | Performed by: INTERNAL MEDICINE

## 2023-06-27 PROCEDURE — 1090F PRES/ABSN URINE INCON ASSESS: CPT | Performed by: INTERNAL MEDICINE

## 2023-06-27 PROCEDURE — 1123F ACP DISCUSS/DSCN MKR DOCD: CPT | Performed by: INTERNAL MEDICINE

## 2023-06-27 PROCEDURE — G8427 DOCREV CUR MEDS BY ELIG CLIN: HCPCS | Performed by: INTERNAL MEDICINE

## 2023-06-27 PROCEDURE — 1036F TOBACCO NON-USER: CPT | Performed by: INTERNAL MEDICINE

## 2023-06-27 PROCEDURE — G8417 CALC BMI ABV UP PARAM F/U: HCPCS | Performed by: INTERNAL MEDICINE

## 2023-06-27 RX ORDER — ATOGEPANT 60 MG/1
TABLET ORAL DAILY
COMMUNITY

## 2023-06-27 ASSESSMENT — ENCOUNTER SYMPTOMS: SHORTNESS OF BREATH: 1

## 2023-10-22 NOTE — PROGRESS NOTES
Name:  Sarah Beth Gallego  YOB: 1945   MRN: [id number]      Office Visit: 10/23/2023        ASSESSMENT AND PLAN:  (Medical Decision Making)    Impression: 66 y.o. female with chronic respiratory failure due to CHF. Previously had elevated RVSP, but this resolved after diuresis and treatment of her hypoxemia. Recent echo revealed elevation in RVSP back to 61 mmHg. 1. Pulmonary hypertension ([address])  --She has been noncompliant with her oxygen and her Lasix. I would like for her to resume her Lasix 40 mg/day. Last BMP was in July which was acceptable. In addition, we will check overnight oximetry on room air to see what her oxygen needs are.  - DME - DURABLE MEDICAL EQUIPMENT    2. Hypoxemia  --See above. - DME - DURABLE MEDICAL EQUIPMENT    3. Chest pain, unspecified type  --She is having some intermittent exertional chest pain. She sees cardiology tomorrow and I will let them address this. 4. Dyspnea on exertion  --This has worsened. This is likely related to #1. Orders Placed This Encounter   Medications    furosemide (LASIX) 40 MG tablet     Sig: Take 1 tablet by mouth daily     Dispense:  90 tablet     Refill:  3         Procedures    DME - DURABLE MEDICAL EQUIPMENT     DME--[facility] on room air for qualification     Follow-up and Dispositions    Return in about 8 weeks (around 12/18/2023) for Kathryn--pHTN--40 minutes. Collaborating physician is Dr. Chani Nichols. ALAN Kumar, APRN - CNP    DME=[facility]      _________________________________________________________________________    HISTORY OF PRESENT ILLNESS:    Ms. Kelvin Quintero is a 66 y.o. female who is seen at [facility] today for  Follow-up     The patient is a 78-year-old female who is seen for follow-up of shortness of breath.   She has a history of obesity, obstructive sleep apnea, nocturnal hypoxemia, and previous pulmonary hypertension felt to be secondary to chronic hypoxemia and volume overload she

## 2023-10-23 ENCOUNTER — OFFICE VISIT (OUTPATIENT)
Dept: PULMONOLOGY | Age: 78
End: 2023-10-23
Payer: COMMERCIAL

## 2023-10-23 VITALS
HEIGHT: 65 IN | BODY MASS INDEX: 33.32 KG/M2 | SYSTOLIC BLOOD PRESSURE: 124 MMHG | RESPIRATION RATE: 17 BRPM | OXYGEN SATURATION: 92 % | HEART RATE: 66 BPM | DIASTOLIC BLOOD PRESSURE: 78 MMHG | WEIGHT: 200 LBS

## 2023-10-23 DIAGNOSIS — R07.9 CHEST PAIN, UNSPECIFIED TYPE: ICD-10-CM

## 2023-10-23 DIAGNOSIS — R06.09 DYSPNEA ON EXERTION: ICD-10-CM

## 2023-10-23 DIAGNOSIS — R09.02 HYPOXEMIA: ICD-10-CM

## 2023-10-23 DIAGNOSIS — I27.20 PULMONARY HYPERTENSION (HCC): Primary | ICD-10-CM

## 2023-10-23 PROCEDURE — 1123F ACP DISCUSS/DSCN MKR DOCD: CPT | Performed by: NURSE PRACTITIONER

## 2023-10-23 PROCEDURE — 99214 OFFICE O/P EST MOD 30 MIN: CPT | Performed by: NURSE PRACTITIONER

## 2023-10-23 RX ORDER — BUPROPION HYDROCHLORIDE 150 MG/1
150 TABLET ORAL DAILY
COMMUNITY
Start: 2023-10-18

## 2023-10-23 RX ORDER — FUROSEMIDE 40 MG/1
40 TABLET ORAL DAILY
Qty: 90 TABLET | Refills: 3 | Status: SHIPPED | OUTPATIENT
Start: 2023-10-23

## 2023-10-23 NOTE — PATIENT INSTRUCTIONS
Patient Education        Aortic Valve Stenosis: Care Instructions  Overview     Having aortic valve stenosis means that the valve between your heart and the large blood vessel that carries blood to the body (aorta) has narrowed. That forces the heart to pump harder to get enough blood through the valve. As stenosis gets worse, the valve gets narrower. This can cause symptoms. Symptoms include chest pain, dizziness, fainting, or shortness of breath. Your doctor will check your heart regularly. You may take medicine to lower blood pressure or cholesterol. If the stenosis gets worse, you may choose to have the valve replaced. Follow-up care is a key part of your treatment and safety. Be sure to make and go to all appointments, and call your doctor if you are having problems. It's also a good idea to know your test results and keep a list of the medicines you take. How can you care for yourself at home? Be safe with medicines. Take your medicines exactly as prescribed. Call your doctor if you think you are having a problem with your medicine. Call your doctor if you have new symptoms or your symptoms get worse. Eat heart-healthy foods. These include vegetables, fruits, nuts, beans, lean meat, fish, and whole grains. Limit sodium, alcohol, and sugar. Be active. Ask your doctor what type and level of exercise is safe for you. Do not smoke. Stay at a healthy weight. Lose weight if you need to. Manage other health problems. If you think you may have a problem with alcohol or drug use, talk to your doctor. Get vaccinated against [id number], the flu, and pneumonia. Take care of your teeth and gums. Get regular dental checkups. Good dental health is important because bacteria can spread from infected teeth and gums to the heart valves. When should you call for help? Call 911 anytime you think you may need emergency care. For example, call if:    You passed out (lost consciousness).      You have symptoms of a heart

## 2023-10-23 NOTE — PROGRESS NOTES
[facility] CARDIOLOGY  [address] [facility], [facility], [address]  PHONE: [phone]      10/24/23    NAME:  Glo Su  : 1945  MRN: [id number]         SUBJECTIVE:   Glo Su is a 66 y.o. female seen for a follow up visit regarding the following:     Chief Complaint   Patient presents with    AORTIC STENOSIS      ECHO             HPI:  Follow up  AORTIC STENOSIS  (ECHO )   . Follow up AS and HFpEF. She has diastolic dysfunction and chronic multifactorial dyspnea with chronic respiratory failure on supplemental oxygen, pulmonary hypertension. Echo stable mod AS. She has chronic pain, in pain management. She has left arm numbness and pain in her shoulder blade. Her pain doctor has suggested she probably has cervical disc disease. She has her chronic dyspnea, has had no peripheral edema. 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 Calcium score = 1. Oct 2018 - borderline LVH with EF 60-65%, moderate AS with mean gradient 21, peak 39, peak LVOT 3.1 m/s, normal diastolic parameters and no other significant abnormality   Dec 2019- normal LV size, EF 65-70%, impaired relaxation, mild to mod AS, mean gradient 18, 3 m/s, SVI 42, GUSTABO 1.2. Mild RVE measuring 3.8 with normal function and no TR  LOST TO FOLLOW UP  Sep 2022        Echo - EF 55-60%, abn DF, mod AS, peak velocity 3.2 m/s  Oct 2023       EF 60-65%, mild LVH, ind DF, mod AS, mean 22, peak velocity 3.4 m/s, RVSP 61            Key CAD CHF Meds            furosemide (LASIX) 40 MG tablet (Taking)    Sig - Route:  Take 1 tablet by mouth daily - Oral    amLODIPine (NORVASC) 10 MG tablet (Taking)    Class: Historical Med    losartan (COZAAR) 100 MG tablet (Taking)    Class: Historical Med    lovastatin (MEVACOR) 40 MG tablet (Taking)    Class: Historical Med    nadolol (CORGARD) 80 MG

## 2023-10-24 ENCOUNTER — OFFICE VISIT (OUTPATIENT)
Age: 78
End: 2023-10-24
Payer: COMMERCIAL

## 2023-10-24 VITALS
HEART RATE: 68 BPM | BODY MASS INDEX: 33.32 KG/M2 | WEIGHT: 200 LBS | DIASTOLIC BLOOD PRESSURE: 74 MMHG | HEIGHT: 65 IN | SYSTOLIC BLOOD PRESSURE: 124 MMHG

## 2023-10-24 DIAGNOSIS — E11.42 TYPE 2 DIABETES MELLITUS WITH DIABETIC POLYNEUROPATHY, WITHOUT LONG-TERM CURRENT USE OF INSULIN (HCC): ICD-10-CM

## 2023-10-24 DIAGNOSIS — I06.0 RHEUMATIC AORTIC STENOSIS: Primary | ICD-10-CM

## 2023-10-24 DIAGNOSIS — J96.11 CHRONIC RESPIRATORY FAILURE WITH HYPOXIA (HCC): ICD-10-CM

## 2023-10-24 DIAGNOSIS — I27.20 PULMONARY HYPERTENSION (HCC): ICD-10-CM

## 2023-10-24 PROCEDURE — 99214 OFFICE O/P EST MOD 30 MIN: CPT | Performed by: INTERNAL MEDICINE

## 2023-10-24 PROCEDURE — 1123F ACP DISCUSS/DSCN MKR DOCD: CPT | Performed by: INTERNAL MEDICINE

## 2023-10-24 ASSESSMENT — ENCOUNTER SYMPTOMS: SHORTNESS OF BREATH: 1

## 2024-01-03 NOTE — PROGRESS NOTES
Name:  Mila West  YOB: 1945   MRN: [id number]      Office Visit: 1/4/2024        ASSESSMENT AND PLAN:  (Medical Decision Making)    Impression: 78 y.o. female with chronic respiratory failure due to CHF.  Previously had elevated RVSP, but this resolved after diuresis and treatment of her hypoxemia.  Recent echo revealed elevation in RVSP back to 61 mmHg, but she had been noncompliant with these things.    1. Pulmonary hypertension (HCC)  --previous pressures normalized with diuresis and correction of hypoxemia.  She is going to need an echo once she is euvolemic.  I have stressed the importance of taking her Lasix daily and wearing her O2 at 2 lpm with exertion and 4 lpm with sleep  - DME - DURABLE MEDICAL EQUIPMENT    2. Hypoxemia  --VISHNU on room air shows > 6 hours of desaturation as low as 74%.  She needs to continue her O2 at 4 lpm with sleep.  Will arrange for POC at 2 lpm with exertion (2nd request).  If insurance denies it, will need to send order to [employer] to work out a payment plan.  - DME - DURABLE MEDICAL EQUIPMENT    3. Congestive heart failure, unspecified HF chronicity, unspecified --sees Dr. Tuan ortez    4. MISSY (obstructive sleep apnea)  --intolerant of CPAP--does not wish to pursue it again.    No orders of the defined types were placed in this encounter.        Procedures    DME - DURABLE MEDICAL EQUIPMENT     Please provide patient with POC for 2 lpm O2.  O2 sat room air rest is 92%.  O2 sat room air exertion is 87%.  O2 sat 2 lpm exertion is 94%.  Second request     Follow-up and Dispositions    Return in about 4 months (around 5/4/2024) for Sarah--[facility], Arrive 20 minutes prior to appt time.     Collaborating physician is Dr. Maria E Simon.    ADS    Kathryn Dejesus, APRN - CNP    DME=Rama Smith/Anjelica=pulmonary team    Total time for encounter on day of encounter was 43 minutes.  This time includes chart prep, review of tests/procedures, review of other provider's

## 2024-01-04 ENCOUNTER — OFFICE VISIT (OUTPATIENT)
Dept: PULMONOLOGY | Age: 79
End: 2024-01-04
Payer: MEDICARE

## 2024-01-04 VITALS
OXYGEN SATURATION: 93 % | HEIGHT: 65 IN | SYSTOLIC BLOOD PRESSURE: 124 MMHG | BODY MASS INDEX: 33.95 KG/M2 | WEIGHT: 203.8 LBS | TEMPERATURE: 97.2 F | RESPIRATION RATE: 18 BRPM | DIASTOLIC BLOOD PRESSURE: 78 MMHG | HEART RATE: 64 BPM

## 2024-01-04 DIAGNOSIS — G47.33 OSA (OBSTRUCTIVE SLEEP APNEA): ICD-10-CM

## 2024-01-04 DIAGNOSIS — R09.02 HYPOXEMIA: ICD-10-CM

## 2024-01-04 DIAGNOSIS — I27.20 PULMONARY HYPERTENSION (HCC): Primary | ICD-10-CM

## 2024-01-04 DIAGNOSIS — I50.9 CONGESTIVE HEART FAILURE, UNSPECIFIED HF CHRONICITY, UNSPECIFIED HEART FAILURE TYPE (HCC): ICD-10-CM

## 2024-01-04 PROCEDURE — 1123F ACP DISCUSS/DSCN MKR DOCD: CPT | Performed by: NURSE PRACTITIONER

## 2024-01-04 PROCEDURE — 1036F TOBACCO NON-USER: CPT | Performed by: NURSE PRACTITIONER

## 2024-01-04 PROCEDURE — 1090F PRES/ABSN URINE INCON ASSESS: CPT | Performed by: NURSE PRACTITIONER

## 2024-01-04 PROCEDURE — 99215 OFFICE O/P EST HI 40 MIN: CPT | Performed by: NURSE PRACTITIONER

## 2024-01-04 PROCEDURE — G8417 CALC BMI ABV UP PARAM F/U: HCPCS | Performed by: NURSE PRACTITIONER

## 2024-01-04 PROCEDURE — G8399 PT W/DXA RESULTS DOCUMENT: HCPCS | Performed by: NURSE PRACTITIONER

## 2024-01-04 PROCEDURE — G8484 FLU IMMUNIZE NO ADMIN: HCPCS | Performed by: NURSE PRACTITIONER

## 2024-01-04 PROCEDURE — G8427 DOCREV CUR MEDS BY ELIG CLIN: HCPCS | Performed by: NURSE PRACTITIONER

## 2024-01-04 RX ORDER — SUCRALFATE 1 G/1
1 TABLET ORAL
COMMUNITY
Start: 2023-07-13 | End: 2024-07-12

## 2025-04-04 NOTE — TELEPHONE ENCOUNTER
----- Message from Linda Palma MD sent at 10/17/2022  7:25 AM EDT -----  Please have patient read off her home meds and update list, high degree of confusion last visit. Make sure she is NOT taking supplemental potassium. If she IS, please STOP. If she ISN\"T, stop any vitamins that contain potassium, stop any NSAID's and fruit juices, repeat BMP in 2 weeks.  thx
I spoke with the patient. Medication list matches the bottle she read from home. Only supplements she is taking are B12 and D3. Also, not drinking any kind of fruit juices.    Orders Placed This Encounter   Procedures    Basic Metabolic Panel     Standing Status:   Future     Standing Expiration Date:   10/19/2023
Pt called back.
General

## (undated) DEVICE — SUTURE VCRL SZ 2-0 L27IN ABSRB UD L36MM CP-1 1/2 CIR REV J266H

## (undated) DEVICE — (D)PREP SKN CHLRAPRP APPL 26ML -- CONVERT TO ITEM 371833

## (undated) DEVICE — CONNECTOR TBNG OD5-7MM O2 END DISP

## (undated) DEVICE — DRAPE XR C ARM 41X74IN LF --

## (undated) DEVICE — REM POLYHESIVE ADULT PATIENT RETURN ELECTRODE: Brand: VALLEYLAB

## (undated) DEVICE — UNIVERSAL DRAPES: Brand: MEDLINE INDUSTRIES, INC.

## (undated) DEVICE — PACK PROCEDURE SURG POST LAMINECTOMY CDS

## (undated) DEVICE — LAMP REPL STRP GEO MATT

## (undated) DEVICE — KENDALL RADIOLUCENT FOAM MONITORING ELECTRODE RECTANGULAR SHAPE: Brand: KENDALL

## (undated) DEVICE — FLOSEAL HEMOSTATIC MATRIX, 5 ML: Brand: FLOSEAL

## (undated) DEVICE — 1010 S-DRAPE TOWEL DRAPE 10/BX: Brand: STERI-DRAPE™

## (undated) DEVICE — KENDALL SCD EXPRESS SLEEVES, KNEE LENGTH, MEDIUM: Brand: KENDALL SCD

## (undated) DEVICE — SOLUTION IV 1000ML 0.9% SOD CHL

## (undated) DEVICE — CANNULA NSL ORAL AD FOR CAPNOFLEX CO2 O2 AIRLFE

## (undated) DEVICE — COVER US PRB W12XL244CM FLD IORT STR TIP

## (undated) DEVICE — BLOCK BITE AD 60FR W/ VELC STRP ADDRESSES MOST PT AND